# Patient Record
Sex: FEMALE | Race: WHITE | NOT HISPANIC OR LATINO | Employment: UNEMPLOYED | URBAN - METROPOLITAN AREA
[De-identification: names, ages, dates, MRNs, and addresses within clinical notes are randomized per-mention and may not be internally consistent; named-entity substitution may affect disease eponyms.]

---

## 2017-03-15 ENCOUNTER — HISTORICAL (OUTPATIENT)
Dept: RADIOLOGY | Facility: HOSPITAL | Age: 42
End: 2017-03-15

## 2017-06-27 ENCOUNTER — HISTORICAL (OUTPATIENT)
Dept: RADIOLOGY | Facility: HOSPITAL | Age: 42
End: 2017-06-27

## 2017-06-27 LAB
ABS NEUT (OLG): 5.6 X10(3)/MCL (ref 1.5–6.9)
ALBUMIN SERPL-MCNC: 3.4 GM/DL (ref 3.4–5)
ALBUMIN/GLOB SERPL: 0.9 RATIO
ALP SERPL-CCNC: 81 UNIT/L (ref 30–113)
ALT SERPL-CCNC: 22 UNIT/L (ref 10–45)
APPEARANCE, UA: CLEAR
AST SERPL-CCNC: 13 UNIT/L (ref 15–37)
BACTERIA SPEC CULT: ABNORMAL /HPF
BASOPHILS # BLD AUTO: 0 X10(3)/MCL (ref 0–0.1)
BASOPHILS NFR BLD AUTO: 0 % (ref 0–1)
BILIRUB SERPL-MCNC: 0.7 MG/DL (ref 0.1–0.9)
BILIRUB UR QL STRIP: NEGATIVE
BILIRUBIN DIRECT+TOT PNL SERPL-MCNC: 0.2 MG/DL (ref 0–0.3)
BILIRUBIN DIRECT+TOT PNL SERPL-MCNC: 0.5 MG/DL
BUN SERPL-MCNC: 10 MG/DL (ref 10–20)
CALCIUM SERPL-MCNC: 9.2 MG/DL (ref 8–10.5)
CHLORIDE SERPL-SCNC: 106 MMOL/L (ref 100–108)
CHOLEST SERPL-MCNC: 171 MG/DL (ref 140–200)
CHOLEST/HDLC SERPL: 3 MG/DL (ref 35–59)
CO2 SERPL-SCNC: 27 MMOL/L (ref 21–35)
COLOR UR: ABNORMAL
CREAT SERPL-MCNC: 0.87 MG/DL (ref 0.7–1.3)
DEPRECATED CALCIDIOL+CALCIFEROL SERPL-MC: 23.31 NG/ML (ref 30–80)
EOSINOPHIL # BLD AUTO: 0.1 X10(3)/MCL (ref 0–0.6)
EOSINOPHIL NFR BLD AUTO: 2 % (ref 0–5)
ERYTHROCYTE [DISTWIDTH] IN BLOOD BY AUTOMATED COUNT: 14.5 % (ref 11.5–17)
GLOBULIN SER-MCNC: 3.9 GM/DL
GLUCOSE (UA): NEGATIVE
GLUCOSE SERPL-MCNC: 97 MG/DL (ref 75–116)
HCT VFR BLD AUTO: 38.4 % (ref 36–48)
HDLC SERPL-MCNC: 52 MG/DL (ref 35–59)
HGB BLD-MCNC: 12.2 GM/DL (ref 12–16)
HGB UR QL STRIP: ABNORMAL
KETONES UR QL STRIP: NEGATIVE
LDLC SERPL CALC-MCNC: 113 MG/DL (ref 100–129)
LEUKOCYTE ESTERASE UR QL STRIP: NEGATIVE
LYMPHOCYTES # BLD AUTO: 1.7 X10(3)/MCL (ref 0.5–4.1)
LYMPHOCYTES NFR BLD AUTO: 21.1 % (ref 15–40)
MCH RBC QN AUTO: 26 PG (ref 27–34)
MCHC RBC AUTO-ENTMCNC: 32 GM/DL (ref 31–36)
MCV RBC AUTO: 83 FL (ref 80–99)
MONOCYTES # BLD AUTO: 0.6 X10(3)/MCL (ref 0–1.1)
MONOCYTES NFR BLD AUTO: 8 % (ref 4–12)
NEUTROPHILS # BLD AUTO: 5.6 X10(3)/MCL (ref 1.5–6.9)
NEUTROPHILS NFR BLD AUTO: 69 % (ref 43–75)
NITRITE UR QL STRIP: NEGATIVE
PH UR STRIP: 6 [PH]
PLATELET # BLD AUTO: 273 X10(3)/MCL (ref 140–400)
PMV BLD AUTO: 10.9 FL (ref 6.8–10)
POTASSIUM SERPL-SCNC: 3.9 MMOL/L (ref 3.6–5.2)
PROT SERPL-MCNC: 7.3 GM/DL (ref 6.4–8.2)
PROT UR QL STRIP: NEGATIVE
RBC # BLD AUTO: 4.64 X10(6)/MCL (ref 4.2–5.4)
RBC #/AREA URNS HPF: ABNORMAL /HPF
SODIUM SERPL-SCNC: 141 MMOL/L (ref 135–145)
SP GR UR STRIP: 1.01
SQUAMOUS EPITHELIAL, UA: ABNORMAL /LPF
TRIGL SERPL-MCNC: 73 MG/DL (ref 35–150)
TSH SERPL-ACNC: 1.37 MIU/ML (ref 0.36–3.74)
UROBILINOGEN UR STRIP-ACNC: 0.2 EU/DL
VLDLC SERPL CALC-MCNC: 15 MG/DL
WBC # SPEC AUTO: 8.1 X10(3)/MCL (ref 4.5–11.5)
WBC #/AREA URNS HPF: ABNORMAL /[HPF]

## 2018-04-05 ENCOUNTER — HISTORICAL (OUTPATIENT)
Dept: RADIOLOGY | Facility: HOSPITAL | Age: 43
End: 2018-04-05

## 2018-07-03 ENCOUNTER — HISTORICAL (OUTPATIENT)
Dept: LAB | Facility: HOSPITAL | Age: 43
End: 2018-07-03

## 2018-07-05 LAB — FINAL CULTURE: NORMAL

## 2019-05-24 ENCOUNTER — HISTORICAL (OUTPATIENT)
Dept: RADIOLOGY | Facility: HOSPITAL | Age: 44
End: 2019-05-24

## 2020-08-10 ENCOUNTER — HISTORICAL (OUTPATIENT)
Dept: RADIOLOGY | Facility: HOSPITAL | Age: 45
End: 2020-08-10

## 2021-09-13 ENCOUNTER — HISTORICAL (OUTPATIENT)
Dept: RADIOLOGY | Facility: HOSPITAL | Age: 46
End: 2021-09-13

## 2022-09-19 ENCOUNTER — HOSPITAL ENCOUNTER (OUTPATIENT)
Dept: RADIOLOGY | Facility: HOSPITAL | Age: 47
Discharge: HOME | End: 2022-09-19
Attending: OBSTETRICS & GYNECOLOGY
Payer: COMMERCIAL

## 2022-09-19 DIAGNOSIS — Z12.31 ENCOUNTER FOR SCREENING MAMMOGRAM FOR BREAST CANCER: ICD-10-CM

## 2022-09-19 PROCEDURE — 77067 SCR MAMMO BI INCL CAD: CPT | Mod: 26,,, | Performed by: STUDENT IN AN ORGANIZED HEALTH CARE EDUCATION/TRAINING PROGRAM

## 2022-09-19 PROCEDURE — 77063 BREAST TOMOSYNTHESIS BI: CPT | Mod: 26,,, | Performed by: STUDENT IN AN ORGANIZED HEALTH CARE EDUCATION/TRAINING PROGRAM

## 2022-09-19 PROCEDURE — 77063 MAMMO DIGITAL SCREENING BILAT WITH TOMO: ICD-10-PCS | Mod: 26,,, | Performed by: STUDENT IN AN ORGANIZED HEALTH CARE EDUCATION/TRAINING PROGRAM

## 2022-09-19 PROCEDURE — 77067 MAMMO DIGITAL SCREENING BILAT WITH TOMO: ICD-10-PCS | Mod: 26,,, | Performed by: STUDENT IN AN ORGANIZED HEALTH CARE EDUCATION/TRAINING PROGRAM

## 2022-09-19 PROCEDURE — 77067 SCR MAMMO BI INCL CAD: CPT | Mod: TC

## 2023-09-15 ENCOUNTER — ANESTHESIA EVENT (OUTPATIENT)
Dept: SURGERY | Facility: HOSPITAL | Age: 48
DRG: 493 | End: 2023-09-15
Payer: MEDICAID

## 2023-09-15 ENCOUNTER — ANESTHESIA (OUTPATIENT)
Dept: SURGERY | Facility: HOSPITAL | Age: 48
DRG: 493 | End: 2023-09-15
Payer: MEDICAID

## 2023-09-15 ENCOUNTER — HOSPITAL ENCOUNTER (INPATIENT)
Facility: HOSPITAL | Age: 48
LOS: 13 days | Discharge: HOME HEALTH | DRG: 493 | End: 2023-09-28
Attending: STUDENT IN AN ORGANIZED HEALTH CARE EDUCATION/TRAINING PROGRAM | Admitting: ORTHOPAEDIC SURGERY
Payer: MEDICAID

## 2023-09-15 DIAGNOSIS — S82.892A: ICD-10-CM

## 2023-09-15 DIAGNOSIS — S82.872A CLOSED DISPLACED PILON FRACTURE OF LEFT TIBIA, INITIAL ENCOUNTER: ICD-10-CM

## 2023-09-15 DIAGNOSIS — S82.871A CLOSED DISPLACED PILON FRACTURE OF RIGHT TIBIA, INITIAL ENCOUNTER: Primary | ICD-10-CM

## 2023-09-15 DIAGNOSIS — M79.603 ARM PAIN: ICD-10-CM

## 2023-09-15 DIAGNOSIS — T14.90XA TRAUMA: ICD-10-CM

## 2023-09-15 DIAGNOSIS — S82.891A: ICD-10-CM

## 2023-09-15 DIAGNOSIS — M25.579 ANKLE PAIN: ICD-10-CM

## 2023-09-15 LAB
ABORH RETYPE: NORMAL
ALBUMIN SERPL-MCNC: 4.3 G/DL (ref 3.5–5)
ALBUMIN/GLOB SERPL: 1.3 RATIO (ref 1.1–2)
ALP SERPL-CCNC: 98 UNIT/L (ref 40–150)
ALT SERPL-CCNC: 16 UNIT/L (ref 0–55)
APTT PPP: 23.4 SECONDS (ref 23.2–33.7)
AST SERPL-CCNC: 16 UNIT/L (ref 5–34)
BASOPHILS # BLD AUTO: 0.04 X10(3)/MCL
BASOPHILS NFR BLD AUTO: 0.3 %
BILIRUB SERPL-MCNC: 0.9 MG/DL
BUN SERPL-MCNC: 11.6 MG/DL (ref 7–18.7)
CALCIUM SERPL-MCNC: 9.7 MG/DL (ref 8.4–10.2)
CHLORIDE SERPL-SCNC: 106 MMOL/L (ref 98–107)
CO2 SERPL-SCNC: 22 MMOL/L (ref 22–29)
CREAT SERPL-MCNC: 0.94 MG/DL (ref 0.55–1.02)
EOSINOPHIL # BLD AUTO: 0.24 X10(3)/MCL (ref 0–0.9)
EOSINOPHIL NFR BLD AUTO: 2 %
ERYTHROCYTE [DISTWIDTH] IN BLOOD BY AUTOMATED COUNT: 13.7 % (ref 11.5–17)
ETHANOL SERPL-MCNC: <10 MG/DL
GFR SERPLBLD CREATININE-BSD FMLA CKD-EPI: >60 MLS/MIN/1.73/M2
GLOBULIN SER-MCNC: 3.4 GM/DL (ref 2.4–3.5)
GLUCOSE SERPL-MCNC: 127 MG/DL (ref 74–100)
GROUP & RH: NORMAL
HCT VFR BLD AUTO: 40.2 % (ref 37–47)
HGB BLD-MCNC: 13.3 G/DL (ref 12–16)
IMM GRANULOCYTES # BLD AUTO: 0.06 X10(3)/MCL (ref 0–0.04)
IMM GRANULOCYTES NFR BLD AUTO: 0.5 %
INDIRECT COOMBS GEL: NORMAL
INR PPP: 1
LACTATE SERPL-SCNC: 0.9 MMOL/L (ref 0.5–2.2)
LACTATE SERPL-SCNC: 2.5 MMOL/L (ref 0.5–2.2)
LYMPHOCYTES # BLD AUTO: 2.13 X10(3)/MCL (ref 0.6–4.6)
LYMPHOCYTES NFR BLD AUTO: 17.7 %
MCH RBC QN AUTO: 28.2 PG (ref 27–31)
MCHC RBC AUTO-ENTMCNC: 33.1 G/DL (ref 33–36)
MCV RBC AUTO: 85.2 FL (ref 80–94)
MONOCYTES # BLD AUTO: 0.77 X10(3)/MCL (ref 0.1–1.3)
MONOCYTES NFR BLD AUTO: 6.4 %
NEUTROPHILS # BLD AUTO: 8.81 X10(3)/MCL (ref 2.1–9.2)
NEUTROPHILS NFR BLD AUTO: 73.1 %
NRBC BLD AUTO-RTO: 0 %
PLATELET # BLD AUTO: 275 X10(3)/MCL (ref 130–400)
PMV BLD AUTO: 10 FL (ref 7.4–10.4)
POTASSIUM SERPL-SCNC: 3.7 MMOL/L (ref 3.5–5.1)
PROT SERPL-MCNC: 7.7 GM/DL (ref 6.4–8.3)
PROTHROMBIN TIME: 12.8 SECONDS (ref 12.5–14.5)
RBC # BLD AUTO: 4.72 X10(6)/MCL (ref 4.2–5.4)
SODIUM SERPL-SCNC: 139 MMOL/L (ref 136–145)
SPECIMEN OUTDATE: NORMAL
WBC # SPEC AUTO: 12.05 X10(3)/MCL (ref 4.5–11.5)

## 2023-09-15 PROCEDURE — G0390 TRAUMA RESPONS W/HOSP CRITI: HCPCS

## 2023-09-15 PROCEDURE — C1713 ANCHOR/SCREW BN/BN,TIS/BN: HCPCS | Performed by: ORTHOPAEDIC SURGERY

## 2023-09-15 PROCEDURE — 71000033 HC RECOVERY, INTIAL HOUR: Performed by: ORTHOPAEDIC SURGERY

## 2023-09-15 PROCEDURE — 25500020 PHARM REV CODE 255: Performed by: STUDENT IN AN ORGANIZED HEALTH CARE EDUCATION/TRAINING PROGRAM

## 2023-09-15 PROCEDURE — 63600175 PHARM REV CODE 636 W HCPCS: Performed by: NURSE ANESTHETIST, CERTIFIED REGISTERED

## 2023-09-15 PROCEDURE — 82077 ASSAY SPEC XCP UR&BREATH IA: CPT | Performed by: STUDENT IN AN ORGANIZED HEALTH CARE EDUCATION/TRAINING PROGRAM

## 2023-09-15 PROCEDURE — 20690 APPL UNIPLN UNI EXT FIXJ SYS: CPT | Mod: 51,LT,, | Performed by: ORTHOPAEDIC SURGERY

## 2023-09-15 PROCEDURE — D9220A PRA ANESTHESIA: ICD-10-PCS | Mod: CRNA,,, | Performed by: NURSE ANESTHETIST, CERTIFIED REGISTERED

## 2023-09-15 PROCEDURE — 96361 HYDRATE IV INFUSION ADD-ON: CPT

## 2023-09-15 PROCEDURE — 63600175 PHARM REV CODE 636 W HCPCS

## 2023-09-15 PROCEDURE — 21400001 HC TELEMETRY ROOM

## 2023-09-15 PROCEDURE — 36000709 HC OR TIME LEV III EA ADD 15 MIN: Performed by: ORTHOPAEDIC SURGERY

## 2023-09-15 PROCEDURE — 99291 CRITICAL CARE FIRST HOUR: CPT

## 2023-09-15 PROCEDURE — 37000009 HC ANESTHESIA EA ADD 15 MINS: Performed by: ORTHOPAEDIC SURGERY

## 2023-09-15 PROCEDURE — 85730 THROMBOPLASTIN TIME PARTIAL: CPT | Performed by: STUDENT IN AN ORGANIZED HEALTH CARE EDUCATION/TRAINING PROGRAM

## 2023-09-15 PROCEDURE — 36000708 HC OR TIME LEV III 1ST 15 MIN: Performed by: ORTHOPAEDIC SURGERY

## 2023-09-15 PROCEDURE — 25000003 PHARM REV CODE 250

## 2023-09-15 PROCEDURE — 27825 PR CLOSED RX WEIGHT BEAR DIST TIB,MANIP: ICD-10-PCS | Mod: 51,50,, | Performed by: ORTHOPAEDIC SURGERY

## 2023-09-15 PROCEDURE — 85025 COMPLETE CBC W/AUTO DIFF WBC: CPT | Performed by: STUDENT IN AN ORGANIZED HEALTH CARE EDUCATION/TRAINING PROGRAM

## 2023-09-15 PROCEDURE — 20690 PR APPLY BONE UNIPLANE,EXT FIX DEV: ICD-10-PCS | Mod: RT,,, | Performed by: ORTHOPAEDIC SURGERY

## 2023-09-15 PROCEDURE — 25000003 PHARM REV CODE 250: Performed by: NURSE ANESTHETIST, CERTIFIED REGISTERED

## 2023-09-15 PROCEDURE — 37000008 HC ANESTHESIA 1ST 15 MINUTES: Performed by: ORTHOPAEDIC SURGERY

## 2023-09-15 PROCEDURE — D9220A PRA ANESTHESIA: Mod: ANES,,, | Performed by: ANESTHESIOLOGY

## 2023-09-15 PROCEDURE — D9220A PRA ANESTHESIA: ICD-10-PCS | Mod: ANES,,, | Performed by: ANESTHESIOLOGY

## 2023-09-15 PROCEDURE — D9220A PRA ANESTHESIA: Mod: CRNA,,, | Performed by: NURSE ANESTHETIST, CERTIFIED REGISTERED

## 2023-09-15 PROCEDURE — 80053 COMPREHEN METABOLIC PANEL: CPT | Performed by: STUDENT IN AN ORGANIZED HEALTH CARE EDUCATION/TRAINING PROGRAM

## 2023-09-15 PROCEDURE — 96374 THER/PROPH/DIAG INJ IV PUSH: CPT

## 2023-09-15 PROCEDURE — 85610 PROTHROMBIN TIME: CPT | Performed by: STUDENT IN AN ORGANIZED HEALTH CARE EDUCATION/TRAINING PROGRAM

## 2023-09-15 PROCEDURE — 90715 TDAP VACCINE 7 YRS/> IM: CPT | Performed by: STUDENT IN AN ORGANIZED HEALTH CARE EDUCATION/TRAINING PROGRAM

## 2023-09-15 PROCEDURE — 63600175 PHARM REV CODE 636 W HCPCS: Performed by: STUDENT IN AN ORGANIZED HEALTH CARE EDUCATION/TRAINING PROGRAM

## 2023-09-15 PROCEDURE — 86900 BLOOD TYPING SEROLOGIC ABO: CPT | Performed by: STUDENT IN AN ORGANIZED HEALTH CARE EDUCATION/TRAINING PROGRAM

## 2023-09-15 PROCEDURE — 29515 APPLICATION SHORT LEG SPLINT: CPT

## 2023-09-15 PROCEDURE — 90471 IMMUNIZATION ADMIN: CPT | Performed by: STUDENT IN AN ORGANIZED HEALTH CARE EDUCATION/TRAINING PROGRAM

## 2023-09-15 PROCEDURE — 63600175 PHARM REV CODE 636 W HCPCS: Performed by: ANESTHESIOLOGY

## 2023-09-15 PROCEDURE — 11000001 HC ACUTE MED/SURG PRIVATE ROOM

## 2023-09-15 PROCEDURE — 27825 TREAT LOWER LEG FRACTURE: CPT | Mod: 51,50,, | Performed by: ORTHOPAEDIC SURGERY

## 2023-09-15 PROCEDURE — 83605 ASSAY OF LACTIC ACID: CPT | Performed by: STUDENT IN AN ORGANIZED HEALTH CARE EDUCATION/TRAINING PROGRAM

## 2023-09-15 RX ORDER — MIDAZOLAM HYDROCHLORIDE 1 MG/ML
INJECTION INTRAMUSCULAR; INTRAVENOUS
Status: DISCONTINUED | OUTPATIENT
Start: 2023-09-15 | End: 2023-09-15

## 2023-09-15 RX ORDER — ONDANSETRON 2 MG/ML
INJECTION INTRAMUSCULAR; INTRAVENOUS
Status: DISCONTINUED | OUTPATIENT
Start: 2023-09-15 | End: 2023-09-15

## 2023-09-15 RX ORDER — METHOCARBAMOL 100 MG/ML
750 INJECTION, SOLUTION INTRAMUSCULAR; INTRAVENOUS ONCE
Status: COMPLETED | OUTPATIENT
Start: 2023-09-15 | End: 2023-09-15

## 2023-09-15 RX ORDER — LACTULOSE 10 G/15ML
20 SOLUTION ORAL EVERY 6 HOURS PRN
Status: DISCONTINUED | OUTPATIENT
Start: 2023-09-15 | End: 2023-09-28 | Stop reason: HOSPADM

## 2023-09-15 RX ORDER — ACETAMINOPHEN 10 MG/ML
1000 INJECTION, SOLUTION INTRAVENOUS ONCE
Status: COMPLETED | OUTPATIENT
Start: 2023-09-16 | End: 2023-09-16

## 2023-09-15 RX ORDER — HYDROMORPHONE HYDROCHLORIDE 2 MG/ML
INJECTION, SOLUTION INTRAMUSCULAR; INTRAVENOUS; SUBCUTANEOUS
Status: COMPLETED
Start: 2023-09-15 | End: 2023-09-15

## 2023-09-15 RX ORDER — METHOCARBAMOL 750 MG/1
750 TABLET, FILM COATED ORAL EVERY 8 HOURS PRN
Status: DISCONTINUED | OUTPATIENT
Start: 2023-09-15 | End: 2023-09-17

## 2023-09-15 RX ORDER — HYDROMORPHONE HYDROCHLORIDE 2 MG/ML
0.4 INJECTION, SOLUTION INTRAMUSCULAR; INTRAVENOUS; SUBCUTANEOUS EVERY 5 MIN PRN
Status: DISCONTINUED | OUTPATIENT
Start: 2023-09-15 | End: 2023-09-15 | Stop reason: HOSPADM

## 2023-09-15 RX ORDER — PROPOFOL 10 MG/ML
VIAL (ML) INTRAVENOUS
Status: DISCONTINUED | OUTPATIENT
Start: 2023-09-15 | End: 2023-09-15

## 2023-09-15 RX ORDER — GABAPENTIN 300 MG/1
300 CAPSULE ORAL NIGHTLY
Status: DISCONTINUED | OUTPATIENT
Start: 2023-09-15 | End: 2023-09-24

## 2023-09-15 RX ORDER — FENTANYL CITRATE 50 UG/ML
INJECTION, SOLUTION INTRAMUSCULAR; INTRAVENOUS
Status: DISCONTINUED | OUTPATIENT
Start: 2023-09-15 | End: 2023-09-15

## 2023-09-15 RX ORDER — HYDROMORPHONE HYDROCHLORIDE 2 MG/ML
1 INJECTION, SOLUTION INTRAMUSCULAR; INTRAVENOUS; SUBCUTANEOUS EVERY 4 HOURS PRN
Status: DISCONTINUED | OUTPATIENT
Start: 2023-09-15 | End: 2023-09-15 | Stop reason: SDUPTHER

## 2023-09-15 RX ORDER — AMOXICILLIN 250 MG
2 CAPSULE ORAL 2 TIMES DAILY
Status: DISCONTINUED | OUTPATIENT
Start: 2023-09-15 | End: 2023-09-28 | Stop reason: HOSPADM

## 2023-09-15 RX ORDER — TALC
6 POWDER (GRAM) TOPICAL NIGHTLY PRN
Status: DISCONTINUED | OUTPATIENT
Start: 2023-09-15 | End: 2023-09-28 | Stop reason: HOSPADM

## 2023-09-15 RX ORDER — NAPROXEN SODIUM 220 MG/1
81 TABLET, FILM COATED ORAL 2 TIMES DAILY
Status: DISCONTINUED | OUTPATIENT
Start: 2023-09-16 | End: 2023-09-17

## 2023-09-15 RX ORDER — BISACODYL 10 MG
10 SUPPOSITORY, RECTAL RECTAL DAILY
Status: COMPLETED | OUTPATIENT
Start: 2023-09-18 | End: 2023-09-18

## 2023-09-15 RX ORDER — TALC
6 POWDER (GRAM) TOPICAL NIGHTLY PRN
Status: DISCONTINUED | OUTPATIENT
Start: 2023-09-15 | End: 2023-09-15 | Stop reason: SDUPTHER

## 2023-09-15 RX ORDER — METHOCARBAMOL 100 MG/ML
INJECTION, SOLUTION INTRAMUSCULAR; INTRAVENOUS
Status: COMPLETED
Start: 2023-09-15 | End: 2023-09-15

## 2023-09-15 RX ORDER — MORPHINE SULFATE 10 MG/ML
4 INJECTION INTRAMUSCULAR; INTRAVENOUS; SUBCUTANEOUS
Status: ACTIVE | OUTPATIENT
Start: 2023-09-15 | End: 2023-09-16

## 2023-09-15 RX ORDER — OXYCODONE AND ACETAMINOPHEN 5; 325 MG/1; MG/1
1 TABLET ORAL EVERY 4 HOURS PRN
Status: DISCONTINUED | OUTPATIENT
Start: 2023-09-15 | End: 2023-09-19

## 2023-09-15 RX ORDER — SODIUM CHLORIDE 0.9 % (FLUSH) 0.9 %
10 SYRINGE (ML) INJECTION
Status: DISCONTINUED | OUTPATIENT
Start: 2023-09-15 | End: 2023-09-28 | Stop reason: HOSPADM

## 2023-09-15 RX ORDER — CEFAZOLIN SODIUM 2 G/50ML
2 SOLUTION INTRAVENOUS ONCE
Status: COMPLETED | OUTPATIENT
Start: 2023-09-15 | End: 2023-09-15

## 2023-09-15 RX ORDER — ONDANSETRON 2 MG/ML
4 INJECTION INTRAMUSCULAR; INTRAVENOUS DAILY PRN
Status: DISCONTINUED | OUTPATIENT
Start: 2023-09-15 | End: 2023-09-15 | Stop reason: HOSPADM

## 2023-09-15 RX ORDER — SODIUM CHLORIDE 0.9 % (FLUSH) 0.9 %
10 SYRINGE (ML) INJECTION
Status: DISCONTINUED | OUTPATIENT
Start: 2023-09-15 | End: 2023-09-15 | Stop reason: HOSPADM

## 2023-09-15 RX ORDER — HYDROMORPHONE HYDROCHLORIDE 2 MG/ML
1 INJECTION, SOLUTION INTRAMUSCULAR; INTRAVENOUS; SUBCUTANEOUS
Status: COMPLETED | OUTPATIENT
Start: 2023-09-15 | End: 2023-09-15

## 2023-09-15 RX ORDER — POLYETHYLENE GLYCOL 3350 17 G/17G
17 POWDER, FOR SOLUTION ORAL NIGHTLY
Status: DISCONTINUED | OUTPATIENT
Start: 2023-09-15 | End: 2023-09-28 | Stop reason: HOSPADM

## 2023-09-15 RX ORDER — OXYCODONE AND ACETAMINOPHEN 7.5; 325 MG/1; MG/1
1 TABLET ORAL EVERY 4 HOURS PRN
Status: DISCONTINUED | OUTPATIENT
Start: 2023-09-15 | End: 2023-09-16

## 2023-09-15 RX ORDER — HYDROMORPHONE HYDROCHLORIDE 2 MG/ML
0.5 INJECTION, SOLUTION INTRAMUSCULAR; INTRAVENOUS; SUBCUTANEOUS EVERY 4 HOURS PRN
Status: DISCONTINUED | OUTPATIENT
Start: 2023-09-15 | End: 2023-09-16

## 2023-09-15 RX ORDER — ACETAMINOPHEN 325 MG/1
650 TABLET ORAL EVERY 8 HOURS PRN
Status: DISCONTINUED | OUTPATIENT
Start: 2023-09-15 | End: 2023-09-28 | Stop reason: HOSPADM

## 2023-09-15 RX ORDER — DEXAMETHASONE SODIUM PHOSPHATE 4 MG/ML
INJECTION, SOLUTION INTRA-ARTICULAR; INTRALESIONAL; INTRAMUSCULAR; INTRAVENOUS; SOFT TISSUE
Status: DISCONTINUED | OUTPATIENT
Start: 2023-09-15 | End: 2023-09-15

## 2023-09-15 RX ORDER — GLYCOPYRROLATE 0.2 MG/ML
INJECTION INTRAMUSCULAR; INTRAVENOUS
Status: DISCONTINUED | OUTPATIENT
Start: 2023-09-15 | End: 2023-09-15

## 2023-09-15 RX ORDER — FENTANYL CITRATE 50 UG/ML
50 INJECTION, SOLUTION INTRAMUSCULAR; INTRAVENOUS
Status: COMPLETED | OUTPATIENT
Start: 2023-09-15 | End: 2023-09-15

## 2023-09-15 RX ORDER — ONDANSETRON 2 MG/ML
4 INJECTION INTRAMUSCULAR; INTRAVENOUS
Status: COMPLETED | OUTPATIENT
Start: 2023-09-15 | End: 2023-09-15

## 2023-09-15 RX ORDER — MAG HYDROX/ALUMINUM HYD/SIMETH 200-200-20
30 SUSPENSION, ORAL (FINAL DOSE FORM) ORAL EVERY 6 HOURS PRN
Status: DISCONTINUED | OUTPATIENT
Start: 2023-09-15 | End: 2023-09-28 | Stop reason: HOSPADM

## 2023-09-15 RX ORDER — ONDANSETRON 2 MG/ML
4 INJECTION INTRAMUSCULAR; INTRAVENOUS EVERY 8 HOURS PRN
Status: DISCONTINUED | OUTPATIENT
Start: 2023-09-15 | End: 2023-09-28 | Stop reason: HOSPADM

## 2023-09-15 RX ORDER — ONDANSETRON 2 MG/ML
4 INJECTION INTRAMUSCULAR; INTRAVENOUS EVERY 6 HOURS PRN
Status: DISCONTINUED | OUTPATIENT
Start: 2023-09-15 | End: 2023-09-28 | Stop reason: HOSPADM

## 2023-09-15 RX ORDER — FAMOTIDINE 10 MG/ML
20 INJECTION INTRAVENOUS EVERY 12 HOURS
Status: DISCONTINUED | OUTPATIENT
Start: 2023-09-15 | End: 2023-09-15 | Stop reason: SDUPTHER

## 2023-09-15 RX ORDER — FAMOTIDINE 20 MG/1
20 TABLET, FILM COATED ORAL 2 TIMES DAILY
Status: DISCONTINUED | OUTPATIENT
Start: 2023-09-15 | End: 2023-09-28 | Stop reason: HOSPADM

## 2023-09-15 RX ORDER — ROCURONIUM BROMIDE 10 MG/ML
INJECTION, SOLUTION INTRAVENOUS
Status: DISCONTINUED | OUTPATIENT
Start: 2023-09-15 | End: 2023-09-15

## 2023-09-15 RX ORDER — DOCUSATE SODIUM 100 MG/1
200 CAPSULE, LIQUID FILLED ORAL DAILY
Status: DISCONTINUED | OUTPATIENT
Start: 2023-09-16 | End: 2023-09-28 | Stop reason: HOSPADM

## 2023-09-15 RX ORDER — SODIUM CHLORIDE 9 MG/ML
INJECTION, SOLUTION INTRAVENOUS CONTINUOUS
Status: DISCONTINUED | OUTPATIENT
Start: 2023-09-15 | End: 2023-09-28 | Stop reason: HOSPADM

## 2023-09-15 RX ORDER — CEFAZOLIN SODIUM 2 G/50ML
2 SOLUTION INTRAVENOUS
Status: COMPLETED | OUTPATIENT
Start: 2023-09-16 | End: 2023-09-16

## 2023-09-15 RX ADMIN — SODIUM CHLORIDE, SODIUM GLUCONATE, SODIUM ACETATE, POTASSIUM CHLORIDE AND MAGNESIUM CHLORIDE: 526; 502; 368; 37; 30 INJECTION, SOLUTION INTRAVENOUS at 06:09

## 2023-09-15 RX ADMIN — SODIUM CHLORIDE: 9 INJECTION, SOLUTION INTRAVENOUS at 08:09

## 2023-09-15 RX ADMIN — ROCURONIUM BROMIDE 50 MG: 10 SOLUTION INTRAVENOUS at 06:09

## 2023-09-15 RX ADMIN — HYDROMORPHONE HYDROCHLORIDE 1 MG: 2 INJECTION, SOLUTION INTRAMUSCULAR; INTRAVENOUS; SUBCUTANEOUS at 03:09

## 2023-09-15 RX ADMIN — CEFAZOLIN SODIUM 2 G: 2 SOLUTION INTRAVENOUS at 06:09

## 2023-09-15 RX ADMIN — HYDROMORPHONE HYDROCHLORIDE 0.4 MG: 2 INJECTION, SOLUTION INTRAMUSCULAR; INTRAVENOUS; SUBCUTANEOUS at 07:09

## 2023-09-15 RX ADMIN — SUGAMMADEX 200 MG: 100 INJECTION, SOLUTION INTRAVENOUS at 07:09

## 2023-09-15 RX ADMIN — OXYCODONE HYDROCHLORIDE AND ACETAMINOPHEN 1 TABLET: 5; 325 TABLET ORAL at 10:09

## 2023-09-15 RX ADMIN — METHOCARBAMOL 750 MG: 100 INJECTION, SOLUTION INTRAMUSCULAR; INTRAVENOUS at 07:09

## 2023-09-15 RX ADMIN — MIDAZOLAM HYDROCHLORIDE 2 MG: 1 INJECTION, SOLUTION INTRAMUSCULAR; INTRAVENOUS at 06:09

## 2023-09-15 RX ADMIN — POLYETHYLENE GLYCOL 3350 17 G: 17 POWDER, FOR SOLUTION ORAL at 09:09

## 2023-09-15 RX ADMIN — SENNOSIDES AND DOCUSATE SODIUM 2 TABLET: 50; 8.6 TABLET ORAL at 09:09

## 2023-09-15 RX ADMIN — GABAPENTIN 300 MG: 300 CAPSULE ORAL at 09:09

## 2023-09-15 RX ADMIN — ONDANSETRON 8 MG: 2 INJECTION INTRAMUSCULAR; INTRAVENOUS at 07:09

## 2023-09-15 RX ADMIN — FENTANYL CITRATE 50 MCG: 50 INJECTION, SOLUTION INTRAMUSCULAR; INTRAVENOUS at 03:09

## 2023-09-15 RX ADMIN — GLYCOPYRROLATE 0.2 MG: 0.2 INJECTION INTRAMUSCULAR; INTRAVENOUS at 06:09

## 2023-09-15 RX ADMIN — SODIUM CHLORIDE, POTASSIUM CHLORIDE, SODIUM LACTATE AND CALCIUM CHLORIDE 1000 ML: 600; 310; 30; 20 INJECTION, SOLUTION INTRAVENOUS at 03:09

## 2023-09-15 RX ADMIN — IOPAMIDOL 100 ML: 755 INJECTION, SOLUTION INTRAVENOUS at 03:09

## 2023-09-15 RX ADMIN — DEXAMETHASONE SODIUM PHOSPHATE 4 MG: 4 INJECTION, SOLUTION INTRA-ARTICULAR; INTRALESIONAL; INTRAMUSCULAR; INTRAVENOUS; SOFT TISSUE at 06:09

## 2023-09-15 RX ADMIN — TETANUS TOXOID, REDUCED DIPHTHERIA TOXOID AND ACELLULAR PERTUSSIS VACCINE, ADSORBED 0.5 ML: 5; 2.5; 8; 8; 2.5 SUSPENSION INTRAMUSCULAR at 03:09

## 2023-09-15 RX ADMIN — ONDANSETRON 4 MG: 2 INJECTION INTRAMUSCULAR; INTRAVENOUS at 03:09

## 2023-09-15 RX ADMIN — PROPOFOL 150 MG: 10 INJECTION, EMULSION INTRAVENOUS at 06:09

## 2023-09-15 RX ADMIN — FENTANYL CITRATE 50 MCG: 50 INJECTION, SOLUTION INTRAMUSCULAR; INTRAVENOUS at 06:09

## 2023-09-15 RX ADMIN — METHOCARBAMOL 750 MG: 750 TABLET ORAL at 10:09

## 2023-09-15 RX ADMIN — FAMOTIDINE 20 MG: 20 TABLET, FILM COATED ORAL at 09:09

## 2023-09-15 NOTE — CONSULTS
Mom says near her diaper area is very red and warm. The cream she is using isn't working at all. Would like to know what to do for this. .   [facility] - Emergency Dept  Orthopedic Consultation Note:    Patient Name: Janeen Arrieta  MRN: [id number]  Admission Date: 9/15/2023  Hospital Length of Stay: 0 days  Attending Provider: Felix Charles MD  Primary Care Provider: Andrea Norman MD    Inpatient consult to Orthopedic Surgery  Consult performed by: Maris Archibald FNP  Consult ordered by: Alexis Vogel MD        Subjective:     Chief Complaint:   Chief Complaint   Patient presents with    Trauma     Fell from the ladder approximately 10-12ft, denies LOC. Noted with significant swelling to bilateral ankle and deformity to tib, left        History of Present Illness:     This is a 47-year-old female who presented to the [facility] ED with complaints of bilateral ankle pain status post fall from a 10-12 foot ladder.  She states that when she fell the impact was to her bilateral lower extremities.  Endorses bilateral ankle pain with intermittent numbness and tingling.  Radiographs of the right ankle performed in the ED demonstrated an oblique fracture through the right distal tibia with anterior displacement and lateral angulation and mildly displaced fracture of the medial malleolus.  Radiographs of the left ankle performed in the ED demonstrated comminuted fracture of the tibial plafond.  She also complained of right forearm pain with an associated laceration.  Radiographs performed in the ED did not appreciate any acute osseous pathology.  Posterior Orthoglass splints applied to the bilateral ankles.  Orthopedic surgery consulted for further evaluation recommendations.    No past medical history on file.    No past surgical history on file.    Review of patient's allergies indicates:  No Known Allergies    Current Facility-Administered Medications   Medication    acetaminophen tablet 650 mg    cefazolin (ANCEF) 2 gram in dextrose 5% 50 mL IVPB (premix)    famotidine (PF) injection 20 mg    HYDROmorphone (PF) injection 0.5 mg     "HYDROmorphone (PF) injection 1 mg    melatonin tablet 6 mg    ondansetron injection 4 mg    sodium chloride 0.9% flush 10 mL     No current outpatient medications on file.     Family History    None       Tobacco Use    Smoking status: Not on file    Smokeless tobacco: Not on file   Substance and Sexual Activity    Alcohol use: Not on file    Drug use: Not on file    Sexual activity: Not on file       Review of Systems:  Constitutional: Denies fever chills  CV: No chest pain  Resp: No labored breathing  MSK: Pain evident at site of injury located in HPI   Integ: No signs of abrasions or lacerations  Neuro: No numbness or tingling  Lymphatic: No swelling outside the area of injury     Objective:     Vital Signs (Most Recent):  Temp: 97.8 °F (36.6 °C) (09/15/23 1546)  Pulse: 80 (09/15/23 1721)  Resp: 20 (09/15/23 1721)  BP: 120/69 (09/15/23 1721)  SpO2: 97 % (09/15/23 1721) Vital Signs (24h Range):  Temp:  [97.8 °F (36.6 °C)-99.5 °F (37.5 °C)] 97.8 °F (36.6 °C)  Pulse:  [76-85] 80  Resp:  [19-20] 20  SpO2:  [97 %-100 %] 97 %  BP: (120-145)/() 120/69     Weight: 93.4 kg (206 lb)  Height: 5' 4" (162.6 cm)  Body mass index is 35.36 kg/m².    No intake or output data in the 24 hours ending 09/15/23 1739    Physical Examination:  General: Alert and oriented x3 no acute distress nontoxic-appearing appropriate affect.  Constitutional: Vital signs are examined and stable.  Resp: No signs of labored breathing  Musculoskeletal:  RLE exam:  Orthoglass splint in place.  Compartments soft and compressible.  Range of motion and provocative exams limited secondary to pain, able to move digits.  NVI distally with 2+ DP pulse and CR <3 seconds.  Sensation intact to light touch, endorses intermittent numbness and tingling.  LLE exam:  Orthoglass splint in place.  Compartments soft and compressible.  Range of motion and provocative exams limited secondary to pain, able to move digits.  NVI distally with 2+ DP pulse and CR <3 " seconds.  Sensation intact to light touch, endorses intermittent numbness and tingling.      Significant Labs: All pertinent labs within the past 24 hours have been reviewed.  Recent Lab Results         09/15/23  1704   09/15/23  1514   09/15/23  1503        Albumin/Globulin Ratio   1.3         ABO and RH     A POS       Albumin   4.3         Alcohol, Serum   <10.0         Alkaline Phosphatase   98         ALT   16         aPTT   23.4  Comment: For Minimal Heparin Infusion, the goal aPTT 64-85 seconds corresponds to an anti-Xa of 0.3-0.5.    For Low Intensity and High Intensity Heparin, the goal aPTT  seconds corresponds to an anti-Xa of 0.3-0.7         AST   16         Baso #   0.04         Basophil %   0.3         BILIRUBIN TOTAL   0.9         BUN   11.6         Calcium   9.7         Chloride   106         CO2   22         Creatinine   0.94         eGFR   >60         Eos #   0.24         Eosinophil %   2.0         Globulin, Total   3.4         Glucose   127         Group & Rh   A POS         Hematocrit   40.2         Hemoglobin   13.3         Immature Grans (Abs)   0.06         Immature Granulocytes   0.5         Indirect Melva GEL   NEG         INR   1.0         Lactate, Frankie 0.9   2.5         Lymph #   2.13         LYMPH %   17.7         MCH   28.2         MCHC   33.1         MCV   85.2         Mono #   0.77         Mono %   6.4         MPV   10.0         Neut #   8.81         Neut %   73.1         nRBC   0.0         Platelets   275         Potassium   3.7         PROTEIN TOTAL   7.7         Protime   12.8         RBC   4.72         RDW   13.7         Sodium   139         Specimen Outdate   09/18/2023 23:59         WBC   12.05                  Significant Imaging: I have reviewed all pertinent imaging results/findings.  X-Ray Tibia Fibula 2 View Right    Result Date: 9/15/2023  EXAMINATION: XR TIBIA FIBULA 2 VIEW RIGHT CLINICAL HISTORY: Injury, unspecified, initial encounter TECHNIQUE: AP and lateral views  of the right tibia and fibula were performed. COMPARISON: None. FINDINGS: There is a comminuted fracture of the distal tibia.  It is displaced.  No dislocation is seen.  The fibula is intact.  There is diffuse soft tissue swelling in the ankle.     Comminuted fracture distal tibia Electronically signed by: Jarett Montesinos Date:    09/15/2023 Time:    17:15    X-Ray Tibia Fibula 2 View Left    Result Date: 9/15/2023  EXAMINATION: XR TIBIA FIBULA 2 VIEW LEFT CLINICAL HISTORY: Injury, unspecified, initial encounter TECHNIQUE: AP and lateral views of the left tibia and fibula were performed. COMPARISON: None. FINDINGS: There is a comminuted fracture of the distal tibia.  There is displacement seen.  There is diffuse soft tissue swelling in the ankle.     Comminuted fracture of the distal tibia with displacement Electronically signed by: Jarett Montesinos Date:    09/15/2023 Time:    17:10    X-Ray Forearm Right    Result Date: 9/15/2023  EXAMINATION: XR FOREARM RIGHT CLINICAL HISTORY: Pain in arm, unspecified TECHNIQUE: AP and lateral views of the right forearm COMPARISON: None FINDINGS: No acute fracture identified.  Joint alignments are maintained.  Small olecranon enthesophyte.  Few small areas of retained debris along the skin surface of the upper forearm.     No acute osseous process appreciated. Electronically signed by: Claudy Mcgill Date:    09/15/2023 Time:    16:22    CT Ankle (Including Hindfoot) Without Contrast Bilateral    Result Date: 9/15/2023  EXAMINATION: CT ANKLE (INCLUDING HINDFOOT) WITHOUT CONTRAST BILATERAL CLINICAL HISTORY: Fracture, ankle; TECHNIQUE: Noncontrast CT imaging of both ankles.  Axial, coronal and sagittal reformatted images reviewed.   Dose length product is 105 mGycm. Automatic exposure control, adjustment of mA/kV or iterative reconstruction technique used to limit radiation dose. COMPARISON: Radiographs earlier today FINDINGS: On the right, there is a comminuted fracture of the  distal tibia with displacement of to 7 mm.  Largest fracture fragment is laterally angulated.  Minimally displaced transverse fracture through the medial malleolus.  Few tiny chip fractures near the tip of the distal fibula. On the left, there is also a comminuted tibial fracture with extension into the medial malleolus.  On this side, fragments are displaced by up to about 2 cm.  Several small osseous fragments lie between the distal fibula and talus.     Comminuted bilateral tibial fractures.  Several tiny chip fractures arising from the distal right fibula.  Several additional tiny fracture fragments positioned between the distal left fibula and talus are of unclear origin. Electronically signed by: Claudy Mcgill Date:    09/15/2023 Time:    16:16    X-Ray Ankle Complete Left    Result Date: 9/15/2023  EXAMINATION: XR ANKLE COMPLETE 3 VIEW LEFT CLINICAL HISTORY: Pain in unspecified ankle and joints of unspecified foot TECHNIQUE: AP, lateral and oblique views of the left ankle COMPARISON: None FINDINGS: Comminuted fracture of the tibial plafond with mild overall displacement.     Comminuted tibial plafond fracture. Electronically signed by: Claudy Mcgill Date:    09/15/2023 Time:    15:53    X-Ray Ankle Complete Right    Result Date: 9/15/2023  EXAMINATION: XR ANKLE COMPLETE 3 VIEW RIGHT CLINICAL HISTORY: Pain in unspecified ankle and joints of unspecified foot TECHNIQUE: AP, lateral, and oblique images of the right ankle COMPARISON: None FINDINGS: Oblique fracture through the distal tibia with anterior displacement and lateral angulation.  Mildly displaced fracture of the medial malleolus.  No definitive fibular fracture.     Fractures of the distal tibia and medial malleolus. Electronically signed by: Claudy Mcgill Date:    09/15/2023 Time:    15:51      Assessment/Plan:     There are no hospital problems to display for this patient.    All imaging reviewed with the patient.  Recommend surgical fixation with  application of external fixator to bilateral pilon fractures to provide fracture stabilization and allow for appropriate tissue healing.  Patient will require definitive fixation in the future.  We had an extensive discussion about the surgical procedure, the perioperative recovery, and postoperative recovery.  The proposed procedure as well as associated risks/benefits were discussed at length with the patient and family with risks to include pain, bleeding, infection, future surgeries, neurovascular compromise, loss of limb, heart attack, stroke, deep vein thrombosis, and even death.  Patient understands risks, benefits, and alternatives.  Patient signed an informed consent.  Spouse present at bedside.      The above findings, diagnostics, and treatment plan were discussed with Felix Charles MD who personally examined the patient and is in agreement with the plan of care except as stated in additional documentation.    MARV Romero  Orthopedic Surgery  [facility] - Emergency Dept    This note was created with the assistance of voice recognition software or phone dictation.  There may be transcription errors as a result of using this technology however minimal. Effort has been made to assure accuracy of transcription but any obvious errors or omissions should be clarified with the author of the document.

## 2023-09-15 NOTE — ANESTHESIA PREPROCEDURE EVALUATION
09/15/2023  Janeen Arrieta is a 47 y.o., female.      Pre-op Assessment    I have reviewed the Patient Summary Reports.     I have reviewed the Nursing Notes. I have reviewed the NPO Status.   I have reviewed the Medications.     Review of Systems  Anesthesia Hx:  No problems with previous Anesthesia    Hematology/Oncology:  Hematology Normal   Oncology Normal     EENT/Dental:EENT/Dental Normal   Cardiovascular:  Cardiovascular Normal     Pulmonary:  Pulmonary Normal    Renal/:  Renal/ Normal     Hepatic/GI:  Hepatic/GI Normal    Musculoskeletal:  Musculoskeletal Normal    Neurological:  Neurology Normal    Endocrine:  Endocrine Normal    Dermatological:  Skin Normal    Psych:  Psychiatric Normal           Physical Exam  General: Well nourished, Cooperative, Alert and Oriented    Airway:  Mallampati: II   Mouth Opening: Normal  TM Distance: Normal  Tongue: Normal  Neck ROM: Normal ROM    Dental:  Intact    Chest/Lungs:  Clear to auscultation    Heart:  Rate: Normal        Anesthesia Plan  Type of Anesthesia, risks & benefits discussed:    Anesthesia Type: Gen ETT  Intra-op Monitoring Plan: Standard ASA Monitors  Post Op Pain Control Plan: multimodal analgesia  Induction:  IV and rapid sequence  Airway Plan: Direct  Informed Consent: Informed consent signed with the Patient and all parties understand the risks and agree with anesthesia plan.  All questions answered.   ASA Score: 1 Emergent  Day of Surgery Review of History & Physical: I have interviewed and examined the patient. I have reviewed the patient's H&P dated:     Ready For Surgery From Anesthesia Perspective.     .

## 2023-09-15 NOTE — ED PROVIDER NOTES
Encounter Date: 9/15/2023    SCRIBE #1 NOTE: I, Kevon Alex, am scribing for, and in the presence of,  Dr. Vogel. I have scribed the following portions of the note - Other sections scribed: HPI, ROS, Physical Exam, MDM, Attending.       History     Chief Complaint   Patient presents with    Trauma     Fell from the ladder approximately 10-12ft, denies LOC. Noted with significant swelling to bilateral ankle and deformity to tib, left     46 y/o female presents to ED via EMS as level 2 trauma following fall onto her feet from a 14 foot ladder with -LOC.  Pt states she lost her balance, causing her to fall off the ladder.  She complains of bilateral ankle pain, and nurse reports bilateral ankle deformities.  Pt did not hit her head, and she denies neck pain, knee pain or hip pain.    The history is provided by the patient.     Review of patient's allergies indicates:  No Known Allergies  History reviewed. No pertinent past medical history.  Past Surgical History:   Procedure Laterality Date    APPLICATION, EXTERNAL FIXATION DEVICE Bilateral 9/15/2023    Procedure: APPLICATION, EXTERNAL FIXATION DEVICE;  Surgeon: Felix Charles MD;  Location: [facility];  Service: Orthopedics;  Laterality: Bilateral;  PUNCTURE WOUNDS ONLY    OPEN REDUCTION AND INTERNAL FIXATION (ORIF) OF PILON FRACTURE Right 9/17/2023    Procedure: ORIF, FRACTURE, PILON;  Surgeon: Ryan Humphrey DO;  Location: [facility];  Service: Orthopedics;  Laterality: Right;  Supine vascular bed bone foam c arm Manipulate Ex fix on L with Yamel Ex fix stuff, Fix Right ankle. Need to remova  ex fix, Synthes medial pilon plate, CCHS 4.0    REVISION OF PROCEDURE INVOLVING EXTERNAL FIXATION DEVICE Left 9/17/2023    Procedure: REVISION, OF EXTERNAL FIXATION;  Surgeon: Ryan Humphrey DO;  Location: [facility];  Service: Orthopedics;  Laterality: Left;     History reviewed. No pertinent family history.     Review of Systems   Respiratory:  Negative for shortness of breath.     Cardiovascular:  Negative for chest pain and leg swelling.   Gastrointestinal:  Negative for abdominal distention and abdominal pain.   Musculoskeletal:  Positive for arthralgias (bilateral) and neck pain.       Physical Exam     Initial Vitals [09/15/23 1458]   BP Pulse Resp Temp SpO2   (!) 145/108 85 19 99.5 °F (37.5 °C) 98 %      MAP       --         Physical Exam    Nursing note and vitals reviewed.  Constitutional: She appears well-developed and well-nourished. She is not diaphoretic. No distress.   Airway intact      HENT:   Head: Normocephalic and atraumatic.   Nose: Nose normal.   Mouth/Throat: Oropharynx is clear and moist.   Eyes: EOM are normal. Pupils are equal, round, and reactive to light.   Neck: Neck supple.   Normal range of motion.  Cardiovascular:  Normal rate and regular rhythm.           No murmur heard.  Intact doppler signal to L dorsalis pedis; 2+ radial pulses bilaterally; 2+ R dorsalis pedis pulse    Pulmonary/Chest: Breath sounds normal. No respiratory distress. She has no wheezes. She has no rales.   Abdominal: Abdomen is soft. She exhibits no distension. There is no abdominal tenderness.   Musculoskeletal:      Cervical back: Normal range of motion and neck supple.      Comments: Pelvis stable and nontender; swelling to bilateral ankles (R greater than L), deformity to bilateral ankles     Neurological: She is alert and oriented to person, place, and time. She has normal strength. No cranial nerve deficit or sensory deficit. GCS score is 15. GCS eye subscore is 4. GCS verbal subscore is 5. GCS motor subscore is 6.   Skin: Skin is warm. Capillary refill takes less than 2 seconds. No rash noted.   Superficial abrasion to anterior R forearm          ED Course   Splint Application    Date/Time: 9/15/2023 9:29 PM    Performed by: Alexis Vogel MD  Authorized by: Alexis Vogel MD  Consent Done: Emergent Situation  Location details: right ankle  Splint type: short leg (with  stirrup)  Supplies used: Ortho-Glass  Post-procedure: The splinted body part was neurovascularly unchanged following the procedure.  Patient tolerance: Patient tolerated the procedure well with no immediate complications      Splint Application    Date/Time: 9/15/2023 9:30 PM    Performed by: Alexis Vogel MD  Authorized by: Alexis Vogel MD  Consent Done: Emergent Situation  Location details: left ankle (with stirrup)  Splint type: short leg  Supplies used: Ortho-Glass  Post-procedure: The splinted body part was neurovascularly unchanged following the procedure.  Patient tolerance: Patient tolerated the procedure well with no immediate complications      Critical Care    Date/Time: 9/15/2023 5:00 PM    Performed by: Alexis Vogel MD  Authorized by: Alexis Vogel MD  Direct patient critical care time: 35 minutes  Total critical care time (exclusive of procedural time) : 35 minutes  Critical care time was exclusive of separately billable procedures and treating other patients.  Critical care was necessary to treat or prevent imminent or life-threatening deterioration of the following conditions: trauma.  Critical care was time spent personally by me on the following activities: development of treatment plan with patient or surrogate, discussions with consultants, examination of patient, obtaining history from patient or surrogate, ordering and performing treatments and interventions, ordering and review of laboratory studies, ordering and review of radiographic studies, pulse oximetry, re-evaluation of patient's condition and review of old charts.        Labs Reviewed   COMPREHENSIVE METABOLIC PANEL - Abnormal; Notable for the following components:       Result Value    Glucose Level 127 (*)     All other components within normal limits   LACTIC ACID, PLASMA - Abnormal; Notable for the following components:    Lactic Acid Level 2.5 (*)     All other components within normal limits    CBC WITH DIFFERENTIAL - Abnormal; Notable for the following components:    WBC 12.05 (*)     IG# 0.06 (*)     All other components within normal limits   PROTIME-INR - Normal   APTT - Normal   ALCOHOL,MEDICAL (ETHANOL) - Normal   LACTIC ACID, PLASMA - Normal   CBC W/ AUTO DIFFERENTIAL    Narrative:     The following orders were created for panel order CBC auto differential.  Procedure                               Abnormality         Status                     ---------                               -----------         ------                     CBC with Differential[[id number]]       Abnormal            Final result                 Please view results for these tests on the individual orders.   TYPE & SCREEN   ABORH RETYPE          Imaging Results              X-Ray Tibia Fibula 2 View Left (Final result)  Result time 09/15/23 17:10:26      Final result by Jarett Montesinos MD (09/15/23 17:10:26)                   Impression:      Comminuted fracture of the distal tibia with displacement      Electronically signed by: Jarett Montesinos  Date:    09/15/2023  Time:    17:10               Narrative:    EXAMINATION:  XR TIBIA FIBULA 2 VIEW LEFT    CLINICAL HISTORY:  Injury, unspecified, initial encounter    TECHNIQUE:  AP and lateral views of the left tibia and fibula were performed.    COMPARISON:  None.    FINDINGS:  There is a comminuted fracture of the distal tibia.  There is displacement seen.  There is diffuse soft tissue swelling in the ankle.                                       X-Ray Tibia Fibula 2 View Right (Final result)  Result time 09/15/23 17:15:40      Final result by Jarett Montesinos MD (09/15/23 17:15:40)                   Impression:      Comminuted fracture distal tibia      Electronically signed by: Jarett Montesinos  Date:    09/15/2023  Time:    17:15               Narrative:    EXAMINATION:  XR TIBIA FIBULA 2 VIEW RIGHT    CLINICAL HISTORY:  Injury, unspecified, initial  encounter    TECHNIQUE:  AP and lateral views of the right tibia and fibula were performed.    COMPARISON:  None.    FINDINGS:  There is a comminuted fracture of the distal tibia.  It is displaced.  No dislocation is seen.  The fibula is intact.  There is diffuse soft tissue swelling in the ankle.                                       X-Ray Forearm Right (Final result)  Result time 09/15/23 16:22:23      Final result by Claudy Mcgill MD (09/15/23 16:22:23)                   Impression:      No acute osseous process appreciated.      Electronically signed by: Claudy Mcgill  Date:    09/15/2023  Time:    16:22               Narrative:    EXAMINATION:  XR FOREARM RIGHT    CLINICAL HISTORY:  Pain in arm, unspecified    TECHNIQUE:  AP and lateral views of the right forearm    COMPARISON:  None    FINDINGS:  No acute fracture identified.  Joint alignments are maintained.  Small olecranon enthesophyte.  Few small areas of retained debris along the skin surface of the upper forearm.                                       CT Chest Abdomen Pelvis With Contrast (xpd) (Final result)  Result time 09/15/23 16:13:08      Final result by Jarett Montesinos MD (09/15/23 16:13:08)                   Impression:      No evidence of acute trauma      Electronically signed by: Jarett Montesinos  Date:    09/15/2023  Time:    16:13               Narrative:    EXAMINATION:  CT CHEST ABDOMEN PELVIS WITH CONTRAST (XPD)    CLINICAL HISTORY:  Trauma;    TECHNIQUE:  Low dose axial images, sagittal and coronal reformations were obtained from the thoracic inlet to the pubic symphysis following the IV contrast administration. Automatic exposure control is utilized to reduce patient radiation exposure.    COMPARISON:  None    FINDINGS:  The lungs are adequately aerated.  No pneumothorax is seen.  No pulmonary contusion is seen.  No pleural effusion is seen.  No infiltrate is seen.    The thoracic aorta is normal in caliber.  No dissection  or aneurysm is seen.  No retrosternal hematoma is seen.    The abdominal aorta appears grossly unremarkable.  No dissection or posttraumatic changes are seen.    The heart appears normal.    The liver appears normal.  No liver mass or lesion is seen.  No evidence of liver laceration is seen.    The gallbladder appears normal.  No gallstones are seen..    The spleen appears normal.  No splenic laceration is seen.  The pancreas appears grossly unremarkable.  No pancreatic mass or lesion is seen.  No inflammation is seen.    No adrenal abnormality is seen.  No adrenal nodule is seen.    The kidneys are well perfused.  No hydronephrosis is seen.  No hydroureter is seen.  No retroperitoneal hematoma is seen.    Visualized portions of the bowel shows no acute abnormality.  No colitis is seen.  No diverticulitis is seen.  No colonic mass is seen.    No free air is seen.  No free fluid is seen.    Urinary bladder appears unremarkable.  The uterus is slightly heterogeneous and there findings consistent with uterine fibroids.    No sternal fracture is seen.  No thoracic spine fracture is seen.  No lumbar spine fracture is seen.  No pelvic fracture is seen.  No rib fractures are seen.                                       CT Ankle (Including Hindfoot) Without Contrast Bilateral (Final result)  Result time 09/15/23 16:16:59   Procedure changed from CT Ankle (Including Hindfoot) Without Contrast Right     Final result by Claudy Mcgill MD (09/15/23 16:16:59)                   Impression:      Comminuted bilateral tibial fractures.  Several tiny chip fractures arising from the distal right fibula.  Several additional tiny fracture fragments positioned between the distal left fibula and talus are of unclear origin.      Electronically signed by: Claudy Mcgill  Date:    09/15/2023  Time:    16:16               Narrative:    EXAMINATION:  CT ANKLE (INCLUDING HINDFOOT) WITHOUT CONTRAST BILATERAL    CLINICAL HISTORY:  Fracture,  ankle;    TECHNIQUE:  Noncontrast CT imaging of both ankles.  Axial, coronal and sagittal reformatted images reviewed.   Dose length product is 105 mGycm. Automatic exposure control, adjustment of mA/kV or iterative reconstruction technique used to limit radiation dose.    COMPARISON:  Radiographs earlier today    FINDINGS:  On the right, there is a comminuted fracture of the distal tibia with displacement of to 7 mm.  Largest fracture fragment is laterally angulated.  Minimally displaced transverse fracture through the medial malleolus.  Few tiny chip fractures near the tip of the distal fibula.    On the left, there is also a comminuted tibial fracture with extension into the medial malleolus.  On this side, fragments are displaced by up to about 2 cm.  Several small osseous fragments lie between the distal fibula and talus.                                       CT Cervical Spine Without Contrast (Final result)  Result time 09/15/23 16:06:04      Final result by Jarett Montesinos MD (09/15/23 16:06:04)                   Impression:      Loss of the normal lordotic curve of the cervical spine most likely related to spasm but otherwise unremarkable with no evidence of acute fracture or dislocation seen    Incidental note is made of some degenerative changes in the  cervical spine      Electronically signed by: Jarett Montesinos  Date:    09/15/2023  Time:    16:06               Narrative:    EXAMINATION:  CT CERVICAL SPINE WITHOUT CONTRAST    CLINICAL HISTORY:  Trauma;    TECHNIQUE:  Low dose axial images, sagittal and coronal reformations were performed though the cervical spine.  Contrast was not administered. Automatic exposure control is utilized to reduce patient radiation exposure.    COMPARISON:  None    FINDINGS:  The vertebral body heights are well maintained. There is some loss of the normal lordotic curve cervical spine most likely related to spasm. No fracture is seen. No dislocation is seen. The  odontoid and lateral masses appear grossly unremarkable.  There are some degenerative changes seen in the cervical spine which appear to be chronic.                                       CT Head Without Contrast (Final result)  Result time 09/15/23 16:07:16      Final result by Jase Arriaga MD (09/15/23 16:07:16)                   Narrative:    EXAMINATION  CT HEAD WITHOUT CONTRAST    CLINICAL HISTORY  Trauma;    TECHNIQUE  Axial non-contrast CT images of the head were acquired and multiplanar reconstructions accomplished by a CT technologist at a separate workstation, pushed to PACS for physician review.    COMPARISON  None available at the time of the initial interpretation.    FINDINGS  Images were reviewed in subdural, brain, soft tissue, and bone windows.    Exam quality: Motion/streak artifact limits assessment of the posterior fossa.    Hemorrhage: No evidence of acute hyperattenuating blood products.    Parenchyma: No discrete mass, localized mass-effect, or CT evidence of an acute territorial cortical-based ischemic insult. Gray-white differentiation is preserved.    Midline shift: None.    CSF spaces: Normal ventricle size and configuration. No extra-axial masses or expansile fluid collections.    Vasculature: No hyperdense artery identified. No abnormal densities within the dural sinuses.    Other findings: No abnormalities of the scalp or subjacent osseous structures. Mastoids are well aerated. No focal abnormality of the sella. The included facial structures are unremarkable.    IMPRESSION  No CT-evident acute intracranial abnormality.    RADIATION DOSE  Automated tube current modulation, weight-based exposure dosing, and/or iterative reconstruction technique utilized to reach lowest reasonably achievable exposure rate.    DLP: 1380 mGy*cm      Electronically signed by: Jase Arriaga  Date:    09/15/2023  Time:    16:07                                     X-Ray Ankle Complete Left (Final result)   Result time 09/15/23 15:53:23      Final result by Claudy Mcgill MD (09/15/23 15:53:23)                   Impression:      Comminuted tibial plafond fracture.      Electronically signed by: Claudy Mcgill  Date:    09/15/2023  Time:    15:53               Narrative:    EXAMINATION:  XR ANKLE COMPLETE 3 VIEW LEFT    CLINICAL HISTORY:  Pain in unspecified ankle and joints of unspecified foot    TECHNIQUE:  AP, lateral and oblique views of the left ankle    COMPARISON:  None    FINDINGS:  Comminuted fracture of the tibial plafond with mild overall displacement.                                       X-Ray Ankle Complete Right (Final result)  Result time 09/15/23 15:51:53      Final result by Claudy Mcgill MD (09/15/23 15:51:53)                   Impression:      Fractures of the distal tibia and medial malleolus.      Electronically signed by: Claudy Mcgill  Date:    09/15/2023  Time:    15:51               Narrative:    EXAMINATION:  XR ANKLE COMPLETE 3 VIEW RIGHT    CLINICAL HISTORY:  Pain in unspecified ankle and joints of unspecified foot    TECHNIQUE:  AP, lateral, and oblique images of the right ankle    COMPARISON:  None    FINDINGS:  Oblique fracture through the distal tibia with anterior displacement and lateral angulation.  Mildly displaced fracture of the medial malleolus.  No definitive fibular fracture.                                       X-Ray Pelvis Routine AP (Final result)  Result time 09/15/23 15:32:29      Final result by Jase Arriaga MD (09/15/23 15:32:29)                   Narrative:    EXAMINATION  XR PELVIS ROUTINE AP    CLINICAL HISTORY  r/o bleeding or hemorrhage;    TECHNIQUE  A total of 1 image(s) submitted of the pelvis.    COMPARISON  None available at the time of initial interpretation.    FINDINGS  No displaced fracture or dislocation is identified. The visualized pelvic ring structures and articular spaces are preserved. No evidence of a large joint effusion is appreciated.  No aggressive osseous lesion or periosteal reaction is appreciated. The trabecular pattern is unremarkable.    The included soft tissues are without acute abnormality.    IMPRESSION  No convincing acute abnormality.      Electronically signed by: Jase Arriaga  Date:    09/15/2023  Time:    15:32                                     X-Ray Chest 1 View (Final result)  Result time 09/15/23 15:32:02      Final result by Jase Arriaga MD (09/15/23 15:32:02)                   Narrative:    EXAMINATION  XR CHEST 1 VIEW    CLINICAL HISTORY  r/o bleeding or hemorrhage;    TECHNIQUE  A total of 1 frontal image(s) of the chest.    COMPARISON  None available at the time of initial interpretation.    FINDINGS  Lines/tubes/devices: ECG leads overlie the imaged region.    The cardiomediastinal silhouette and central pulmonary vasculature are unremarkable for utilized technique.  The trachea is midline. There is no lobar consolidation, pleural effusion, or pneumothorax.    There is no acute osseous or extrathoracic abnormality.    IMPRESSION  No convincing acute radiographic abnormality.      Electronically signed by: Jase Arriaga  Date:    09/15/2023  Time:    15:32                                     Medications   sodium chloride 0.9% flush 10 mL (has no administration in time range)   acetaminophen tablet 650 mg (has no administration in time range)   ondansetron injection 4 mg (has no administration in time range)   0.9%  NaCl infusion ( Intravenous New Bag 9/15/23 2030)   gabapentin capsule 300 mg (300 mg Oral Given 9/19/23 2227)   aluminum-magnesium hydroxide-simethicone 200-200-20 mg/5 mL suspension 30 mL (30 mLs Oral Given 9/18/23 0530)   morphine injection 4 mg (has no administration in time range)   docusate sodium capsule 200 mg (200 mg Oral Not Given 9/19/23 0900)   senna-docusate 8.6-50 mg per tablet 2 tablet (2 tablets Oral Given 9/19/23 2226)   polyethylene glycol packet 17 g (17 g Oral Given 9/19/23 2227)    lactulose 20 gram/30 mL solution Soln 20 g (has no administration in time range)   melatonin tablet 6 mg (6 mg Oral Given 9/19/23 2227)   ondansetron injection 4 mg (has no administration in time range)   famotidine tablet 20 mg (20 mg Oral Given 9/19/23 2226)   ketorolac injection 30 mg (30 mg Intravenous Given 9/17/23 2317)   electrolyte-A infusion (has no administration in time range)   lactated ringers infusion (has no administration in time range)   electrolyte-A infusion (has no administration in time range)   ceFAZolin 2 g/50mL Dextrose IVPB (ANCEF) 2 gram/50 mL IVPB PgBk (2,000 mg  Not Given 9/17/23 2118)   midazolam (VERSED) 1 mg/mL injection (has no administration in time range)   enoxaparin injection 40 mg (40 mg Subcutaneous Given 9/19/23 1653)   methocarbamoL tablet 750 mg (750 mg Oral Given 9/19/23 2227)   sodium chloride 0.9% flush 3 mL (has no administration in time range)   0.9%  NaCl infusion ( Intravenous Canceled Entry 9/18/23 0800)   oxyCODONE immediate release tablet 5 mg (has no administration in time range)   oxyCODONE immediate release tablet Tab 10 mg (10 mg Oral Given 9/19/23 2226)   ketorolac injection 15 mg (15 mg Intravenous Given 9/19/23 1935)   Tdap (BOOSTRIX) vaccine injection 0.5 mL (0.5 mLs Intramuscular Given 9/15/23 1500)   fentaNYL injection 50 mcg (50 mcg Intravenous Given 9/15/23 1503)   ondansetron injection 4 mg (4 mg Intravenous Given 9/15/23 1503)   lactated ringers bolus 1,000 mL (0 mLs Intravenous Stopped 9/15/23 1603)   HYDROmorphone (PF) injection 1 mg (1 mg Intravenous Given 9/15/23 1545)   iopamidoL (ISOVUE-370) injection 100 mL (100 mLs Intravenous Given 9/15/23 1559)   cefazolin (ANCEF) 2 gram in dextrose 5% 50 mL IVPB (premix) (2 g Intravenous Given 9/15/23 1815)   cefazolin (ANCEF) 2 gram in dextrose 5% 50 mL IVPB (premix) (0 g Intravenous Stopped 9/16/23 9075)   acetaminophen 1,000 mg/100 mL (10 mg/mL) injection 1,000 mg (0 mg Intravenous Stopped 9/16/23 9167)    bisacodyL suppository 10 mg (10 mg Rectal Given 9/18/23 0530)   methocarbamoL injection 750 mg (750 mg Intravenous Given 9/15/23 1942)   oxyCODONE-acetaminophen 5-325 mg per tablet 1 tablet (1 tablet Oral Given 9/16/23 0930)   midazolam (VERSED) 1 mg/mL injection 2 mg (2 mg Intravenous Given 9/17/23 1028)   ROPIvacaine 0.5% (PF) (NAROPIN) 5 mg/mL (0.5 %) injection (  Override pull for Anesthesia 9/17/23 1101)   cefazolin (ANCEF) 2 gram in dextrose 5% 50 mL IVPB (premix) (0 g Intravenous Stopped 9/18/23 1236)   acetaminophen 1,000 mg/100 mL (10 mg/mL) injection 1,000 mg (0 mg Intravenous Stopped 9/19/23 1337)   sodium chloride 0.9% bolus 500 mL 500 mL (0 mLs Intravenous Stopped 9/19/23 1623)     Medical Decision Making  Amount and/or Complexity of Data Reviewed  Labs: ordered.  Radiology: ordered. Decision-making details documented in ED Course.    Risk  OTC drugs.  Prescription drug management.  Decision regarding hospitalization.    Differential diagnosis (includes but is not limited to):   ICH, spinal injury, fracture, dislocation, neurovascular injury    MDM Narrative  47-year-old female presents for evaluation as a trauma activation after a fall from a ladder of anywhere between 12 and 15 ft.  Patient with significant swelling and deformities to bilateral ankles.  She denies any neck or back pain.  She denies any chest or abdominal pain.  Labs are pending.  X-rays pending.  CT scans pending.  Pain and nausea control, narcotics as needed.  Telemetry monitor independently reviewed and shows a sinus rhythm with heart rate in the 70s.  Trauma surgery at bedside per trauma activation protocol.    Update:  Labs reviewed.  Imaging reviewed.  Patient with bilateral comminuted tibial ankle fractures.  Bilateral lower extremities splinted.  Orthopedic surgery consulted and recommends operative intervention with external fixation.  Orthopedic surgery recommends admission to the Orthopedic Service.  Trauma surgery  notified.    Dispo: Admit    My independent radiology interpretation: as above  Point of care US (independently performed and interpreted):   Decision rules/clinical scoring:     Sepsis Perfusion Assessment:     Amount and/or Complexity of Data Reviewed  Independent historian: EMS   Summary of history:   Report received from EMS on arrival including chief complaint, vital signs, medications  External data reviewed: notes from previous admissions, notes from previous ED visits, and notes from clinic visits  Summary of data reviewed:  prior records reviewed  Risk and benefits of testing: discussed   Labs: ordered and reviewed  Radiology: ordered and independent interpretation performed (see above or ED course)  ECG/medicine tests: ordered and independent interpretation performed (see above or ED course)  Discussion of management or test interpretation with external provider(s): discussed with trauma surgery, orthopedic surgery consultant   Summary of discussion:  as above    Risk  Parenteral controlled substances   Drug therapy requiring intense monitoring for toxicity   Decision regarding hospitalization  Shared decision making     Critical Care  30-74 minutes     Data Reviewed/Counseling: I have personally reviewed the patient's vital signs, nursing notes, and other relevant tests, information, and imaging. I had a detailed discussion regarding the historical points, exam findings, and any diagnostic results supporting the discharge diagnosis. I personally performed the history, PE, MDM and procedures as documented above and agree with the scribe's documentation.    Portions of this note were dictated using voice recognition software. Although it was reviewed for accuracy, some inherent voice recognition errors may have occurred and may be present in this document.          Scribe Attestation:   Scribe #1: I performed the above scribed service and the documentation accurately describes the services I performed. I  attest to the accuracy of the note.    Attending Attestation:           Physician Attestation for Scribe:  Physician Attestation Statement for Scribe #1: I, reviewed documentation, as scribed by Kevon Alex in my presence, and it is both accurate and complete.             ED Course as of 09/20/23 0135   Fri Sep 15, 2023   1545 X-Ray Chest 1 View  Independently visualized/reviewed by me during the ED visit.  - No PTX, no displaced rib fx [MC]   1545 X-Ray Pelvis Routine AP  Independently visualized/reviewed by me during the ED visit.  - No acute fracture or dislocation [MC]   1625 X-Ray Forearm Right  Independently visualized/reviewed by me during the ED visit.  - No acute fracture or dislocation [MC]   1632 X-Ray Ankle Complete Left  Independently visualized/reviewed by me during the ED visit.  - Comminuted distal tibial ankle fracture [MC]   1633 X-Ray Ankle Complete Right  Independently visualized/reviewed by me during the ED visit.  - Comminuted distal tibial ankle fracture [MC]   1633 Orthopedic surgery consulted. [MC]   1633 X-Ray Tibia Fibula 2 View Right  Independently visualized/reviewed by me during the ED visit.  - Comminuted distal tib fx [MC]   1633 X-Ray Tibia Fibula 2 View Left  Independently visualized/reviewed by me during the ED visit.  - Comminuted distal tib fx [MC]      ED Course User Index  [MC] Alexis Vogel MD                    Clinical Impression:   Final diagnoses:  [M25.579] Ankle pain  [M79.603] Arm pain  [T14.90XA] Trauma  [S82.891A, S82.892A] Bilateral ankle fractures        ED Disposition Condition    Admit Stable                Alexis Vogel MD  09/20/23 0135

## 2023-09-15 NOTE — CONSULTS
"   Trauma Surgery   Activation Note    Patient Name: Janeen Arrieta  MRN: [id number]   YOB: 1975  Date: 09/15/2023    LEVEL 2 TRAUMA     Subjective:   History of present illness: Patient is an approximately 47 year old female presenting with bilateral ankle pain s/p fall from 17ft ladder. Patient denies LOC, is stable and speaking on exam, denies neck/back pain. On exam, has superficial right arm laceration which appears hemostatic, and bilateral ankle swelling. 2+ RDP, triphasic L DP signal    Primary Survey:  A Intact, speaking   B Satting well on RA   C HDS   D GCS 15(E 4, V 5, M 6)    E exposed, log-rolled and examined (see below)   F See below     VITAL SIGNS: 24 HR MIN & MAX LAST   Temp  Min: 99.5 °F (37.5 °C)  Max: 99.5 °F (37.5 °C)  99.5 °F (37.5 °C)   BP  Min: 135/79  Max: 145/108  135/79    Pulse  Min: 83  Max: 85  83    Resp  Min: 19  Max: 19  19    SpO2  Min: 98 %  Max: 99 %  99 %      HT: 5' 4" (162.6 cm)  WT: 93.4 kg (206 lb)  BMI: 35.3     FAST: deferred    Medications/transfusions received en-route:   Medications/transfusions received in trauma bay: 50 mcg fentanyl, zofran, Tdap    Scheduled Meds:   lactated ringers  1,000 mL Intravenous ED 1 Time     Continuous Infusions: LR  PRN Meds:    ROS: 12 point ROS negative except as stated in HPI    Allergies: NKDA  PMH: Reviewed. No past medical problems.  PSH: Reviewed. No surgeries in the past.  Social history: Noncontributory  Objective:   Secondary Survey:   General: Well developed, well nourished, no acute distress, AAOx3  Neuro: CNII-XII grossly intact  HEENT:  Normocephalic, atraumatic, PERRL, cervical collar in place  CV:  RRR  Pulse: 2+ RP b/l, 2+ DP RLE, triphasic DP LLE  Resp/chest:  Non-labored breathing, satting on room air  GI:  Abdomen soft, non-tender, non-distended  :  Normal external female genitalia,  no blood at urethral meatus.   Rectal: Normal tone, no gross blood.  Extremities: Moves all 4 spontaneously and " purposefully, no obvious gross deformities.  Back/Spine: No bony TTP, no palpable step offs or deformities.  Cervical back: Normal. No tenderness.  Thoracic back: Normal. No tenderness.  Lumbar back: Normal. No tenderness.  Skin/wounds:  Warm, well perfused, RUE superficial laceration  Psych: Normal mood and affect.    Labs:  pending    Imaging:  CXR no acute traumatic injury on my review    Assessment & Plan:   47F presenting as level 2 trauma activation s/p fall from 17ft ladder. Has bilateral ankle swelling.    - CT ankle, head/c-spine pending  - CT C/A/P ordered   - dispo pending results of the above    Kodi Martin MD  [employer] General Surgery PGY4

## 2023-09-15 NOTE — ANESTHESIA PROCEDURE NOTES
Intubation    Date/Time: 9/15/2023 6:12 PM    Performed by: Gopal Parnell CRNA  Authorized by: Adrian Bundy MD    Intubation:     Induction:  Intravenous    Intubated:  Postinduction    Mask Ventilation:  Easy mask    Attempts:  1    Attempted By:  CRNA    Method of Intubation:  Direct    Blade:  Brigido 3    Laryngeal View Grade: Grade I - full view of cords      Difficult Airway Encountered?: No      Complications:  None    Airway Device:  Oral endotracheal tube    Airway Device Size:  7.5    Style/Cuff Inflation:  Cuffed    Inflation Amount (mL):  5    Tube secured:  19    Secured at:  The lips    Placement Verified By:  Capnometry and Colorimetric ETCO2 device    Complicating Factors:  None    Findings Post-Intubation:  BS equal bilateral and atraumatic/condition of teeth unchanged

## 2023-09-16 LAB
ALBUMIN SERPL-MCNC: 3.9 G/DL (ref 3.5–5)
ALBUMIN/GLOB SERPL: 1.3 RATIO (ref 1.1–2)
ALP SERPL-CCNC: 96 UNIT/L (ref 40–150)
ALT SERPL-CCNC: 14 UNIT/L (ref 0–55)
AMPHET UR QL SCN: NEGATIVE
APPEARANCE UR: CLEAR
AST SERPL-CCNC: 16 UNIT/L (ref 5–34)
BACTERIA #/AREA URNS AUTO: NORMAL /HPF
BARBITURATE SCN PRESENT UR: NEGATIVE
BASOPHILS # BLD AUTO: 0.02 X10(3)/MCL
BASOPHILS NFR BLD AUTO: 0.2 %
BENZODIAZ UR QL SCN: POSITIVE
BILIRUB SERPL-MCNC: 0.8 MG/DL
BILIRUB UR QL STRIP.AUTO: NEGATIVE
BUN SERPL-MCNC: 8.4 MG/DL (ref 7–18.7)
CALCIUM SERPL-MCNC: 9 MG/DL (ref 8.4–10.2)
CANNABINOIDS UR QL SCN: NEGATIVE
CHLORIDE SERPL-SCNC: 106 MMOL/L (ref 98–107)
CO2 SERPL-SCNC: 23 MMOL/L (ref 22–29)
COCAINE UR QL SCN: NEGATIVE
COLOR UR: YELLOW
CREAT SERPL-MCNC: 0.81 MG/DL (ref 0.55–1.02)
EOSINOPHIL # BLD AUTO: 0 X10(3)/MCL (ref 0–0.9)
EOSINOPHIL NFR BLD AUTO: 0 %
ERYTHROCYTE [DISTWIDTH] IN BLOOD BY AUTOMATED COUNT: 13.7 % (ref 11.5–17)
ERYTHROCYTE [DISTWIDTH] IN BLOOD BY AUTOMATED COUNT: 14.1 % (ref 11.5–17)
FENTANYL UR QL SCN: POSITIVE
GFR SERPLBLD CREATININE-BSD FMLA CKD-EPI: >60 MLS/MIN/1.73/M2
GLOBULIN SER-MCNC: 3.1 GM/DL (ref 2.4–3.5)
GLUCOSE SERPL-MCNC: 128 MG/DL (ref 74–100)
GLUCOSE UR QL STRIP.AUTO: NEGATIVE
HCT VFR BLD AUTO: 37.1 % (ref 37–47)
HCT VFR BLD AUTO: 38.1 % (ref 37–47)
HGB BLD-MCNC: 12 G/DL (ref 12–16)
HGB BLD-MCNC: 12.4 G/DL (ref 12–16)
IMM GRANULOCYTES # BLD AUTO: 0.03 X10(3)/MCL (ref 0–0.04)
IMM GRANULOCYTES NFR BLD AUTO: 0.3 %
KETONES UR QL STRIP.AUTO: NEGATIVE
LEUKOCYTE ESTERASE UR QL STRIP.AUTO: NEGATIVE
LYMPHOCYTES # BLD AUTO: 0.83 X10(3)/MCL (ref 0.6–4.6)
LYMPHOCYTES NFR BLD AUTO: 7.5 %
MCH RBC QN AUTO: 27.5 PG (ref 27–31)
MCH RBC QN AUTO: 27.9 PG (ref 27–31)
MCHC RBC AUTO-ENTMCNC: 32.3 G/DL (ref 33–36)
MCHC RBC AUTO-ENTMCNC: 32.5 G/DL (ref 33–36)
MCV RBC AUTO: 85.1 FL (ref 80–94)
MCV RBC AUTO: 85.6 FL (ref 80–94)
MDMA UR QL SCN: NEGATIVE
MONOCYTES # BLD AUTO: 0.48 X10(3)/MCL (ref 0.1–1.3)
MONOCYTES NFR BLD AUTO: 4.3 %
NEUTROPHILS # BLD AUTO: 9.71 X10(3)/MCL (ref 2.1–9.2)
NEUTROPHILS NFR BLD AUTO: 87.7 %
NITRITE UR QL STRIP.AUTO: NEGATIVE
NRBC BLD AUTO-RTO: 0 %
NRBC BLD AUTO-RTO: 0 %
OPIATES UR QL SCN: POSITIVE
PCP UR QL: NEGATIVE
PH UR STRIP.AUTO: 6 [PH]
PH UR: 6 [PH] (ref 3–11)
PLATELET # BLD AUTO: 258 X10(3)/MCL (ref 130–400)
PLATELET # BLD AUTO: 258 X10(3)/MCL (ref 130–400)
PMV BLD AUTO: 10.5 FL (ref 7.4–10.4)
PMV BLD AUTO: 10.8 FL (ref 7.4–10.4)
POTASSIUM SERPL-SCNC: 4.7 MMOL/L (ref 3.5–5.1)
PROT SERPL-MCNC: 7 GM/DL (ref 6.4–8.3)
PROT UR QL STRIP.AUTO: NEGATIVE
RBC # BLD AUTO: 4.36 X10(6)/MCL (ref 4.2–5.4)
RBC # BLD AUTO: 4.45 X10(6)/MCL (ref 4.2–5.4)
RBC #/AREA URNS AUTO: <5 /HPF
RBC UR QL AUTO: NEGATIVE
SODIUM SERPL-SCNC: 139 MMOL/L (ref 136–145)
SP GR UR STRIP.AUTO: 1.01 (ref 1–1.03)
SPECIFIC GRAVITY, URINE AUTO (.000) (OHS): 1.01 (ref 1–1.03)
SQUAMOUS #/AREA URNS AUTO: <5 /HPF
UROBILINOGEN UR STRIP-ACNC: 0.2
WBC # SPEC AUTO: 11.07 X10(3)/MCL (ref 4.5–11.5)
WBC # SPEC AUTO: 11.47 X10(3)/MCL (ref 4.5–11.5)
WBC #/AREA URNS AUTO: <5 /HPF

## 2023-09-16 PROCEDURE — 63600175 PHARM REV CODE 636 W HCPCS

## 2023-09-16 PROCEDURE — 80053 COMPREHEN METABOLIC PANEL: CPT | Performed by: STUDENT IN AN ORGANIZED HEALTH CARE EDUCATION/TRAINING PROGRAM

## 2023-09-16 PROCEDURE — 27000221 HC OXYGEN, UP TO 24 HOURS

## 2023-09-16 PROCEDURE — 21400001 HC TELEMETRY ROOM

## 2023-09-16 PROCEDURE — 25000003 PHARM REV CODE 250

## 2023-09-16 PROCEDURE — 80307 DRUG TEST PRSMV CHEM ANLYZR: CPT | Performed by: STUDENT IN AN ORGANIZED HEALTH CARE EDUCATION/TRAINING PROGRAM

## 2023-09-16 PROCEDURE — 99223 PR INITIAL HOSPITAL CARE,LEVL III: ICD-10-PCS | Mod: ,,,

## 2023-09-16 PROCEDURE — 97162 PT EVAL MOD COMPLEX 30 MIN: CPT

## 2023-09-16 PROCEDURE — 81001 URINALYSIS AUTO W/SCOPE: CPT | Performed by: STUDENT IN AN ORGANIZED HEALTH CARE EDUCATION/TRAINING PROGRAM

## 2023-09-16 PROCEDURE — 85025 COMPLETE CBC W/AUTO DIFF WBC: CPT | Performed by: STUDENT IN AN ORGANIZED HEALTH CARE EDUCATION/TRAINING PROGRAM

## 2023-09-16 PROCEDURE — 99223 1ST HOSP IP/OBS HIGH 75: CPT | Mod: ,,,

## 2023-09-16 PROCEDURE — 85027 COMPLETE CBC AUTOMATED: CPT

## 2023-09-16 PROCEDURE — 94761 N-INVAS EAR/PLS OXIMETRY MLT: CPT

## 2023-09-16 PROCEDURE — 63600175 PHARM REV CODE 636 W HCPCS: Performed by: ORTHOPAEDIC SURGERY

## 2023-09-16 RX ORDER — HYDROCODONE BITARTRATE AND ACETAMINOPHEN 7.5; 325 MG/1; MG/1
1 TABLET ORAL EVERY 4 HOURS PRN
Status: DISCONTINUED | OUTPATIENT
Start: 2023-09-16 | End: 2023-09-17

## 2023-09-16 RX ORDER — OXYCODONE AND ACETAMINOPHEN 5; 325 MG/1; MG/1
1 TABLET ORAL ONCE
Status: COMPLETED | OUTPATIENT
Start: 2023-09-16 | End: 2023-09-16

## 2023-09-16 RX ORDER — KETOROLAC TROMETHAMINE 30 MG/ML
30 INJECTION, SOLUTION INTRAMUSCULAR; INTRAVENOUS EVERY 8 HOURS PRN
Status: DISPENSED | OUTPATIENT
Start: 2023-09-16 | End: 2023-09-17

## 2023-09-16 RX ADMIN — ACETAMINOPHEN 1000 MG: 10 INJECTION, SOLUTION INTRAVENOUS at 03:09

## 2023-09-16 RX ADMIN — OXYCODONE HYDROCHLORIDE AND ACETAMINOPHEN 1 TABLET: 5; 325 TABLET ORAL at 09:09

## 2023-09-16 RX ADMIN — OXYCODONE HYDROCHLORIDE AND ACETAMINOPHEN 1 TABLET: 5; 325 TABLET ORAL at 03:09

## 2023-09-16 RX ADMIN — SENNOSIDES AND DOCUSATE SODIUM 2 TABLET: 50; 8.6 TABLET ORAL at 09:09

## 2023-09-16 RX ADMIN — CEFAZOLIN SODIUM 2 G: 2 SOLUTION INTRAVENOUS at 12:09

## 2023-09-16 RX ADMIN — OXYCODONE HYDROCHLORIDE AND ACETAMINOPHEN 1 TABLET: 5; 325 TABLET ORAL at 07:09

## 2023-09-16 RX ADMIN — ASPIRIN 81 MG CHEWABLE TABLET 81 MG: 81 TABLET CHEWABLE at 09:09

## 2023-09-16 RX ADMIN — HYDROCODONE BITARTRATE AND ACETAMINOPHEN 1 TABLET: 7.5; 325 TABLET ORAL at 10:09

## 2023-09-16 RX ADMIN — Medication 6 MG: at 09:09

## 2023-09-16 RX ADMIN — METHOCARBAMOL 750 MG: 750 TABLET ORAL at 06:09

## 2023-09-16 RX ADMIN — KETOROLAC TROMETHAMINE 30 MG: 30 INJECTION, SOLUTION INTRAMUSCULAR at 10:09

## 2023-09-16 RX ADMIN — HYDROCODONE BITARTRATE AND ACETAMINOPHEN 1 TABLET: 7.5; 325 TABLET ORAL at 01:09

## 2023-09-16 RX ADMIN — CEFAZOLIN SODIUM 2 G: 2 SOLUTION INTRAVENOUS at 06:09

## 2023-09-16 RX ADMIN — HYDROCODONE BITARTRATE AND ACETAMINOPHEN 1 TABLET: 7.5; 325 TABLET ORAL at 06:09

## 2023-09-16 RX ADMIN — DOCUSATE SODIUM 200 MG: 100 CAPSULE, LIQUID FILLED ORAL at 09:09

## 2023-09-16 RX ADMIN — GABAPENTIN 300 MG: 300 CAPSULE ORAL at 09:09

## 2023-09-16 RX ADMIN — FAMOTIDINE 20 MG: 20 TABLET, FILM COATED ORAL at 09:09

## 2023-09-16 RX ADMIN — CEFAZOLIN SODIUM 2 G: 2 SOLUTION INTRAVENOUS at 01:09

## 2023-09-16 RX ADMIN — POLYETHYLENE GLYCOL 3350 17 G: 17 POWDER, FOR SOLUTION ORAL at 09:09

## 2023-09-16 RX ADMIN — METHOCARBAMOL 750 MG: 750 TABLET ORAL at 07:09

## 2023-09-16 NOTE — NURSING
Nurses Note -- 4 Eyes      9/15/2023   8:10 PM      Skin assessed during: Admit      [x] No Altered Skin Integrity Present    []Prevention Measures Documented      [] Yes- Altered Skin Integrity Present or Discovered   [] LDA Added if Not in Epic (Describe Wound)   [] New Altered Skin Integrity was Present on Admit and Documented in LDA   [] Wound Image Taken    Wound Care Consulted? No    Attending Nurse:  Patrick Saab RN/Staff Member:  Chantelle Go RN

## 2023-09-16 NOTE — TRANSFER OF CARE
"Anesthesia Transfer of Care Note    Patient: Janeen Arrieta    Procedure(s) Performed: Procedure(s) (LRB):  APPLICATION, EXTERNAL FIXATION DEVICE (Bilateral)    Patient location: PACU    Anesthesia Type: general    Transport from OR: Transported from OR on room air with adequate spontaneous ventilation    Post pain: adequate analgesia    Post assessment: no apparent anesthetic complications and tolerated procedure well    Post vital signs: stable    Level of consciousness: awake, alert and responds to stimulation    Nausea/Vomiting: no nausea/vomiting    Complications: none    Transfer of care protocol was followed      Last vitals:   Visit Vitals  /66   Pulse 86   Temp 36.8 °C (98.2 °F)   Resp 20   Ht 5' 4" (1.626 m)   Wt 93.4 kg (206 lb)   SpO2 97%   BMI 35.36 kg/m²     "

## 2023-09-16 NOTE — PT/OT/SLP EVAL
Physical Therapy Evaluation    Patient Name:  Janeen Arrieta   MRN:  [id number]    Recommendations:     Discharge Recommendations: home   Discharge Equipment Recommendations: slide board (owns WC from  with BILL elevating leg rests,  to check if arm rests removable)   Barriers to discharge: Inaccessible home and Ongoing medical needs    Assessment:     Janeen Arrieta is a 47 y.o. female admitted with a medical diagnosis of fall from ladder resulting in BILL pilon fxs s/p external fixation. Further surgical fixation pending.  She presents with the following impairments/functional limitations: weakness, impaired endurance, impaired functional mobility, decreased lower extremity function, edema, orthopedic precautions . Pt mobilized well for bed mobility and slide board transfers to . Required overall min A but expect quick progression to independence with further training. At this time largest barriers to discharge are entrance to home due to 4 steps as well as possibility of further surgery. Discussed with  need for ramp in order for pt to safely enter/exit home. Pt would also benefit from OT evaluation for self care and ADLs.    Rehab Prognosis: Good; patient would benefit from acute skilled PT services to address these deficits and reach maximum level of function.    Recent Surgery: Procedure(s) (LRB):  APPLICATION, EXTERNAL FIXATION DEVICE (Bilateral) 1 Day Post-Op    Plan:     During this hospitalization, patient to be seen 6 x/week to address the identified rehab impairments via therapeutic activities, therapeutic exercises, wheelchair management/training and progress toward the following goals:    Plan of Care Expires:  10/16/23    Subjective     Chief Complaint: BILL numbness and paresthesia  Patient/Family Comments/goals: return home  Pain/Comfort:  Pain Rating 1: 0/10 (she reports numbness and tingling but denies pain)  Pain Addressed 1: Pre-medicate for activity  Pain Rating  Post-Intervention 1: 0/10    Patients cultural, spiritual, Gnosticism conflicts given the current situation: no    Living Environment:  Pt lives with  and 2 grandchildren (ages 4&2) in single level home with 4 steps to enter. She was Independent but not employed prior to accident. Pt has a tub shower.  Prior to admission, patients level of function was Independent.  Equipment used at home: none.  DME owned (not currently used): wheelchair(for  from ankle injury 2 years prior. Has elevated leg rests but unsure if arm rest removable as needed for safe slide board transfers)  Upon discharge, patient will have assistance from .    Objective:     Communicated with nurse prior to session.  Patient found HOB elevated with PureWick, peripheral IV, external fixator  upon PT entry to room.    General Precautions: Standard, fall  Orthopedic Precautions:RLE non weight bearing, LLE non weight bearing   Braces:    Respiratory Status: Room air  Blood Pressure: NT      Exams:  Cognitive Exam:  Patient is oriented to Person, Place, Time, and Situation  Skin Integrity/Edema:      -       BILL ankles wrapped with noted drainage L >R. Nurse aware  RUE Strength: WFL  LUE Strength: WFL  RLE ROM: WFL except ankle not assessed  RLE Strength: WFL except ankle not assessed  LLE ROM: WFL except ankle not assessed  LLE Strength: WFL except ankle not assessed        Functional Mobility:  Bed Mobility:     Scooting: minimum assistance  Supine to Sit: minimum assistance  Transfers:     Bed to Chair: minimum assistance with  slide board  using  Slide Board  Balance: static sitting independent      AM-PAC 6 CLICK MOBILITY  Total Score:12       Treatment & Education:  PT returned at 1247 to assist pt back to bed from . Noted pt in bed and resting.  states she was feeling nauseated and he assisted her back to bed. Educated on calling staff for assistance. Verbalized understanding.    Patient and spouse were provided with  verbal education and demonstrations education regarding PT POC, activity limitations, calling for assistance with transfers..  Understanding was verbalized, however additional teaching warranted.     Patient left up in chair with all lines intact, call button in reach, nurse notified,  present, and BILL Les elevated .    GOALS:   Multidisciplinary Problems       Physical Therapy Goals          Problem: Physical Therapy    Goal Priority Disciplines Outcome Goal Variances Interventions   Physical Therapy Goal     PT, PT/OT Ongoing, Progressing     Description: Goals to be met by: 10/16/23     Patient will increase functional independence with mobility by performin. Supine to sit with [place]  2. Bed to chair transfer mod Ind via slide board  3. Pt Independent with WC mobility using BILL UE propulsion x 300ft.                         History:     History reviewed. No pertinent past medical history.    History reviewed. No pertinent surgical history.    Time Tracking:     PT Received On: 23  PT Start Time: 1015     PT Stop Time: 1037  PT Total Time (min): 22 min     Billable Minutes: Evaluation moderate      2023

## 2023-09-16 NOTE — OP NOTE
09/15/2023    PRIMARY SURGEON: Felix Charles MD    ASSISTANT:  Maris Archibald NP was imperative to the critical parts of the surgical case.  This included the surgical approach and dissection, retraction, placement of hardware and implants, and closure.    PREOPERATIVE DIAGNOSIS:  Bilateral Pilon fractures    POSTOPERATIVE DIAGNOSIS:  Same    PROCEDURES:  External fixation of bilateral pilon fractures  Closed reduction of bilateral pilon fractures    ANESTHESIA:  General    BLOOD LOSS:  Less than 20 ml    DVT PROPHYLAXIS:  Lovenox per hospital protocol    INSTRUMENTATION:  Implant Name Type Inv. Item Serial No.  Lot No. LRB No. Used Action   BLUE BAR TO PIN CLAMP    [employer]ET  Bilateral 4 Implanted   BLUE PIN CLAMP    [employer]OMET  Bilateral 4 Implanted   GLASS FIBER BAR    [employer]ET  Bilateral 1 Implanted   YELLOW HALF PIN    [employer]ET   4 Implanted   BLACK TRANSFIXING PIN    [employer]OMET   2 Implanted       PROCEDURE IN DETAIL:    External fixation and closed treatment of bilateral comminuted distal tibial intra-articular fractures    Patient this patient brought in the room placed on table in supine position.  I initially started with the left lower extremity.  We prepped and draped the lower extremity in proper sterile fashion.    Initially started with fluoroscopy of the fracture and to find a point just proximal to the fracture to place my pins.  I then used fluoroscopy to place 2 pins from anterior to posterior in the midportion of the tibia.    I then turned my attention to the calcaneus.  I used fluoroscopy to guide a calcaneal pin from medial to lateral and make sure to stay parallel to the ankle joint.    I then attached the appropriate pins and bars in order to construct a delta frame of the ankle and distal tibia.  I then used fluoroscopy to get appropriate reduction of the fracture and then tightened the delta frame to hold the fracture in the proper alignment.    I then  performed the same exact above-noted procedure on the right lower extremity.    I then placed a sterile dressing around the external fixators.  The patient tolerated procedure well without any complications.    The goal is to admit the patient overnight with IV antibiotics and possibly discharge her in the morning.  She will come back for definitive fixation of her pilon fractures within the next 2-3 weeks by our orthopedic traumatologist.

## 2023-09-16 NOTE — ANESTHESIA POSTPROCEDURE EVALUATION
Anesthesia Post Evaluation    Patient: Janeen Arrieta    Procedure(s) Performed: Procedure(s) (LRB):  APPLICATION, EXTERNAL FIXATION DEVICE (Bilateral)    Final Anesthesia Type: general      Patient location during evaluation: PACU  Patient participation: Yes- Able to Participate  Level of consciousness: awake and alert  Post-procedure vital signs: reviewed and stable  Pain management: adequate  Airway patency: patent  LOTTIE mitigation strategies: Multimodal analgesia  PONV status at discharge: No PONV  Anesthetic complications: no      Cardiovascular status: hemodynamically stable and blood pressure returned to baseline  Respiratory status: unassisted  Hydration status: euvolemic  Follow-up not needed.          Vitals Value Taken Time   /80 09/15/23 1951   Temp 36.8 °C (98.2 °F) 09/15/23 1922   Pulse 68 09/15/23 1952   Resp 20 09/15/23 1952   SpO2 100 % 09/15/23 1952   Vitals shown include unvalidated device data.      Event Time   Out of Recovery 19:55:00         Pain/Jamal Score: Pain Rating Prior to Med Admin: 7 (9/15/2023  7:45 PM)  Jamal Score: 9 (9/15/2023  7:55 PM)

## 2023-09-16 NOTE — PLAN OF CARE
Problem: Physical Therapy  Goal: Physical Therapy Goal  Description: Goals to be met by: 10/16/23     Patient will increase functional independence with mobility by performin. Supine to sit with Hydes  2. Bed to chair transfer mod Ind via slide board  3. Pt Independent with WC mobility using BILL UE propulsion x 300ft.    Outcome: Ongoing, Progressing

## 2023-09-16 NOTE — PROGRESS NOTES
"[facility]  Orth[facility] Surgery  Progress Note:      Patient Name: Janeen Arrieta  MRN: [id number]  Admission Date: 9/15/2023  Attending Provider:  Felix Charles MD  Hospital Length of Stay:  1 days  Postoperative Day: 1 Day Post-Op      SUBJECTIVE:  Postop day one status post closed reduction of bilateral pilon fractures with application of external fixation. Patient reports no acute issues overnight.  Some issues with pain control.  Continues to endorse intermittent paresthesias bilaterally.  Resting comfortably in bed this morning.  Patient has not worked with physical therapy as of yet.        OBJECTIVE:    Physical Examination:  General: Well-developed, well-nourished.  Neuro: Alert and oriented x 3.  Psych: Normal mood and affect.  RLE exam:  External fixator in place.  Dressing clean, dry, intact.  Positive swelling, compartments soft and compressible.  Able to flex and extend digits.  NVI distally with 2+ DP pulse and CR <3 seconds.  Sensation intact to light touch, endorses intermittent paresthesias.  Paresthesias unchanged from preop.  LLE exam:  External fixator in place.  Dressing with some serosanguineous drainage from the lateral calcaneus pin site, controlled.  Positive swelling, compartments soft and compressible.  Able to flex and extend digits.  NVI distally with 2+ DP pulse and CR <3 seconds.  Sensation intact to light touch, endorses intermittent paresthesias.  Paresthesias unchanged from preop.      Vitals:    09/16/23 0705 09/16/23 0707 09/16/23 0715 09/16/23 0735   BP: 119/78      BP Location:       Patient Position:       Pulse: 66      Resp: 18  18    Temp:  97.6 °F (36.4 °C)     TempSrc:  Oral     SpO2: 98%      Weight:    93.4 kg (206 lb)   Height:    5' 4.02" (1.626 m)     I/O last 3 completed shifts:  In: 640 [P.O.:240; IV Piggyback:400]  Out: 2170 [Urine:2150; Blood:20]    Medications:  Scheduled Meds:   aspirin  81 mg Oral BID    [START ON 9/18/2023] bisacodyL  " 10 mg Rectal Daily    ceFAZolin (ANCEF) IVPB  2 g Intravenous Q6H    docusate sodium  200 mg Oral Daily    famotidine  20 mg Oral BID    gabapentin  300 mg Oral QHS    oxyCODONE-acetaminophen  1 tablet Oral Once    polyethylene glycol  17 g Oral QHS    senna-docusate 8.6-50 mg  2 tablet Oral BID     Continuous Infusions:   sodium chloride 0.9% 75 mL/hr at 09/15/23 2030     PRN Meds:.acetaminophen, aluminum-magnesium hydroxide-simethicone, HYDROmorphone, lactulose, melatonin, methocarbamoL, morphine, ondansetron, ondansetron, oxyCODONE-acetaminophen, oxyCODONE-acetaminophen, sodium chloride 0.9%    Significant Labs: All pertinent labs within the past 24 hours have been reviewed.  Recent Lab Results         09/16/23  0454   09/15/23  1704   09/15/23  1514   09/15/23  1503        Albumin/Globulin Ratio 1.3     1.3         ABO and RH       A POS       Albumin 3.9     4.3         Alcohol, Serum     <10.0         Alkaline Phosphatase 96     98         ALT 14     16         aPTT     23.4  Comment: For Minimal Heparin Infusion, the goal aPTT 64-85 seconds corresponds to an anti-Xa of 0.3-0.5.    For Low Intensity and High Intensity Heparin, the goal aPTT  seconds corresponds to an anti-Xa of 0.3-0.7         AST 16     16         Baso # 0.02     0.04         Basophil % 0.2     0.3         BILIRUBIN TOTAL 0.8     0.9         BUN 8.4     11.6         Calcium 9.0     9.7         Chloride 106     106         CO2 23     22         Creatinine 0.81     0.94         eGFR >60     >60         Eos # 0.00     0.24         Eosinophil % 0.0     2.0         Globulin, Total 3.1     3.4         Glucose 128     127         Group & Rh     A POS         Hematocrit 37.1     40.2         Hemoglobin 12.0     13.3         Immature Grans (Abs) 0.03     0.06         Immature Granulocytes 0.3     0.5         Indirect Melva GEL     NEG         INR     1.0         Lactate, Frankie   0.9   2.5         Lymph # 0.83     2.13         LYMPH % 7.5      17.7         MCH 27.5     28.2         MCHC 32.3     33.1         MCV 85.1     85.2         Mono # 0.48     0.77         Mono % 4.3     6.4         MPV 10.5     10.0         Neut # 9.71     8.81         Neut % 87.7     73.1         nRBC 0.0     0.0         Platelets 258     275         Potassium 4.7     3.7         PROTEIN TOTAL 7.0     7.7         Protime     12.8         RBC 4.36     4.72         RDW 13.7     13.7         Sodium 139     139         Specimen Outdate     09/18/2023 23:59         WBC 11.07     12.05                 Significant Imaging:  I have reviewed all pertinent imaging results/findings.  CT Ankle (Including Hindfoot) Without Contrast Bilateral    Result Date: 9/16/2023  EXAMINATION CT ANKLE (INCLUDING HINDFOOT) WITHOUT CONTRAST BILATERAL CLINICAL HISTORY Ankle trauma, fracture, xray done (Age >= 5y);surgical planning; TECHNIQUE Non-contrast helical-acquisition CT images of the ankles were obtained.  Multiplanar reconstructions accomplished by a CT technologist at a separate workstation, pushed to PACS for physician review. COMPARISON 15 September 2023 FINDINGS Images were reviewed in bone and soft tissue windows. Exam quality: adequate for evaluation Interval placement of external fixation device is noted bilaterally.  Overall appearance of bilateral ankle fractures consistent with improved alignment, with no new or worsened focal osseous irregularity identified.  No new joint incongruity is appreciated.  Regional soft tissues are without significant interval change. IMPRESSION Bilateral external fixation of the ankles with improved fracture alignment. RADIATION DOSE Automated tube current modulation, weight-based exposure dosing, and/or iterative reconstruction technique utilized to reach lowest reasonably achievable exposure rate. DLP: 146 mGy*cm Electronically signed by: Jase Arriaga Date:    09/16/2023 Time:    09:19    X-Ray Ankle 2 View Left    Result Date: 9/15/2023  EXAMINATION: Left  ankle. CLINICAL HISTORY: Postop. TECHNIQUE: Two-view COMPARISON: September 15, 2023 FINDINGS: Interval placement of the traction device. Known severely comminuted fractures of the distal tibia.     As above. Electronically signed by: Dale Sandhu Date:    09/15/2023 Time:    20:10    X-Ray Ankle 2 View Right    Result Date: 9/15/2023  EXAMINATION: XR ANKLE 2 VIEW RIGHT CLINICAL HISTORY: postop; TECHNIQUE: Two views COMPARISON: September 15, 2023 FINDINGS: Interval placement of a traction device.  Known severe fractures of the distal tibia with improved displacement.     As above. Electronically signed by: Dale Sandhu Date:    09/15/2023 Time:    20:09    SURG FL Surgery Fluoro Usage    Result Date: 9/15/2023  See OP Notes for results. IMPRESSION: See OP Notes for results. This procedure was auto-finalized by: Virtual Radiologist    X-Ray Tibia Fibula 2 View Right    Result Date: 9/15/2023  EXAMINATION: XR TIBIA FIBULA 2 VIEW RIGHT CLINICAL HISTORY: Injury, unspecified, initial encounter TECHNIQUE: AP and lateral views of the right tibia and fibula were performed. COMPARISON: None. FINDINGS: There is a comminuted fracture of the distal tibia.  It is displaced.  No dislocation is seen.  The fibula is intact.  There is diffuse soft tissue swelling in the ankle.     Comminuted fracture distal tibia Electronically signed by: Jarett Montesinos Date:    09/15/2023 Time:    17:15    X-Ray Tibia Fibula 2 View Left    Result Date: 9/15/2023  EXAMINATION: XR TIBIA FIBULA 2 VIEW LEFT CLINICAL HISTORY: Injury, unspecified, initial encounter TECHNIQUE: AP and lateral views of the left tibia and fibula were performed. COMPARISON: None. FINDINGS: There is a comminuted fracture of the distal tibia.  There is displacement seen.  There is diffuse soft tissue swelling in the ankle.     Comminuted fracture of the distal tibia with displacement Electronically signed by: Jarett Montesinos Date:    09/15/2023 Time:    17:10    X-Ray  Forearm Right    Result Date: 9/15/2023  EXAMINATION: XR FOREARM RIGHT CLINICAL HISTORY: Pain in arm, unspecified TECHNIQUE: AP and lateral views of the right forearm COMPARISON: None FINDINGS: No acute fracture identified.  Joint alignments are maintained.  Small olecranon enthesophyte.  Few small areas of retained debris along the skin surface of the upper forearm.     No acute osseous process appreciated. Electronically signed by: Claudy Mcgill Date:    09/15/2023 Time:    16:22    CT Ankle (Including Hindfoot) Without Contrast Bilateral    Result Date: 9/15/2023  EXAMINATION: CT ANKLE (INCLUDING HINDFOOT) WITHOUT CONTRAST BILATERAL CLINICAL HISTORY: Fracture, ankle; TECHNIQUE: Noncontrast CT imaging of both ankles.  Axial, coronal and sagittal reformatted images reviewed.   Dose length product is 105 mGycm. Automatic exposure control, adjustment of mA/kV or iterative reconstruction technique used to limit radiation dose. COMPARISON: Radiographs earlier today FINDINGS: On the right, there is a comminuted fracture of the distal tibia with displacement of to 7 mm.  Largest fracture fragment is laterally angulated.  Minimally displaced transverse fracture through the medial malleolus.  Few tiny chip fractures near the tip of the distal fibula. On the left, there is also a comminuted tibial fracture with extension into the medial malleolus.  On this side, fragments are displaced by up to about 2 cm.  Several small osseous fragments lie between the distal fibula and talus.     Comminuted bilateral tibial fractures.  Several tiny chip fractures arising from the distal right fibula.  Several additional tiny fracture fragments positioned between the distal left fibula and talus are of unclear origin. Electronically signed by: Claudy Mcgill Date:    09/15/2023 Time:    16:16    CT Chest Abdomen Pelvis With Contrast (xpd)    Result Date: 9/15/2023  EXAMINATION: CT CHEST ABDOMEN PELVIS WITH CONTRAST (XPD) CLINICAL HISTORY:  Trauma; TECHNIQUE: Low dose axial images, sagittal and coronal reformations were obtained from the thoracic inlet to the pubic symphysis following the IV contrast administration. Automatic exposure control is utilized to reduce patient radiation exposure. COMPARISON: None FINDINGS: The lungs are adequately aerated.  No pneumothorax is seen.  No pulmonary contusion is seen.  No pleural effusion is seen.  No infiltrate is seen. The thoracic aorta is normal in caliber.  No dissection or aneurysm is seen.  No retrosternal hematoma is seen. The abdominal aorta appears grossly unremarkable.  No dissection or posttraumatic changes are seen. The heart appears normal. The liver appears normal.  No liver mass or lesion is seen.  No evidence of liver laceration is seen. The gallbladder appears normal.  No gallstones are seen.. The spleen appears normal.  No splenic laceration is seen.  The pancreas appears grossly unremarkable.  No pancreatic mass or lesion is seen.  No inflammation is seen. No adrenal abnormality is seen.  No adrenal nodule is seen. The kidneys are well perfused.  No hydronephrosis is seen.  No hydroureter is seen.  No retroperitoneal hematoma is seen. Visualized portions of the bowel shows no acute abnormality.  No colitis is seen.  No diverticulitis is seen.  No colonic mass is seen. No free air is seen.  No free fluid is seen. Urinary bladder appears unremarkable.  The uterus is slightly heterogeneous and there findings consistent with uterine fibroids. No sternal fracture is seen.  No thoracic spine fracture is seen.  No lumbar spine fracture is seen.  No pelvic fracture is seen.  No rib fractures are seen.     No evidence of acute trauma Electronically signed by: Jarett Montesinos Date:    09/15/2023 Time:    16:13    CT Head Without Contrast    Result Date: 9/15/2023  EXAMINATION CT HEAD WITHOUT CONTRAST CLINICAL HISTORY Trauma; TECHNIQUE Axial non-contrast CT images of the head were acquired and  multiplanar reconstructions accomplished by a CT technologist at a separate workstation, pushed to PACS for physician review. COMPARISON None available at the time of the initial interpretation. FINDINGS Images were reviewed in subdural, brain, soft tissue, and bone windows. Exam quality: Motion/streak artifact limits assessment of the posterior fossa. Hemorrhage: No evidence of acute hyperattenuating blood products. Parenchyma: No discrete mass, localized mass-effect, or CT evidence of an acute territorial cortical-based ischemic insult. Gray-white differentiation is preserved. Midline shift: None. CSF spaces: Normal ventricle size and configuration. No extra-axial masses or expansile fluid collections. Vasculature: No hyperdense artery identified. No abnormal densities within the dural sinuses. Other findings: No abnormalities of the scalp or subjacent osseous structures. Mastoids are well aerated. No focal abnormality of the sella. The included facial structures are unremarkable. IMPRESSION No CT-evident acute intracranial abnormality. RADIATION DOSE Automated tube current modulation, weight-based exposure dosing, and/or iterative reconstruction technique utilized to reach lowest reasonably achievable exposure rate. DLP: 1380 mGy*cm Electronically signed by: Jase Arriaga Date:    09/15/2023 Time:    16:07    CT Cervical Spine Without Contrast    Result Date: 9/15/2023  EXAMINATION: CT CERVICAL SPINE WITHOUT CONTRAST CLINICAL HISTORY: Trauma; TECHNIQUE: Low dose axial images, sagittal and coronal reformations were performed though the cervical spine.  Contrast was not administered. Automatic exposure control is utilized to reduce patient radiation exposure. COMPARISON: None FINDINGS: The vertebral body heights are well maintained. There is some loss of the normal lordotic curve cervical spine most likely related to spasm. No fracture is seen. No dislocation is seen. The odontoid and lateral masses appear grossly  unremarkable.  There are some degenerative changes seen in the cervical spine which appear to be chronic.     Loss of the normal lordotic curve of the cervical spine most likely related to spasm but otherwise unremarkable with no evidence of acute fracture or dislocation seen Incidental note is made of some degenerative changes in the  cervical spine Electronically signed by: Jarett Montesinos Date:    09/15/2023 Time:    16:06    X-Ray Ankle Complete Left    Result Date: 9/15/2023  EXAMINATION: XR ANKLE COMPLETE 3 VIEW LEFT CLINICAL HISTORY: Pain in unspecified ankle and joints of unspecified foot TECHNIQUE: AP, lateral and oblique views of the left ankle COMPARISON: None FINDINGS: Comminuted fracture of the tibial plafond with mild overall displacement.     Comminuted tibial plafond fracture. Electronically signed by: Claudy Mcgill Date:    09/15/2023 Time:    15:53    X-Ray Ankle Complete Right    Result Date: 9/15/2023  EXAMINATION: XR ANKLE COMPLETE 3 VIEW RIGHT CLINICAL HISTORY: Pain in unspecified ankle and joints of unspecified foot TECHNIQUE: AP, lateral, and oblique images of the right ankle COMPARISON: None FINDINGS: Oblique fracture through the distal tibia with anterior displacement and lateral angulation.  Mildly displaced fracture of the medial malleolus.  No definitive fibular fracture.     Fractures of the distal tibia and medial malleolus. Electronically signed by: Claudy Mcgill Date:    09/15/2023 Time:    15:51    X-Ray Pelvis Routine AP    Result Date: 9/15/2023  EXAMINATION XR PELVIS ROUTINE AP CLINICAL HISTORY r/o bleeding or hemorrhage; TECHNIQUE A total of 1 image(s) submitted of the pelvis. COMPARISON None available at the time of initial interpretation. FINDINGS No displaced fracture or dislocation is identified. The visualized pelvic ring structures and articular spaces are preserved. No evidence of a large joint effusion is appreciated. No aggressive osseous lesion or periosteal  reaction is appreciated. The trabecular pattern is unremarkable. The included soft tissues are without acute abnormality. IMPRESSION No convincing acute abnormality. Electronically signed by: Jase Arriaga Date:    09/15/2023 Time:    15:32    X-Ray Chest 1 View    Result Date: 9/15/2023  EXAMINATION XR CHEST 1 VIEW CLINICAL HISTORY r/o bleeding or hemorrhage; TECHNIQUE A total of 1 frontal image(s) of the chest. COMPARISON None available at the time of initial interpretation. FINDINGS Lines/tubes/devices: ECG leads overlie the imaged region. The cardiomediastinal silhouette and central pulmonary vasculature are unremarkable for utilized technique.  The trachea is midline. There is no lobar consolidation, pleural effusion, or pneumothorax. There is no acute osseous or extrathoracic abnormality. IMPRESSION No convincing acute radiographic abnormality. Electronically signed by: Jase Arriaga Date:    09/15/2023 Time:    15:32    CT Ankle (Including Hindfoot) Without Contrast Bilateral  EXAMINATION  CT ANKLE (INCLUDING HINDFOOT) WITHOUT CONTRAST BILATERAL    CLINICAL HISTORY  Ankle trauma, fracture, xray done (Age >= 5y);surgical planning;    TECHNIQUE  Non-contrast helical-acquisition CT images of the ankles were obtained.  Multiplanar reconstructions accomplished by a CT technologist at a separate workstation, pushed to PACS for physician review.    COMPARISON  15 September 2023    FINDINGS  Images were reviewed in bone and soft tissue windows.    Exam quality: adequate for evaluation    Interval placement of external fixation device is noted bilaterally.  Overall appearance of bilateral ankle fractures consistent with improved alignment, with no new or worsened focal osseous irregularity identified.  No new joint incongruity is appreciated.  Regional soft tissues are without significant interval change.    IMPRESSION  Bilateral external fixation of the ankles with improved fracture alignment.    RADIATION  DOSE  Automated tube current modulation, weight-based exposure dosing, and/or iterative reconstruction technique utilized to reach lowest reasonably achievable exposure rate.    DLP: 146 mGy*cm    Electronically signed by: Jase Arriaga  Date:    09/16/2023  Time:    09:19         ASSESSMENT/PLAN: 47 y.o. female 1 Day Post-Op s/p bilateral closed reduction of pilon fractures with application of external fixation  -Plan to transition from Percocet 5 mg/325 mg to Percocet 7.5 mg/325 mg to enhance pain control.  -Physical therapy to evaluate for transfers to and from wheelchair.  -Non weight-bearing to the bilateral lower extremities.  -Continue with established DVT prophylaxis.  -Daily pin site care starting tomorrow with xeroform, Kerlix, and ACE wrap.  Reinforce pin site dressings PRN.  -Disposition pending, possible discharge home today pending physical therapy evaluation.  -Patient will require definitive fixation of the bilateral ankle fractures by one of the orthopedic traumatologists in the future after adequate tissue healing has occurred.    The above findings, diagnostics, and treatment plan were discussed with Felix Charles MD who is in agreement with the plan of care except as stated in additional documentation.    DREAD Romero-C  Orthopedic Surgery  [facility] - Ortho Neuro    This note was created with the assistance of voice recognition software or phone dictation.  There may be transcription errors as a result of using this technology however minimal. Effort has been made to assure accuracy of transcription but any obvious errors or omissions should be clarified with the author of the document.

## 2023-09-17 ENCOUNTER — ANESTHESIA EVENT (OUTPATIENT)
Dept: SURGERY | Facility: HOSPITAL | Age: 48
DRG: 493 | End: 2023-09-17
Payer: MEDICAID

## 2023-09-17 ENCOUNTER — ANESTHESIA (OUTPATIENT)
Dept: SURGERY | Facility: HOSPITAL | Age: 48
DRG: 493 | End: 2023-09-17
Payer: MEDICAID

## 2023-09-17 PROBLEM — S82.871A CLOSED DISPLACED PILON FRACTURE OF RIGHT TIBIA: Status: ACTIVE | Noted: 2023-09-17

## 2023-09-17 PROBLEM — S82.872A CLOSED DISPLACED PILON FRACTURE OF LEFT TIBIA: Status: ACTIVE | Noted: 2023-09-17

## 2023-09-17 LAB
ANION GAP SERPL CALC-SCNC: 6 MEQ/L
BUN SERPL-MCNC: 12.7 MG/DL (ref 7–18.7)
CALCIUM SERPL-MCNC: 8.4 MG/DL (ref 8.4–10.2)
CHLORIDE SERPL-SCNC: 109 MMOL/L (ref 98–107)
CO2 SERPL-SCNC: 27 MMOL/L (ref 22–29)
CREAT SERPL-MCNC: 0.8 MG/DL (ref 0.55–1.02)
CREAT/UREA NIT SERPL: 16
ERYTHROCYTE [DISTWIDTH] IN BLOOD BY AUTOMATED COUNT: 14.4 % (ref 11.5–17)
GFR SERPLBLD CREATININE-BSD FMLA CKD-EPI: >60 MLS/MIN/1.73/M2
GLUCOSE SERPL-MCNC: 95 MG/DL (ref 74–100)
HCT VFR BLD AUTO: 32.6 % (ref 37–47)
HGB BLD-MCNC: 10.8 G/DL (ref 12–16)
MCH RBC QN AUTO: 28.6 PG (ref 27–31)
MCHC RBC AUTO-ENTMCNC: 33.1 G/DL (ref 33–36)
MCV RBC AUTO: 86.2 FL (ref 80–94)
NRBC BLD AUTO-RTO: 0 %
PLATELET # BLD AUTO: 222 X10(3)/MCL (ref 130–400)
PMV BLD AUTO: 10.5 FL (ref 7.4–10.4)
POTASSIUM SERPL-SCNC: 4 MMOL/L (ref 3.5–5.1)
RBC # BLD AUTO: 3.78 X10(6)/MCL (ref 4.2–5.4)
SODIUM SERPL-SCNC: 142 MMOL/L (ref 136–145)
WBC # SPEC AUTO: 8.52 X10(3)/MCL (ref 4.5–11.5)

## 2023-09-17 PROCEDURE — 27828 PR OPEN TREATMENT FRACTURE DISTAL TIBIA & FIBULA: ICD-10-PCS | Mod: 58,RT,, | Performed by: ORTHOPAEDIC SURGERY

## 2023-09-17 PROCEDURE — 25000003 PHARM REV CODE 250: Performed by: PHYSICIAN ASSISTANT

## 2023-09-17 PROCEDURE — 20694 RMVL EXT FIXJ SYS UNDER ANES: CPT | Mod: 58,51,, | Performed by: ORTHOPAEDIC SURGERY

## 2023-09-17 PROCEDURE — 64447 NJX AA&/STRD FEMORAL NRV IMG: CPT | Performed by: ANESTHESIOLOGY

## 2023-09-17 PROCEDURE — 71000033 HC RECOVERY, INTIAL HOUR: Performed by: ORTHOPAEDIC SURGERY

## 2023-09-17 PROCEDURE — 20694 PR REMOVE EXTERN BONE FIX DEV W ANESTH: ICD-10-PCS | Mod: 58,51,, | Performed by: ORTHOPAEDIC SURGERY

## 2023-09-17 PROCEDURE — 27201423 OPTIME MED/SURG SUP & DEVICES STERILE SUPPLY: Performed by: ORTHOPAEDIC SURGERY

## 2023-09-17 PROCEDURE — 76942 ECHO GUIDE FOR BIOPSY: CPT | Mod: 26,,, | Performed by: ANESTHESIOLOGY

## 2023-09-17 PROCEDURE — 25000003 PHARM REV CODE 250: Performed by: STUDENT IN AN ORGANIZED HEALTH CARE EDUCATION/TRAINING PROGRAM

## 2023-09-17 PROCEDURE — 21400001 HC TELEMETRY ROOM

## 2023-09-17 PROCEDURE — 64445 NJX AA&/STRD SCIATIC NRV IMG: CPT | Performed by: ANESTHESIOLOGY

## 2023-09-17 PROCEDURE — 25000003 PHARM REV CODE 250

## 2023-09-17 PROCEDURE — 64450 NJX AA&/STRD OTHER PN/BRANCH: CPT | Mod: 59,RT,, | Performed by: ANESTHESIOLOGY

## 2023-09-17 PROCEDURE — 64450 PR NERVE BLOCK INJ, ANES/STEROID, OTHER PERIPHERAL: ICD-10-PCS | Mod: 59,RT,, | Performed by: ANESTHESIOLOGY

## 2023-09-17 PROCEDURE — 63600175 PHARM REV CODE 636 W HCPCS: Performed by: ORTHOPAEDIC SURGERY

## 2023-09-17 PROCEDURE — 27828 TREAT LOWER LEG FRACTURE: CPT | Mod: AS,58,RT,

## 2023-09-17 PROCEDURE — 64447 PERIPHERAL BLOCK: ICD-10-PCS | Mod: 59,LT,, | Performed by: ANESTHESIOLOGY

## 2023-09-17 PROCEDURE — 20692 APPL MLTPLN UNI EXT FIXJ SYS: CPT | Mod: 58,51,, | Performed by: ORTHOPAEDIC SURGERY

## 2023-09-17 PROCEDURE — 63600175 PHARM REV CODE 636 W HCPCS: Performed by: ANESTHESIOLOGY

## 2023-09-17 PROCEDURE — 63600175 PHARM REV CODE 636 W HCPCS

## 2023-09-17 PROCEDURE — C1713 ANCHOR/SCREW BN/BN,TIS/BN: HCPCS | Performed by: ORTHOPAEDIC SURGERY

## 2023-09-17 PROCEDURE — 20692 PR APPLY BONE MULTIPLAN,EXT FIX DEV: ICD-10-PCS | Mod: 58,51,, | Performed by: ORTHOPAEDIC SURGERY

## 2023-09-17 PROCEDURE — D9220A PRA ANESTHESIA: ICD-10-PCS | Mod: ANES,,, | Performed by: ANESTHESIOLOGY

## 2023-09-17 PROCEDURE — 27828 PR OPEN TREATMENT FRACTURE DISTAL TIBIA & FIBULA: ICD-10-PCS | Mod: AS,58,RT,

## 2023-09-17 PROCEDURE — D9220A PRA ANESTHESIA: ICD-10-PCS | Mod: CRNA,,, | Performed by: STUDENT IN AN ORGANIZED HEALTH CARE EDUCATION/TRAINING PROGRAM

## 2023-09-17 PROCEDURE — 37000008 HC ANESTHESIA 1ST 15 MINUTES: Performed by: ORTHOPAEDIC SURGERY

## 2023-09-17 PROCEDURE — 63600175 PHARM REV CODE 636 W HCPCS: Performed by: STUDENT IN AN ORGANIZED HEALTH CARE EDUCATION/TRAINING PROGRAM

## 2023-09-17 PROCEDURE — 37000009 HC ANESTHESIA EA ADD 15 MINS: Performed by: ORTHOPAEDIC SURGERY

## 2023-09-17 PROCEDURE — 36000711: Performed by: ORTHOPAEDIC SURGERY

## 2023-09-17 PROCEDURE — 76942 PR U/S GUIDANCE FOR NEEDLE GUIDANCE: ICD-10-PCS | Mod: 26,,, | Performed by: ANESTHESIOLOGY

## 2023-09-17 PROCEDURE — 36000710: Performed by: ORTHOPAEDIC SURGERY

## 2023-09-17 PROCEDURE — D9220A PRA ANESTHESIA: Mod: ANES,,, | Performed by: ANESTHESIOLOGY

## 2023-09-17 PROCEDURE — 80048 BASIC METABOLIC PNL TOTAL CA: CPT

## 2023-09-17 PROCEDURE — 27828 TREAT LOWER LEG FRACTURE: CPT | Mod: 58,RT,, | Performed by: ORTHOPAEDIC SURGERY

## 2023-09-17 PROCEDURE — D9220A PRA ANESTHESIA: Mod: CRNA,,, | Performed by: STUDENT IN AN ORGANIZED HEALTH CARE EDUCATION/TRAINING PROGRAM

## 2023-09-17 PROCEDURE — 85027 COMPLETE CBC AUTOMATED: CPT

## 2023-09-17 DEVICE — ROD CARBON FIBER 11MM X 400MM: Type: IMPLANTABLE DEVICE | Site: ANKLE | Status: FUNCTIONAL

## 2023-09-17 DEVICE — SCREW LOCKING BONE 2.7X24MM: Type: IMPLANTABLE DEVICE | Site: ANKLE | Status: FUNCTIONAL

## 2023-09-17 DEVICE — POST FIXATOR EXTERAL 11MM SS: Type: IMPLANTABLE DEVICE | Site: ANKLE | Status: FUNCTIONAL

## 2023-09-17 DEVICE — CLAMP COMBINATION / LG EXT FIX: Type: IMPLANTABLE DEVICE | Site: ANKLE | Status: FUNCTIONAL

## 2023-09-17 DEVICE — SCREW SCHANZ 5/60/150 294.784: Type: IMPLANTABLE DEVICE | Site: ANKLE | Status: FUNCTIONAL

## 2023-09-17 DEVICE — CLAMP EXT FIX COMBO MDM 8-11MM: Type: IMPLANTABLE DEVICE | Site: ANKLE | Status: FUNCTIONAL

## 2023-09-17 DEVICE — IMPLANTABLE DEVICE: Type: IMPLANTABLE DEVICE | Site: ANKLE | Status: FUNCTIONAL

## 2023-09-17 DEVICE — CLAMP LG 6 POSITION MULTI-PIN: Type: IMPLANTABLE DEVICE | Site: ANKLE | Status: FUNCTIONAL

## 2023-09-17 DEVICE — SCREW SCHANZ 4.0 X125: Type: IMPLANTABLE DEVICE | Site: ANKLE | Status: FUNCTIONAL

## 2023-09-17 DEVICE — SCREW BONE LOCK VA 2.7X30MM: Type: IMPLANTABLE DEVICE | Site: ANKLE | Status: FUNCTIONAL

## 2023-09-17 RX ORDER — OXYCODONE AND ACETAMINOPHEN 10; 325 MG/1; MG/1
1 TABLET ORAL EVERY 4 HOURS PRN
Status: DISCONTINUED | OUTPATIENT
Start: 2023-09-17 | End: 2023-09-19

## 2023-09-17 RX ORDER — CEFAZOLIN SODIUM 2 G/50ML
2 SOLUTION INTRAVENOUS
Status: COMPLETED | OUTPATIENT
Start: 2023-09-17 | End: 2023-09-18

## 2023-09-17 RX ORDER — ROPIVACAINE HYDROCHLORIDE 5 MG/ML
INJECTION, SOLUTION EPIDURAL; INFILTRATION; PERINEURAL
Status: COMPLETED
Start: 2023-09-17 | End: 2023-09-17

## 2023-09-17 RX ORDER — EPHEDRINE SULFATE 50 MG/ML
INJECTION, SOLUTION INTRAVENOUS
Status: DISCONTINUED | OUTPATIENT
Start: 2023-09-17 | End: 2023-09-17

## 2023-09-17 RX ORDER — LIDOCAINE HYDROCHLORIDE 20 MG/ML
INJECTION INTRAVENOUS
Status: DISCONTINUED | OUTPATIENT
Start: 2023-09-17 | End: 2023-09-17

## 2023-09-17 RX ORDER — FENTANYL CITRATE 50 UG/ML
INJECTION, SOLUTION INTRAMUSCULAR; INTRAVENOUS
Status: DISCONTINUED | OUTPATIENT
Start: 2023-09-17 | End: 2023-09-17

## 2023-09-17 RX ORDER — DIPHENHYDRAMINE HYDROCHLORIDE 50 MG/ML
25 INJECTION INTRAMUSCULAR; INTRAVENOUS ONCE
Status: DISCONTINUED | OUTPATIENT
Start: 2023-09-17 | End: 2023-09-17 | Stop reason: HOSPADM

## 2023-09-17 RX ORDER — ONDANSETRON 2 MG/ML
INJECTION INTRAMUSCULAR; INTRAVENOUS
Status: DISCONTINUED | OUTPATIENT
Start: 2023-09-17 | End: 2023-09-17

## 2023-09-17 RX ORDER — ROPIVACAINE HYDROCHLORIDE 5 MG/ML
INJECTION, SOLUTION EPIDURAL; INFILTRATION; PERINEURAL
Status: COMPLETED | OUTPATIENT
Start: 2023-09-17 | End: 2023-09-17

## 2023-09-17 RX ORDER — DEXAMETHASONE SODIUM PHOSPHATE 4 MG/ML
INJECTION, SOLUTION INTRA-ARTICULAR; INTRALESIONAL; INTRAMUSCULAR; INTRAVENOUS; SOFT TISSUE
Status: DISCONTINUED | OUTPATIENT
Start: 2023-09-17 | End: 2023-09-17

## 2023-09-17 RX ORDER — MIDAZOLAM HYDROCHLORIDE 1 MG/ML
INJECTION INTRAMUSCULAR; INTRAVENOUS
Status: DISPENSED
Start: 2023-09-17 | End: 2023-09-17

## 2023-09-17 RX ORDER — MIDAZOLAM HYDROCHLORIDE 1 MG/ML
2 INJECTION INTRAMUSCULAR; INTRAVENOUS ONCE AS NEEDED
Status: COMPLETED | OUTPATIENT
Start: 2023-09-17 | End: 2023-09-17

## 2023-09-17 RX ORDER — SODIUM CHLORIDE, SODIUM GLUCONATE, SODIUM ACETATE, POTASSIUM CHLORIDE AND MAGNESIUM CHLORIDE 30; 37; 368; 526; 502 MG/100ML; MG/100ML; MG/100ML; MG/100ML; MG/100ML
INJECTION, SOLUTION INTRAVENOUS CONTINUOUS
Status: DISCONTINUED | OUTPATIENT
Start: 2023-09-17 | End: 2023-09-28 | Stop reason: HOSPADM

## 2023-09-17 RX ORDER — ROCURONIUM BROMIDE 10 MG/ML
INJECTION, SOLUTION INTRAVENOUS
Status: DISCONTINUED | OUTPATIENT
Start: 2023-09-17 | End: 2023-09-17

## 2023-09-17 RX ORDER — PHENYLEPHRINE HCL IN 0.9% NACL 1 MG/10 ML
SYRINGE (ML) INTRAVENOUS
Status: DISCONTINUED | OUTPATIENT
Start: 2023-09-17 | End: 2023-09-17

## 2023-09-17 RX ORDER — LIDOCAINE HYDROCHLORIDE 10 MG/ML
1 INJECTION, SOLUTION EPIDURAL; INFILTRATION; INTRACAUDAL; PERINEURAL ONCE
Status: DISCONTINUED | OUTPATIENT
Start: 2023-09-17 | End: 2023-09-17 | Stop reason: HOSPADM

## 2023-09-17 RX ORDER — SODIUM CHLORIDE, SODIUM LACTATE, POTASSIUM CHLORIDE, CALCIUM CHLORIDE 600; 310; 30; 20 MG/100ML; MG/100ML; MG/100ML; MG/100ML
INJECTION, SOLUTION INTRAVENOUS CONTINUOUS
Status: DISCONTINUED | OUTPATIENT
Start: 2023-09-17 | End: 2023-09-28 | Stop reason: HOSPADM

## 2023-09-17 RX ORDER — ONDANSETRON 4 MG/1
4 TABLET, ORALLY DISINTEGRATING ORAL ONCE
Status: DISCONTINUED | OUTPATIENT
Start: 2023-09-17 | End: 2023-09-17 | Stop reason: HOSPADM

## 2023-09-17 RX ORDER — PROPOFOL 10 MG/ML
VIAL (ML) INTRAVENOUS
Status: DISCONTINUED | OUTPATIENT
Start: 2023-09-17 | End: 2023-09-17

## 2023-09-17 RX ORDER — CEFAZOLIN SODIUM 2 G/50ML
SOLUTION INTRAVENOUS
Status: DISPENSED
Start: 2023-09-17 | End: 2023-09-17

## 2023-09-17 RX ORDER — VANCOMYCIN HYDROCHLORIDE 1 G/20ML
INJECTION, POWDER, LYOPHILIZED, FOR SOLUTION INTRAVENOUS
Status: DISCONTINUED | OUTPATIENT
Start: 2023-09-17 | End: 2023-09-17 | Stop reason: HOSPADM

## 2023-09-17 RX ORDER — MIDAZOLAM HYDROCHLORIDE 1 MG/ML
INJECTION INTRAMUSCULAR; INTRAVENOUS
Status: DISCONTINUED | OUTPATIENT
Start: 2023-09-17 | End: 2023-09-17

## 2023-09-17 RX ORDER — ENOXAPARIN SODIUM 100 MG/ML
40 INJECTION SUBCUTANEOUS EVERY 24 HOURS
Status: DISCONTINUED | OUTPATIENT
Start: 2023-09-17 | End: 2023-09-28 | Stop reason: HOSPADM

## 2023-09-17 RX ORDER — MEPERIDINE HYDROCHLORIDE 25 MG/ML
12.5 INJECTION INTRAMUSCULAR; INTRAVENOUS; SUBCUTANEOUS ONCE
Status: DISCONTINUED | OUTPATIENT
Start: 2023-09-17 | End: 2023-09-17 | Stop reason: HOSPADM

## 2023-09-17 RX ORDER — DEXMEDETOMIDINE HYDROCHLORIDE 100 UG/ML
INJECTION, SOLUTION INTRAVENOUS
Status: DISCONTINUED | OUTPATIENT
Start: 2023-09-17 | End: 2023-09-17

## 2023-09-17 RX ORDER — HYDROMORPHONE HYDROCHLORIDE 2 MG/ML
0.5 INJECTION, SOLUTION INTRAMUSCULAR; INTRAVENOUS; SUBCUTANEOUS EVERY 5 MIN PRN
Status: DISCONTINUED | OUTPATIENT
Start: 2023-09-17 | End: 2023-09-17 | Stop reason: HOSPADM

## 2023-09-17 RX ORDER — METHOCARBAMOL 750 MG/1
750 TABLET, FILM COATED ORAL 3 TIMES DAILY
Status: DISCONTINUED | OUTPATIENT
Start: 2023-09-17 | End: 2023-09-28 | Stop reason: HOSPADM

## 2023-09-17 RX ADMIN — FAMOTIDINE 20 MG: 20 TABLET, FILM COATED ORAL at 09:09

## 2023-09-17 RX ADMIN — Medication 6 MG: at 09:09

## 2023-09-17 RX ADMIN — ENOXAPARIN SODIUM 40 MG: 40 INJECTION SUBCUTANEOUS at 05:09

## 2023-09-17 RX ADMIN — LIDOCAINE HYDROCHLORIDE 80 MG: 20 INJECTION INTRAVENOUS at 11:09

## 2023-09-17 RX ADMIN — METHOCARBAMOL 750 MG: 750 TABLET ORAL at 02:09

## 2023-09-17 RX ADMIN — ROCURONIUM BROMIDE 60 MG: 10 SOLUTION INTRAVENOUS at 11:09

## 2023-09-17 RX ADMIN — SENNOSIDES AND DOCUSATE SODIUM 2 TABLET: 50; 8.6 TABLET ORAL at 09:09

## 2023-09-17 RX ADMIN — PROPOFOL 150 MG: 10 INJECTION, EMULSION INTRAVENOUS at 11:09

## 2023-09-17 RX ADMIN — HYDROCODONE BITARTRATE AND ACETAMINOPHEN 1 TABLET: 7.5; 325 TABLET ORAL at 06:09

## 2023-09-17 RX ADMIN — KETOROLAC TROMETHAMINE 30 MG: 30 INJECTION, SOLUTION INTRAMUSCULAR at 06:09

## 2023-09-17 RX ADMIN — DEXMEDETOMIDINE 10 MCG: 200 INJECTION, SOLUTION INTRAVENOUS at 12:09

## 2023-09-17 RX ADMIN — ROPIVACAINE HYDROCHLORIDE 10 ML: 5 INJECTION, SOLUTION EPIDURAL; INFILTRATION; PERINEURAL at 10:09

## 2023-09-17 RX ADMIN — ROCURONIUM BROMIDE 20 MG: 10 SOLUTION INTRAVENOUS at 12:09

## 2023-09-17 RX ADMIN — HYDROCODONE BITARTRATE AND ACETAMINOPHEN 1 TABLET: 7.5; 325 TABLET ORAL at 02:09

## 2023-09-17 RX ADMIN — OXYCODONE AND ACETAMINOPHEN 1 TABLET: 10; 325 TABLET ORAL at 09:09

## 2023-09-17 RX ADMIN — ROPIVACAINE HYDROCHLORIDE 20 ML: 5 INJECTION, SOLUTION EPIDURAL; INFILTRATION; PERINEURAL at 10:09

## 2023-09-17 RX ADMIN — ONDANSETRON 4 MG: 2 INJECTION INTRAMUSCULAR; INTRAVENOUS at 01:09

## 2023-09-17 RX ADMIN — OXYCODONE AND ACETAMINOPHEN 1 TABLET: 10; 325 TABLET ORAL at 05:09

## 2023-09-17 RX ADMIN — Medication 100 MCG: at 12:09

## 2023-09-17 RX ADMIN — DEXTROSE MONOHYDRATE 2 G: 50 INJECTION, SOLUTION INTRAVENOUS at 11:09

## 2023-09-17 RX ADMIN — SODIUM CHLORIDE, SODIUM GLUCONATE, SODIUM ACETATE, POTASSIUM CHLORIDE AND MAGNESIUM CHLORIDE: 526; 502; 368; 37; 30 INJECTION, SOLUTION INTRAVENOUS at 11:09

## 2023-09-17 RX ADMIN — EPHEDRINE SULFATE 10 MG: 50 INJECTION INTRAVENOUS at 01:09

## 2023-09-17 RX ADMIN — HYDROMORPHONE HYDROCHLORIDE 0.5 MG: 2 INJECTION INTRAMUSCULAR; INTRAVENOUS; SUBCUTANEOUS at 02:09

## 2023-09-17 RX ADMIN — FENTANYL CITRATE 50 MCG: 50 INJECTION, SOLUTION INTRAMUSCULAR; INTRAVENOUS at 11:09

## 2023-09-17 RX ADMIN — SODIUM CHLORIDE, SODIUM GLUCONATE, SODIUM ACETATE, POTASSIUM CHLORIDE AND MAGNESIUM CHLORIDE: 526; 502; 368; 37; 30 INJECTION, SOLUTION INTRAVENOUS at 01:09

## 2023-09-17 RX ADMIN — KETOROLAC TROMETHAMINE 30 MG: 30 INJECTION, SOLUTION INTRAMUSCULAR at 11:09

## 2023-09-17 RX ADMIN — METHOCARBAMOL 750 MG: 750 TABLET ORAL at 09:09

## 2023-09-17 RX ADMIN — FENTANYL CITRATE 50 MCG: 50 INJECTION, SOLUTION INTRAMUSCULAR; INTRAVENOUS at 01:09

## 2023-09-17 RX ADMIN — DEXAMETHASONE SODIUM PHOSPHATE 4 MG: 4 INJECTION, SOLUTION INTRA-ARTICULAR; INTRALESIONAL; INTRAMUSCULAR; INTRAVENOUS; SOFT TISSUE at 11:09

## 2023-09-17 RX ADMIN — Medication 100 MCG: at 11:09

## 2023-09-17 RX ADMIN — KETOROLAC TROMETHAMINE 30 MG: 30 INJECTION, SOLUTION INTRAMUSCULAR at 02:09

## 2023-09-17 RX ADMIN — GABAPENTIN 300 MG: 300 CAPSULE ORAL at 09:09

## 2023-09-17 RX ADMIN — POLYETHYLENE GLYCOL 3350 17 G: 17 POWDER, FOR SOLUTION ORAL at 09:09

## 2023-09-17 RX ADMIN — EPHEDRINE SULFATE 10 MG: 50 INJECTION INTRAVENOUS at 12:09

## 2023-09-17 RX ADMIN — SUGAMMADEX 200 MG: 100 INJECTION, SOLUTION INTRAVENOUS at 01:09

## 2023-09-17 RX ADMIN — MIDAZOLAM HYDROCHLORIDE 2 MG: 1 INJECTION, SOLUTION INTRAMUSCULAR; INTRAVENOUS at 11:09

## 2023-09-17 RX ADMIN — CEFAZOLIN SODIUM 2 G: 2 SOLUTION INTRAVENOUS at 08:09

## 2023-09-17 RX ADMIN — MIDAZOLAM HYDROCHLORIDE 2 MG: 1 INJECTION, SOLUTION INTRAMUSCULAR; INTRAVENOUS at 10:09

## 2023-09-17 NOTE — TRANSFER OF CARE
"Anesthesia Transfer of Care Note    Patient: Janeen Arrieta    Procedure(s) Performed: Procedure(s) (LRB):  ORIF, FRACTURE, PILON (Right)  REVISION, OF EXTERNAL FIXATION (Left)    Patient location: PACU    Anesthesia Type: general    Transport from OR: Transported from OR on room air with adequate spontaneous ventilation    Post pain: adequate analgesia    Post assessment: no apparent anesthetic complications    Post vital signs: stable    Level of consciousness: sedated    Nausea/Vomiting: no nausea/vomiting    Complications: none    Transfer of care protocol was followed      Last vitals:   Visit Vitals  /75   Pulse 62   Temp 36.6 °C (97.9 °F) (Oral)   Resp 20   Ht 5' 4.02" (1.626 m)   Wt 93.4 kg (206 lb)   SpO2 96%   Breastfeeding No   BMI 35.34 kg/m²     "

## 2023-09-17 NOTE — ANESTHESIA PROCEDURE NOTES
Peripheral Block POPLITEAL BLK    Patient location during procedure: pre-op   Block not for primary anesthetic.  Reason for block: at surgeon's request and post-op pain management   Post-op Pain Location: Ankle RT   Start time: 9/17/2023 10:34 AM  Timeout: 9/17/2023 10:30 AM   End time: 9/17/2023 10:40 AM    Staffing  Authorizing Provider: Mj Mandujano MD  Performing Provider: Mj Mandujano MD    Staffing  Performed by: Mj Mandujano MD  Authorized by: Mj Mandujano MD    Preanesthetic Checklist  Completed: patient identified, IV checked, site marked, risks and benefits discussed, surgical consent, monitors and equipment checked, pre-op evaluation and timeout performed  Peripheral Block  Patient position: prone  Prep: ChloraPrep  Patient monitoring: heart rate, continuous pulse ox, continuous capnometry and frequent blood pressure checks  Block type: popliteal  Laterality: right  Injection technique: single shot  Needle  Needle type: Stimuplex   Needle gauge: 21 G  Needle length: 4 in  Needle localization: anatomical landmarks and ultrasound guidance   -ultrasound image captured on disc.  Assessment  Injection assessment: negative aspiration, negative parasthesia and local visualized surrounding nerve  Paresthesia pain: none  Heart rate change: no  Slow fractionated injection: yes  Pain Tolerance: no complaints and comfortable throughout block  Medications:    Medications: ropivacaine (NAROPIN) injection 0.5% - Perineural   20 mL - 9/17/2023 10:37:00 AM    Additional Notes  VSS.  RN monitoring vitals throughout procedure.  Patient tolerated procedure well.    Block placed for surgical anesthesia   IV Sedation used and titrated for patient comfort-See MAR  Ultrasound guidance used to visualize the nerve bundle and sheath as well as confirm needle placement and deposition of the local anesthetic.  (1) Under ultrasound guidance, needle was inserted and placed in close proximity to the nerve  bundle  (2) Ultrasound was also used to visualize the spread of the anesthetic in close proximity to the brachial plexus  (3) The nerves appeared anatomically normal  (4) There were no apparent abnormal pathological findings    Ultrasound photos archived.  Pt tolerated procedure well. There were no immediate complications.

## 2023-09-17 NOTE — ANESTHESIA PROCEDURE NOTES
Peripheral Block    Patient location during procedure: pre-op   Block not for primary anesthetic.  Reason for block: at surgeon's request and post-op pain management   Post-op Pain Location: Right ankle   Start time: 9/17/2023 10:40 AM  Timeout: 9/17/2023 10:25 AM   End time: 9/17/2023 10:45 AM    Staffing  Authorizing Provider: Mj Mandujano MD  Performing Provider: Mj Mandujano MD    Staffing  Performed by: Mj Mandujano MD  Authorized by: Mj Mandujano MD    Preanesthetic Checklist  Completed: patient identified, IV checked, site marked, risks and benefits discussed, surgical consent, monitors and equipment checked, pre-op evaluation and timeout performed  Peripheral Block  Patient position: supine  Prep: ChloraPrep and site prepped and draped  Patient monitoring: heart rate, cardiac monitor, continuous pulse ox, continuous capnometry and frequent blood pressure checks  Block type: adductor canal  Laterality: right  Injection technique: single shot  Needle  Needle type: Stimuplex   Needle gauge: 22 G  Needle length: 2 in  Needle localization: anatomical landmarks and ultrasound guidance  Catheter type: spring wound  Catheter size: 19 G  Test dose: lidocaine 1.5% with Epi 1-to-200,000 and negative   -ultrasound image captured on disc.  Assessment  Injection assessment: negative aspiration, negative parasthesia and local visualized surrounding nerve  Paresthesia pain: none  Heart rate change: no  Slow fractionated injection: yes  Pain Tolerance: comfortable throughout block and no complaints  Medications:    Medications: ropivacaine (NAROPIN) injection 0.5% - Perineural   10 mL - 9/17/2023 10:43:00 AM

## 2023-09-17 NOTE — OP NOTE
OPERATIVE REPORT    Patient: Janeen Arrieta   : 1975    MRN: [id number]  Date: 2023      Surgeon:Ryan Humphrey DO  Assistant: Medardo Rossi was essential, part of the procedure including deep hardware placement and deep closure.  No senior assistant was availible  Preoperative Diagnosis:  Right pilon fracture s/p Ex fix, Left pilon fx s/p ex fix  Postoperative Diagnosis: Same  Procedure:    1) Open treatment of fracture of weight bearing articular surface of right distal tibia, of tibia only CPT- 19323  2) multiplane external fixator placement left ankle   3) external fixator removal right ankle   4) external fixator removal left ankle   Anesthesiologist: Mj Mandujano MD  OR Staff: Circulator: Hortensia Black RN  Radiology Technologist: Gunnar Izquierdo RT Relief Circulator: Ana Luisa Merino RN  Scrub Person: Jase Blanco  Implants:   Implant Name Type Inv. Item Serial No.  Lot No. LRB No. Used Action   CLAMP COMBINATION / LG EXT FIX - [id number]  CLAMP COMBINATION / LG EXT FIX  SYNTHES [id number] Left 6 Implanted   CLAMP LG 6 POSITION MULTI-PIN - [id number]  CLAMP LG 6 POSITION MULTI-PIN  [employer]  Left 1 Implanted   POST FIXATOR EXTERAL 11MM SS - [id number]  POST FIXATOR EXTERAL 11MM SS  [employer] [id number] Left 2 Implanted   CLAMP EXT FIX COMBO MDM 8-11MM - [id number]  CLAMP EXT FIX COMBO MDM 8-11MM  SYNTHES [id number] Left 2 Implanted   GEOVANNA EXT FIX 66V023WD CARB FIBE - [id number]  GEOVANNA EXT FIX [id number] CARB FIBE  [employer]  Left 2 Implanted   GEOVANNA CARBON FIBER 11MM X 400MM - [id number]  GEOVANNA CARBON FIBER 11MM X 400MM  SYNTHES [id number] Left 2 Implanted   SCREW SCHANZ 4.0 X125 - [id number]  SCREW SCHANZ 4.0 X125  SY[employer]ES [id number] Left 2 Implanted   SCREW SCHANZ 5/60/150 294.784 - [id number]  SCREW SCHANZ 5/60/150 294.784  SYNTHES [id number] Left 2 Implanted   2.7MM VA-LCP DISTAL TIBIA L-PLATE/6 HOLES/RIGHT Plate   SY[employer]ES [id number] Left 1 Implanted   MONTAGE FANTA,  RESORBABLE HEMOSTATIC BONE PUTTY     [id number] Left 1 Implanted   SCREW TI T6 SH THRD [id number] - [id number]  SCREW TI T6 SH THRD [id number]  [employer]  Left 1 Implanted   2.0X20MM CANNULATED COMPRESSION HEADLESS SCREW    [employer]ES  Left 1 Implanted   2.0X22MM CANNULATED COMPRESSION HEADLESS SCREW (TAV)    [employer]ES  Left 1 Implanted   3.5x40mm cannulated compression headless screw    [employer]  Left 2 Implanted     EBL: 50cc  Complications: None  Disposition: To PACU, stable    Indications: Janeen Arrieta is a 47 y.o. female presenting with the aforementioned injuries/findings. The procedure is indicated to best obtain and maintain articular alignment and allow early ROM in efforts to optimize outcomes.  The patient is awake and alert. After thorough discussion of the risks, benefits, expected outcomes, and alternatives to surgical intervention, the patient agreed to proceed with surgical treatment.  Specific risks discussed included, but were not limited to: superficial or deep infection, wound healing complications, DVT/PE, significant bleeding requiring transfusion, damage to named anatomic structures in the immediate area including named neurovascular structures, implant failure or prominence, development of DJD, malunion, and general risks of anesthesia.  The patient voiced understanding and written as well as verbal consent was obtained by myself prior to the procedure.      Patient is familiar with this injury from her  who suffered a similar injury.  It distal tibia intra-articular fracture on its own is severe and debilitating.  Patient has bilateral ankle fractures consistent with Pilon type.  Patient is a increased risk of chronic disability chronic pain possible need for further surgery nonunion malunion and even amputation instead a deep infection.  Patient understands this along with the risks stated above.    Procedure Note:  The patient was brought back to the OR and placed on the OR table. After  successful induction of anesthesia by anesthesia staff, the patient was positioned in the supine position and all bony prominences were padded appropriately.  A tourniquet was not applied.  The surgical field was then provisionally cleansed and then prepped and draped in the usual sterile fashion.    At this time a time-out was performed, with the correct patient, site, and procedure identified.  The universal time out as well as sign your site protocols were followed.  Preoperative antibiotics were verified as administered.     Attention is drawn to the left ankle.  Patient has instability about the ankle due to severe fracture pattern.  The ex fix it with the originally placed was removed without complication.  I then moved the tibial pins proximal way from incision sites and placed a navicular pin along with the 5th metatarsal pin this allows me to control the distal foot as a segment and control multiple planes.  I then placed a Synthes ex fix onto the calcaneus pin and distal segment and then manipulated the distal segment in multiple planes and tightened this down under intraoperative fluoroscopy.    Attention is now drawn to the right ankle.  I removed the previous ex fix without complication      An anteromedial approach to the distal tibia was utilized and attention to hemostasis was paid using electrocautery.  We were able to identify and protect all relevant neurovascular structures and came down on the fracture lines without difficulty.  The interposing periosteum and clot was removed and the area irrigated to remove some of the fracture hematoma.  We were able to directly manipulate the fracture fragments into position and held these initially with sharp reduction tenaculums and provisional K-wires.  After verifying reduction with clinical evaluation as well as C-arm, we held some of the fracture fragments in position with screw(s).  A Synthes distal tibia plate was chosen and placed into position, and  held into place with a K-wire.  Several appropriate length screws were placed into the plate on each side of the fracture lines.  Additional hardware was needed for the tibial fixation including 2 headless compression screws through the medial malleolus for stability    We then performed stress exam of the fibula and syndesmosis, and found the ankle joint to be stable.  No additional fixation was indicated.  Final C-arm images were obtained and saved to the Live[employer] system.      The incisions were then irrigated using copious sterile saline and then closed in layered fashion with vanco placed in the wound.  The leg was sterilely cleansed and dressed.  We then placed in a walking boot  The patient was then subsequently extubated and transferred to to PACU in a stable condition.    All sponge and needle counts were correct at the end of the case.  I was present and participated in all aspects of the procedure.    Prognosis:  The patient will be kept NWB RLE 10 weeks. NWB LLE until definitive fixation. Patient will need to return to the OR in the coming days for left ankle open reduction internal fixation of the distal articular fracture..  DVT prophylaxis is indicated for this patient and procedure.  The patient is at risk of continued pain, arthritis, need for further surgeries, infection, and more; this was discussed with her postoperatively as well.      Patient increased risk of malunion nonunion painful joint possible need for further fusions and or fixation surgeries along with possible amputation.    This note/OR report was created with the assistance of  voice recognition software or phone  dictation.  There may be transcription errors as a result of using this technology however minimal. Effort has been made to assure accuracy of transcription but any obvious errors or omissions should be clarified with the author of the document.       Ryan Humphrey,   Orthopedic Trauma Surgery

## 2023-09-17 NOTE — ANESTHESIA PROCEDURE NOTES
Intubation    Date/Time: 9/17/2023 11:32 AM    Performed by: Jam Lopez CRNA  Authorized by: Mj Mandujano MD    Intubation:     Induction:  Intravenous    Intubated:  Postinduction    Mask Ventilation:  Easy mask    Attempts:  1    Attempted By:  CRNA    Method of Intubation:  Direct    Blade:  Herrera 2    Laryngeal View Grade: Grade IIb - only the arytenoids and epiglottis seen      Difficult Airway Encountered?: No      Complications:  None    Airway Device:  Oral endotracheal tube    Airway Device Size:  7.0    Style/Cuff Inflation:  Cuffed (inflated to minimal occlusive pressure)    Inflation Amount (mL):  6    Tube secured:  22    Secured at:  The lips    Placement Verified By:  Capnometry    Complicating Factors:  None    Findings Post-Intubation:  BS equal bilateral and atraumatic/condition of teeth unchanged

## 2023-09-17 NOTE — ANESTHESIA PREPROCEDURE EVALUATION
This is a 47-year-old female who presented to the [facility] ED with complaints of bilateral ankle pain status post fall from a 10-12 foot ladder.  She states that when she fell the impact was to her bilateral lower extremities.  Endorses bilateral ankle pain with intermittent numbness and tingling.  Radiographs of the right ankle performed in the ED demonstrated an oblique fracture through the right distal tibia with anterior displacement and lateral angulation and mildly displaced fracture of the medial malleolus.  Radiographs of the left ankle performed in the ED demonstrated comminuted fracture of the tibial plafond.  She also complained of right forearm pain with an associated laceration.  Radiographs performed in the ED did not appreciate any acute osseous pathology.  Posterior Orthoglass splints applied to the bilateral ankles.  Orthopedic surgery consulted for further evaluation recommendations.                                                                                                         09/17/2023  Janeen Arrieta is a 47 y.o., female.    Procedure Information    Case: [id number] Date/Time: 09/17/23 1311   Procedure: ORIF, FRACTURE, PILON (Bilateral: Leg Upper) - Supine vascular bed bone foam c arm Manipulate Ex fix on L with Yamel Ex fix stuff, Fix Right ankle. Need to remova  ex fix, Synthes medial pilon plate, CCHS 4.0   Anesthesia type: General   Diagnosis:        Closed displaced pilon fracture of right tibia, initial encounter [S82.871A]       Closed displaced pilon fracture of left tibia, initial encounter [S82.872A]   Pre-op diagnosis:        Closed displaced pilon fracture of right tibia, initial encounter [S82.871A]       Closed displaced pilon fracture of left tibia, initial encounter [S82.872A]   Location: [facility] OR  / [facility] OR   Surgeons: Ryan Humphrey, DO       Pre-op Assessment    I have reviewed the Patient Summary Reports.     I have reviewed the Nursing Notes. I have reviewed the NPO  Status.   I have reviewed the Medications.     Review of Systems  Anesthesia Hx:  No problems with previous Anesthesia    Hematology/Oncology:  Hematology Normal   Oncology Normal     EENT/Dental:EENT/Dental Normal   Cardiovascular:  Cardiovascular Normal Exercise tolerance: good   Functional Capacity good / => 4 METS    Pulmonary:  Pulmonary Normal    Renal/:   Denies Chronic Renal Disease.     Hepatic/GI:  Hepatic/GI Normal    Musculoskeletal:  Musculoskeletal Normal    Neurological:  Neurology Normal    Endocrine:  Endocrine Normal  Denies Morbid Obesity / BMI > 40  Dermatological:  Skin Normal    Psych:  Psychiatric Normal           Physical Exam  General: Alert, Oriented, Well nourished and Cooperative    Airway:  Mallampati: II   Mouth Opening: Normal  TM Distance: Normal  Tongue: Normal  Neck ROM: Normal ROM    Dental:  Intact    Chest/Lungs:  Clear to auscultation, Normal Respiratory Rate    Heart:  Rate: Normal  Rhythm: Regular Rhythm       Latest Reference Range & Units Most Recent   WBC 4.50 - 11.50 x10(3)/mcL 8.52  9/17/23 05:10   RBC 4.20 - 5.40 x10(6)/mcL 3.78 (L)  9/17/23 05:10   Hemoglobin 12.0 - 16.0 g/dL 10.8 (L)  9/17/23 05:10   Hematocrit 37.0 - 47.0 % 32.6 (L)  9/17/23 05:10   MCV 80.0 - 94.0 fL 86.2  9/17/23 05:10   MCH 27.0 - 31.0 pg 28.6  9/17/23 05:10   MCHC 33.0 - 36.0 g/dL 33.1  9/17/23 05:10   RDW 11.5 - 17.0 % 14.4  9/17/23 05:10   Platelets 130 - 400 x10(3)/mcL 222  9/17/23 05:10   MPV 7.4 - 10.4 fL 10.5 (H)  9/17/23 05:10   Neut % % 87.7  9/16/23 04:54   LYMPH % % 7.5  9/16/23 04:54   Mono % % 4.3  9/16/23 04:54   Eosinophil % % 0.0  9/16/23 04:54   Basophil % % 0.2  9/16/23 04:54   Immature Granulocytes % 0.3  9/16/23 04:54   Gran # (ANC) 1.5 - 6.9 x10(3)/mcL 5.6  6/27/17 09:43   Neut # 2.1 - 9.2 x10(3)/mcL 9.71 (H)  9/16/23 04:54   Lymph # 0.6 - 4.6 x10(3)/mcL 0.83  9/16/23 04:54   Mono # 0.1 - 1.3 x10(3)/mcL 0.48  9/16/23 04:54   Eos # 0 - 0.9 x10(3)/mcL 0.00  9/16/23 04:54   Baso  # <=0.2 x10(3)/mcL 0.02  9/16/23 04:54   Immature Grans (Abs) 0 - 0.04 x10(3)/mcL 0.03  9/16/23 04:54   nRBC % 0.0  9/17/23 05:10   Protime 12.5 - 14.5 seconds 12.8  9/15/23 15:14   INR <=1.3  1.0  9/15/23 15:14   aPTT 23.2 - 33.7 seconds 23.4  9/15/23 15:14   Sodium 136 - 145 mmol/L 142  9/17/23 05:10   Potassium 3.5 - 5.1 mmol/L 4.0  9/17/23 05:10   Chloride 98 - 107 mmol/L 109 (H)  9/17/23 05:10   CO2 22 - 29 mmol/L 27  9/17/23 05:10   Anion Gap mEq/L 6.0  9/17/23 05:10   BUN 7.0 - 18.7 mg/dL 12.7  9/17/23 05:10   Creatinine 0.55 - 1.02 mg/dL 0.80  9/17/23 05:10   BUN/CREAT RATIO  16  9/17/23 05:10   eGFR mls/min/1.73/m2 >60  9/17/23 05:10   eGFR if non African American mL/min/1.73 m2 >60  6/27/17 09:43   eGFR if African American mL/min/1.73 m2 >60  6/27/17 09:43   Glucose 74 - 100 mg/dL 95  9/17/23 05:10   Calcium 8.4 - 10.2 mg/dL 8.4  9/17/23 05:10   Alkaline Phosphatase 40 - 150 unit/L 96  9/16/23 04:54   PROTEIN TOTAL 6.4 - 8.3 gm/dL 7.0  9/16/23 04:54   Albumin 3.5 - 5.0 g/dL 3.9  9/16/23 04:54   Albumin/Globulin Ratio 1.1 - 2.0 ratio 1.3  9/16/23 04:54   BILIRUBIN TOTAL <=1.5 mg/dL 0.8  9/16/23 04:54   Bilirubin Direct 0.00 - 0.30 mg/dL 0.20  6/27/17 09:43   Bilirubin, Indirect mg/dL 0.50  6/27/17 09:43   AST 5 - 34 unit/L 16  9/16/23 04:54   ALT 0 - 55 unit/L 14  9/16/23 04:54   Globulin, Total 2.4 - 3.5 gm/dL 3.1  9/16/23 04:54   Cholesterol 140 - 200 mg/dL 171  6/27/17 09:43   HDL 35 - 59 mg/dL 52  6/27/17 09:43   LDL Cholesterol External 100 - 129 mg/dL 113  6/27/17 09:43   Total Cholesterol/HDL Ratio 35 - 59 mg/dL 3 (L)  6/27/17 09:43   Triglycerides 35 - 150 mg/dL 73  6/27/17 09:43   Very Low Density Lipoprotein mg/dL 15  6/27/17 09:43   Lactate, Frankie 0.5 - 2.2 mmol/L 0.9  9/15/23 17:04   Thyroid Stimulating Hormone 0.358 - 3.740 mIU/mL 1.373  6/27/17 09:43   Vit D, 25-Hydroxy 30.00 - 80.00 ng/mL 23.31 (L)  6/27/17 09:43   Alcohol, Serum <=10.0 mg/dL <10.0  9/15/23 15:14   Benzodiazepine Screen, Urine  Negative  Positive !  9/16/23 22:58   Phencyclidine Negative  Negative  9/16/23 22:58   Cocaine (Metab.) Negative  Negative  9/16/23 22:58   Opiate Scrn, Ur Negative  Positive !  9/16/23 22:58   Barbiturate Screen, Ur Negative  Negative  9/16/23 22:58   Amphetamine Screen, Ur Negative  Negative  9/16/23 22:58   Fentanyl, Urine Negative  Positive !  9/16/23 22:58   Cannabinoids, Urine Negative  Negative  9/16/23 22:58   MDMA, Urine Negative  Negative  9/16/23 22:58   Specific Fenton, Urine Auto 1.001 - 1.035  1.009  9/16/23 22:58   Group & Rh  A POS  9/15/23 15:14   Indirect Melva GEL  NEG  9/15/23 15:14   Specimen Outdate  09/18/2023 23:59  9/15/23 15:14   Color, UA Yellow, Light-Yellow, Dark Yellow, Aicha, Straw  Yellow  9/16/23 22:57   Appearance, UA Clear  Clear  9/16/23 22:57   Specific Gravity,UA 1.005 - 1.030  1.009  9/16/23 22:57   pH, UA 5.0 - 8.5  6.0  9/16/23 22:57   pH, Urine 3.0 - 11.0  6.0  9/16/23 22:58   Protein, UA Negative  Negative  9/16/23 22:57   Glucose, UA Negative, Normal  Negative  9/16/23 22:57   Ketones, UA Negative  Negative  9/16/23 22:57   Occult Blood UA Negative  Negative  9/16/23 22:57   NITRITE UA Negative  Negative  9/16/23 22:57   UROBILINOGEN UA >0.2 EU/dL 0.2  6/27/17 09:43   Bilirubin (UA) >Negative  Negative  6/27/17 09:43   Bilirubin, UA Negative  Negative  9/16/23 22:57   Urobilinogen, UA 0.2, 1.0, Normal  0.2  9/16/23 22:57   Leukocytes, UA Negative  Negative  9/16/23 22:57   RBC, UA <=5 /HPF <5  9/16/23 22:57   WBC, UA <=5 /HPF <5  9/16/23 22:57   Bacteria, UA None Seen, Rare, Occasional /HPF None Seen  9/16/23 22:57   Squam Epithel, UA <=5 /HPF <5  9/16/23 22:57   CULTURE, URINE  Rpt  7/3/18 07:47   CT CERVICAL SPINE WITHOUT CONTRAST  Rpt  9/15/23 15:57   CT HEAD WITHOUT CONTRAST  Rpt  9/15/23 15:56   CT CHEST ABDOMEN PELVIS WITH CONTRAST (XPD)  Rpt  9/15/23 15:59   XR ANKLE 2 VIEW LEFT  Rpt  9/15/23 20:02   XR ANKLE 2 VIEW RIGHT  Rpt  9/15/23 20:02   XR ANKLE COMPLETE 3  VIEW LEFT  Rpt  9/15/23 15:32   XR ANKLE COMPLETE 3 VIEW RIGHT  Rpt  9/15/23 15:31   XR CHEST 1 VIEW  Rpt  9/15/23 15:29   XR FOREARM RIGHT  Rpt  9/15/23 16:00   XR LUMBAR SPINE 5 VIEW  Rpt  6/27/17 10:06   XR PELVIS ROUTINE AP  Rpt  9/15/23 15:30   XR TIBIA FIBULA 2 VIEW LEFT  Rpt  9/15/23 17:04   XR TIBIA FIBULA 2 VIEW RIGHT  Rpt  9/15/23 17:03   MAMMO DIGITAL SCREENING BILAT  Rpt  8/10/20 09:11   MAMMO DIGITAL SCREENING BILAT WITH CARLOS  Rpt  9/19/22 11:46   CT ANKLE (INCLUDING HINDFOOT) WITHOUT CONTRAST BILATERAL  Rpt  9/16/23 06:05   SURG - FL SURGERY FLUORO USAGE  Rpt  9/15/23 19:09   (L): Data is abnormally low  (H): Data is abnormally high  !: Data is abnormal  Rpt: View report in Results Review for more information    Anesthesia Plan  Type of Anesthesia, risks & benefits discussed:    Anesthesia Type: Gen ETT  Intra-op Monitoring Plan: Standard ASA Monitors  Post Op Pain Control Plan: multimodal analgesia  Induction:  IV and Inhalation  Airway Plan: Direct  Informed Consent: Informed consent signed with the Patient and all parties understand the risks and agree with anesthesia plan.  All questions answered. Patient consented to blood products? Yes  ASA Score: 2  Day of Surgery Review of History & Physical: H&P Update referred to the surgeon/provider.I have interviewed and examined the patient. I have reviewed the patient's H&P dated: There are no significant changes.   Anesthesia Plan Notes: Surgeon states will do ORIF on rt ankle and minimal work on lft ankle, will do a RT POPLITEAL BLK AND RT ADDUCTOR CANAL BLK( Naropin 0.5% 20 cc popliteal and 10 cc rt adductor canal) FOR POST OP PAIN CONTROLL     Ready For Surgery From Anesthesia Perspective.     .

## 2023-09-17 NOTE — CONSULTS
[facility]  Orthopedic Trauma  Consult Note    Patient Name: Janeen Arrieta  MRN: [id number]  Admission Date: 9/15/2023  Hospital Length of Stay: 2 days  Attending Provider: Felix Charles MD  Primary Care Provider: Andrea Norman MD        Consults  Subjective:         Chief Complaint:   Chief Complaint   Patient presents with    Trauma     Fell from the ladder approximately 10-12ft, denies LOC. Noted with significant swelling to bilateral ankle and deformity to tib, left        HPI:  Patient had a fall from a ladder proximally 10-12 feet.  She has bilateral intra-articular ankle fractures which lead to multiple surgeries disability and even amputation.  Her  suffered a similar injury my practice proximally 1 year ago.  Patient denies numbness tingling.  Currently he is dull achy pain in bilateral ankles underwent external fixation with my partner Dr. Charles and has been past my service for definitive fixation.    History reviewed. No pertinent past medical history.    History reviewed. No pertinent surgical history.    Review of patient's allergies indicates:  No Known Allergies    Current Facility-Administered Medications   Medication    0.9%  NaCl infusion    acetaminophen tablet 650 mg    aluminum-magnesium hydroxide-simethicone 200-200-20 mg/5 mL suspension 30 mL    aspirin chewable tablet 81 mg    [START ON 9/18/2023] bisacodyL suppository 10 mg    docusate sodium capsule 200 mg    famotidine tablet 20 mg    gabapentin capsule 300 mg    HYDROcodone-acetaminophen 7.5-325 mg per tablet 1 tablet    ketorolac injection 30 mg    lactulose 20 gram/30 mL solution Soln 20 g    melatonin tablet 6 mg    methocarbamoL tablet 750 mg    ondansetron injection 4 mg    ondansetron injection 4 mg    oxyCODONE-acetaminophen 5-325 mg per tablet 1 tablet    polyethylene glycol packet 17 g    senna-docusate 8.6-50 mg per tablet 2 tablet    sodium chloride 0.9% flush 10 mL     Family History   "  None       Tobacco Use    Smoking status: Not on file    Smokeless tobacco: Not on file   Substance and Sexual Activity    Alcohol use: Not on file    Drug use: Not on file    Sexual activity: Not on file       ROS:  Constitutional: Denies fever chills  Eyes: No change in vision  ENT: No ringing or current infections  CV: No chest pain  Resp: No labored breathing  MSK: Pain evident at site of injury located in HPI,   Integ: No signs of abrasions or lacerations  Neuro: No numbness or tingling  Lymphatic: No swelling outside the area of injury   Objective:     Vital Signs (Most Recent):  Temp: 97.9 °F (36.6 °C) (09/17/23 0736)  Pulse: 72 (09/17/23 0736)  Resp: 18 (09/17/23 0623)  BP: 125/81 (09/17/23 0736)  SpO2: (!) 94 % (09/17/23 0736) Vital Signs (24h Range):  Temp:  [97.6 °F (36.4 °C)-98.5 °F (36.9 °C)] 97.9 °F (36.6 °C)  Pulse:  [67-86] 72  Resp:  [18] 18  SpO2:  [94 %-99 %] 94 %  BP: (107-144)/(66-81) 125/81     Weight: 93.4 kg (206 lb)  Height: 5' 4.02" (162.6 cm)  Body mass index is 35.34 kg/m².      Intake/Output Summary (Last 24 hours) at 9/17/2023 0839  Last data filed at 9/17/2023 0500  Gross per 24 hour   Intake --   Output 1700 ml   Net -1700 ml       Ortho/SPM Exam  General the patient is alert and oriented x3 no acute distress nontoxic-appearing appropriate affect.    Constitutional: Vital signs are examined and stable.  Resp: No signs of labored breathing               LLE: -Skin:  Ex fix intact No signs of new abrasions or lacerations, no scars           -MSK: Hip and Knee F/E, EHL/FHL Strength 5/5           -Neuro:  Sensation intact to light touch L3-S1 dermatomes           -Lymphatic: No signs of lymphadenopathy           -CV: Capillary refill is less than 2 seconds. DP/PT pulses 2/4. Compartments soft and compressible                      RLE: -Skin:  Ex fix intact No signs of new abrasions or lacerations, no scars           -MSK: : Hip and Knee F/E, EHL/FHL, Strength 5/5           -Neuro:  " Sensation intact to light touch L3-S1 dermatomes           -Lymphatic: No signs of lymphadenopathy           -CV: Capillary refill is less than 2 seconds. DP/PT pulses  2/4. Compartments soft and compressible.     Significant Labs:   Recent Lab Results         09/17/23  0510   09/16/23 2258 09/16/23 2257        Phencyclidine   Negative         Amphetamine Screen, Ur   Negative         Anion Gap 6.0           Appearance, UA     Clear       Bacteria, UA     None Seen       Barbiturate Screen, Ur   Negative         Benzodiazepine Screen, Urine   Positive         Bilirubin, UA     Negative       BUN 12.7           BUN/CREAT RATIO 16           Calcium 8.4           Cannabinoids, Urine   Negative         Chloride 109           CO2 27           Cocaine (Metab.)   Negative         Color, UA     Yellow       Creatinine 0.80           eGFR >60           Fentanyl, Urine   Positive         Glucose 95           Glucose, UA     Negative       Hematocrit 32.6           Hemoglobin 10.8           Ketones, UA     Negative       Leukocytes, UA     Negative       MCH 28.6           MCHC 33.1           MCV 86.2           MDMA, Urine   Negative         MPV 10.5           NITRITE UA     Negative       nRBC 0.0           Occult Blood UA     Negative       Opiate Scrn, Ur   Positive         pH, UA     6.0       pH, Urine   6.0         Platelets 222           Potassium 4.0           Protein, UA     Negative       RBC 3.78           RBC, UA     <5       RDW 14.4           Sodium 142           Specific Gravity,UA     1.009       Specific Gravity, Urine Auto   1.009         Squam Epithel, UA     <5       Urobilinogen, UA     0.2       WBC, UA     <5       WBC 8.52                 All pertinent labs within the past 24 hours have been reviewed.  Recent Lab Results  (Last 5 results in the past 72 hours)        09/17/23  0510   09/16/23 2258   09/16/23  2257 09/16/23  0454   09/15/23  1704        Phencyclidine   Negative              Albumin/Globulin Ratio       1.3         Albumin       3.9         Alkaline Phosphatase       96         ALT       14         Amphetamine Screen, Ur   Negative             Anion Gap 6.0               Appearance, UA     Clear           AST       16         Bacteria, UA     None Seen           Barbiturate Screen, Ur   Negative             Baso #       0.02         Basophil %       0.2         Benzodiazepine Screen, Urine   Positive             BILIRUBIN TOTAL       0.8         Bilirubin, UA     Negative           BUN 12.7       8.4         BUN/CREAT RATIO 16               Calcium 8.4       9.0         Cannabinoids, Urine   Negative             Chloride 109       106         CO2 27       23         Cocaine (Metab.)   Negative             Color, UA     Yellow           Creatinine 0.80       0.81         eGFR >60       >60         Eos #       0.00         Eosinophil %       0.0         Fentanyl, Urine   Positive             Globulin, Total       3.1         Glucose 95       128         Glucose, UA     Negative           Hematocrit 32.6       38.1                37.1         Hemoglobin 10.8       12.4                12.0         Immature Grans (Abs)       0.03         Immature Granulocytes       0.3         Ketones, UA     Negative           Lactate, Frankie         0.9       Leukocytes, UA     Negative           Lymph #       0.83         LYMPH %       7.5         MCH 28.6       27.9                27.5         MCHC 33.1       32.5                32.3         MCV 86.2       85.6                85.1         MDMA, Urine   Negative             Mono #       0.48         Mono %       4.3         MPV 10.5       10.8                10.5         Neut #       9.71         Neut %       87.7         NITRITE UA     Negative           nRBC 0.0       0.0                0.0         Occult Blood UA     Negative           Opiate Scrn, Ur   Positive             pH, UA     6.0           pH, Urine   6.0             Platelets 222       258                 258         Potassium 4.0       4.7         PROTEIN TOTAL       7.0         Protein, UA     Negative           RBC 3.78       4.45                4.36         RBC, UA     <5           RDW 14.4       14.1                13.7         Sodium 142       139         Specific Gravity,UA     1.009           Specific Gravity, Urine Auto   1.009             Squam Epithel, UA     <5           Urobilinogen, UA     0.2           WBC, UA     <5           WBC 8.52       11.47                11.07                                 Significant Imaging: I have reviewed all pertinent imaging results/findings.  CT Ankle (Including Hindfoot) Without Contrast Bilateral    Result Date: 9/16/2023  EXAMINATION CT ANKLE (INCLUDING HINDFOOT) WITHOUT CONTRAST BILATERAL CLINICAL HISTORY Ankle trauma, fracture, xray done (Age >= 5y);surgical planning; TECHNIQUE Non-contrast helical-acquisition CT images of the ankles were obtained.  Multiplanar reconstructions accomplished by a CT technologist at a separate workstation, pushed to PACS for physician review. COMPARISON 15 September 2023 FINDINGS Images were reviewed in bone and soft tissue windows. Exam quality: adequate for evaluation Interval placement of external fixation device is noted bilaterally.  Overall appearance of bilateral ankle fractures consistent with improved alignment, with no new or worsened focal osseous irregularity identified.  No new joint incongruity is appreciated.  Regional soft tissues are without significant interval change. IMPRESSION Bilateral external fixation of the ankles with improved fracture alignment. RADIATION DOSE Automated tube current modulation, weight-based exposure dosing, and/or iterative reconstruction technique utilized to reach lowest reasonably achievable exposure rate. DLP: 146 mGy*cm Electronically signed by: Jase Arriaga Date:    09/16/2023 Time:    09:19    X-Ray Ankle 2 View Left    Result Date: 9/15/2023  EXAMINATION: Left ankle.  CLINICAL HISTORY: Postop. TECHNIQUE: Two-view COMPARISON: September 15, 2023 FINDINGS: Interval placement of the traction device. Known severely comminuted fractures of the distal tibia.     As above. Electronically signed by: Dale Sandhu Date:    09/15/2023 Time:    20:10    X-Ray Ankle 2 View Right    Result Date: 9/15/2023  EXAMINATION: XR ANKLE 2 VIEW RIGHT CLINICAL HISTORY: postop; TECHNIQUE: Two views COMPARISON: September 15, 2023 FINDINGS: Interval placement of a traction device.  Known severe fractures of the distal tibia with improved displacement.     As above. Electronically signed by: Dale Sandhu Date:    09/15/2023 Time:    20:09    SURG FL Surgery Fluoro Usage    Result Date: 9/15/2023  See OP Notes for results. IMPRESSION: See OP Notes for results. This procedure was auto-finalized by: Virtual Radiologist    X-Ray Tibia Fibula 2 View Right    Result Date: 9/15/2023  EXAMINATION: XR TIBIA FIBULA 2 VIEW RIGHT CLINICAL HISTORY: Injury, unspecified, initial encounter TECHNIQUE: AP and lateral views of the right tibia and fibula were performed. COMPARISON: None. FINDINGS: There is a comminuted fracture of the distal tibia.  It is displaced.  No dislocation is seen.  The fibula is intact.  There is diffuse soft tissue swelling in the ankle.     Comminuted fracture distal tibia Electronically signed by: Jarett Montesinos Date:    09/15/2023 Time:    17:15    X-Ray Tibia Fibula 2 View Left    Result Date: 9/15/2023  EXAMINATION: XR TIBIA FIBULA 2 VIEW LEFT CLINICAL HISTORY: Injury, unspecified, initial encounter TECHNIQUE: AP and lateral views of the left tibia and fibula were performed. COMPARISON: None. FINDINGS: There is a comminuted fracture of the distal tibia.  There is displacement seen.  There is diffuse soft tissue swelling in the ankle.     Comminuted fracture of the distal tibia with displacement Electronically signed by: Jarett Montesinos Date:    09/15/2023 Time:    17:10    X-Ray Forearm  Right    Result Date: 9/15/2023  EXAMINATION: XR FOREARM RIGHT CLINICAL HISTORY: Pain in arm, unspecified TECHNIQUE: AP and lateral views of the right forearm COMPARISON: None FINDINGS: No acute fracture identified.  Joint alignments are maintained.  Small olecranon enthesophyte.  Few small areas of retained debris along the skin surface of the upper forearm.     No acute osseous process appreciated. Electronically signed by: Claudy Mcgill Date:    09/15/2023 Time:    16:22    CT Ankle (Including Hindfoot) Without Contrast Bilateral    Result Date: 9/15/2023  EXAMINATION: CT ANKLE (INCLUDING HINDFOOT) WITHOUT CONTRAST BILATERAL CLINICAL HISTORY: Fracture, ankle; TECHNIQUE: Noncontrast CT imaging of both ankles.  Axial, coronal and sagittal reformatted images reviewed.   Dose length product is 105 mGycm. Automatic exposure control, adjustment of mA/kV or iterative reconstruction technique used to limit radiation dose. COMPARISON: Radiographs earlier today FINDINGS: On the right, there is a comminuted fracture of the distal tibia with displacement of to 7 mm.  Largest fracture fragment is laterally angulated.  Minimally displaced transverse fracture through the medial malleolus.  Few tiny chip fractures near the tip of the distal fibula. On the left, there is also a comminuted tibial fracture with extension into the medial malleolus.  On this side, fragments are displaced by up to about 2 cm.  Several small osseous fragments lie between the distal fibula and talus.     Comminuted bilateral tibial fractures.  Several tiny chip fractures arising from the distal right fibula.  Several additional tiny fracture fragments positioned between the distal left fibula and talus are of unclear origin. Electronically signed by: Claudy Mcgill Date:    09/15/2023 Time:    16:16    CT Chest Abdomen Pelvis With Contrast (xpd)    Result Date: 9/15/2023  EXAMINATION: CT CHEST ABDOMEN PELVIS WITH CONTRAST (XPD) CLINICAL HISTORY: Trauma;  TECHNIQUE: Low dose axial images, sagittal and coronal reformations were obtained from the thoracic inlet to the pubic symphysis following the IV contrast administration. Automatic exposure control is utilized to reduce patient radiation exposure. COMPARISON: None FINDINGS: The lungs are adequately aerated.  No pneumothorax is seen.  No pulmonary contusion is seen.  No pleural effusion is seen.  No infiltrate is seen. The thoracic aorta is normal in caliber.  No dissection or aneurysm is seen.  No retrosternal hematoma is seen. The abdominal aorta appears grossly unremarkable.  No dissection or posttraumatic changes are seen. The heart appears normal. The liver appears normal.  No liver mass or lesion is seen.  No evidence of liver laceration is seen. The gallbladder appears normal.  No gallstones are seen.. The spleen appears normal.  No splenic laceration is seen.  The pancreas appears grossly unremarkable.  No pancreatic mass or lesion is seen.  No inflammation is seen. No adrenal abnormality is seen.  No adrenal nodule is seen. The kidneys are well perfused.  No hydronephrosis is seen.  No hydroureter is seen.  No retroperitoneal hematoma is seen. Visualized portions of the bowel shows no acute abnormality.  No colitis is seen.  No diverticulitis is seen.  No colonic mass is seen. No free air is seen.  No free fluid is seen. Urinary bladder appears unremarkable.  The uterus is slightly heterogeneous and there findings consistent with uterine fibroids. No sternal fracture is seen.  No thoracic spine fracture is seen.  No lumbar spine fracture is seen.  No pelvic fracture is seen.  No rib fractures are seen.     No evidence of acute trauma Electronically signed by: Jarett Montesinos Date:    09/15/2023 Time:    16:13    CT Head Without Contrast    Result Date: 9/15/2023  EXAMINATION CT HEAD WITHOUT CONTRAST CLINICAL HISTORY Trauma; TECHNIQUE Axial non-contrast CT images of the head were acquired and multiplanar  reconstructions accomplished by a CT technologist at a separate workstation, pushed to PACS for physician review. COMPARISON None available at the time of the initial interpretation. FINDINGS Images were reviewed in subdural, brain, soft tissue, and bone windows. Exam quality: Motion/streak artifact limits assessment of the posterior fossa. Hemorrhage: No evidence of acute hyperattenuating blood products. Parenchyma: No discrete mass, localized mass-effect, or CT evidence of an acute territorial cortical-based ischemic insult. Gray-white differentiation is preserved. Midline shift: None. CSF spaces: Normal ventricle size and configuration. No extra-axial masses or expansile fluid collections. Vasculature: No hyperdense artery identified. No abnormal densities within the dural sinuses. Other findings: No abnormalities of the scalp or subjacent osseous structures. Mastoids are well aerated. No focal abnormality of the sella. The included facial structures are unremarkable. IMPRESSION No CT-evident acute intracranial abnormality. RADIATION DOSE Automated tube current modulation, weight-based exposure dosing, and/or iterative reconstruction technique utilized to reach lowest reasonably achievable exposure rate. DLP: 1380 mGy*cm Electronically signed by: Jase Arriaga Date:    09/15/2023 Time:    16:07    CT Cervical Spine Without Contrast    Result Date: 9/15/2023  EXAMINATION: CT CERVICAL SPINE WITHOUT CONTRAST CLINICAL HISTORY: Trauma; TECHNIQUE: Low dose axial images, sagittal and coronal reformations were performed though the cervical spine.  Contrast was not administered. Automatic exposure control is utilized to reduce patient radiation exposure. COMPARISON: None FINDINGS: The vertebral body heights are well maintained. There is some loss of the normal lordotic curve cervical spine most likely related to spasm. No fracture is seen. No dislocation is seen. The odontoid and lateral masses appear grossly unremarkable.   There are some degenerative changes seen in the cervical spine which appear to be chronic.     Loss of the normal lordotic curve of the cervical spine most likely related to spasm but otherwise unremarkable with no evidence of acute fracture or dislocation seen Incidental note is made of some degenerative changes in the  cervical spine Electronically signed by: Jarett Montesinos Date:    09/15/2023 Time:    16:06    X-Ray Ankle Complete Left    Result Date: 9/15/2023  EXAMINATION: XR ANKLE COMPLETE 3 VIEW LEFT CLINICAL HISTORY: Pain in unspecified ankle and joints of unspecified foot TECHNIQUE: AP, lateral and oblique views of the left ankle COMPARISON: None FINDINGS: Comminuted fracture of the tibial plafond with mild overall displacement.     Comminuted tibial plafond fracture. Electronically signed by: Claudy Mcgill Date:    09/15/2023 Time:    15:53    X-Ray Ankle Complete Right    Result Date: 9/15/2023  EXAMINATION: XR ANKLE COMPLETE 3 VIEW RIGHT CLINICAL HISTORY: Pain in unspecified ankle and joints of unspecified foot TECHNIQUE: AP, lateral, and oblique images of the right ankle COMPARISON: None FINDINGS: Oblique fracture through the distal tibia with anterior displacement and lateral angulation.  Mildly displaced fracture of the medial malleolus.  No definitive fibular fracture.     Fractures of the distal tibia and medial malleolus. Electronically signed by: Claudy Mcgill Date:    09/15/2023 Time:    15:51    X-Ray Pelvis Routine AP    Result Date: 9/15/2023  EXAMINATION XR PELVIS ROUTINE AP CLINICAL HISTORY r/o bleeding or hemorrhage; TECHNIQUE A total of 1 image(s) submitted of the pelvis. COMPARISON None available at the time of initial interpretation. FINDINGS No displaced fracture or dislocation is identified. The visualized pelvic ring structures and articular spaces are preserved. No evidence of a large joint effusion is appreciated. No aggressive osseous lesion or periosteal reaction is  appreciated. The trabecular pattern is unremarkable. The included soft tissues are without acute abnormality. IMPRESSION No convincing acute abnormality. Electronically signed by: Jase Arriaga Date:    09/15/2023 Time:    15:32    X-Ray Chest 1 View    Result Date: 9/15/2023  EXAMINATION XR CHEST 1 VIEW CLINICAL HISTORY r/o bleeding or hemorrhage; TECHNIQUE A total of 1 frontal image(s) of the chest. COMPARISON None available at the time of initial interpretation. FINDINGS Lines/tubes/devices: ECG leads overlie the imaged region. The cardiomediastinal silhouette and central pulmonary vasculature are unremarkable for utilized technique.  The trachea is midline. There is no lobar consolidation, pleural effusion, or pneumothorax. There is no acute osseous or extrathoracic abnormality. IMPRESSION No convincing acute radiographic abnormality. Electronically signed by: Jase Arriaga Date:    09/15/2023 Time:    15:32       Assessment/Plan:     Active Diagnoses:    Diagnosis Date Noted POA    Closed displaced pilon fracture of right tibia [S82.871A] 09/17/2023 Yes    Closed displaced pilon fracture of left tibia [S82.872A] 09/17/2023 Yes      Problems Resolved During this Admission:           Patient has bilateral distal tibia intra-articular fractures Pilon type.  These are devastating injuries and Singulair forearm but bilateral or even worse.  Patient has significant comminution of the joint left greater than the right.  Patient has significant swelling today.  We will take her to the OR for ex fix adjustment and partial open reduction internal fixation of the right side.  Her family does have previous history with me with her  on undergoing similar injury and surgery approximally 1 year ago.  These injuries are catastrophic debilitating injuries likely lead to posttraumatic arthritis possible need for fusion.  Along with the risks stated below.    I explained that surgery and the nature of their condition are not  without risks. These include, but are not limited to, bleeding, infection, neurovascular compromise, malunion, nonunion, hardware complications, wound complications, scarring, cosmetic defects, need for later and/or repeated surgeries, avascular necrosis, bone death due to initial trauma, pain, loss of ROM, loss of function, PTOA, deformity, stance/gait and/or functional abnormalities, thromboembolic complications, compartment syndrome, loss of limb, loss of life, anesthetic complications, and other imponderables. I explained that these can occur despite the adequacy of treatments rendered, and that their risks are heightened given the nature of their condition. They verbalized understanding. They would like to continue with surgery at this time. If appropriate family was involved with surgical discussion.         This note/OR report was created with the assistance of  voice recognition software or phone  dictation.  There may be transcription errors as a result of using this technology however minimal. Effort has been made to assure accuracy of transcription but any obvious errors or omissions should be clarified with the author of the document.       Ryan Humphrey, DO   Orthopedic Trauma Surgery  [facility]

## 2023-09-17 NOTE — ANESTHESIA POSTPROCEDURE EVALUATION
Anesthesia Post Evaluation    Patient: Janeen Arrieta    Procedure(s) Performed: Procedure(s) (LRB):  ORIF, FRACTURE, PILON (Right)  REVISION, OF EXTERNAL FIXATION (Left)    Final Anesthesia Type: general      Patient location during evaluation: PACU  Patient participation: Yes- Able to Participate  Level of consciousness: awake and alert and oriented  Post-procedure vital signs: reviewed and stable  Pain management: adequate  Airway patency: patent  LOTTIE mitigation strategies: Verification of full reversal of neuromuscular block  PONV status at discharge: No PONV  Anesthetic complications: no      Cardiovascular status: blood pressure returned to baseline and stable  Respiratory status: spontaneous ventilation and unassisted  Hydration status: euvolemic  Follow-up not needed.  Comments: [facility]          Vitals Value Taken Time   /86 09/17/23 1451   Temp  09/17/23 1743   Pulse 78 09/17/23 1456   Resp 16 09/17/23 1719   SpO2 99 % 09/17/23 1455   Vitals shown include unvalidated device data.      Event Time   Out of Recovery 09/17/2023 14:58:19         Pain/Jamal Score: Pain Rating Prior to Med Admin: 8 (9/17/2023  5:19 PM)  Pain Rating Post Med Admin: 4 (9/17/2023  7:11 AM)  Jamal Score: 8 (9/17/2023  2:52 PM)

## 2023-09-18 PROCEDURE — 94761 N-INVAS EAR/PLS OXIMETRY MLT: CPT

## 2023-09-18 PROCEDURE — 21400001 HC TELEMETRY ROOM

## 2023-09-18 PROCEDURE — 25000003 PHARM REV CODE 250: Performed by: PHYSICIAN ASSISTANT

## 2023-09-18 PROCEDURE — 63600175 PHARM REV CODE 636 W HCPCS

## 2023-09-18 PROCEDURE — 25000003 PHARM REV CODE 250

## 2023-09-18 RX ORDER — SODIUM CHLORIDE 0.9 % (FLUSH) 0.9 %
3 SYRINGE (ML) INJECTION
Status: DISCONTINUED | OUTPATIENT
Start: 2023-09-18 | End: 2023-09-28 | Stop reason: HOSPADM

## 2023-09-18 RX ORDER — SODIUM CHLORIDE 9 MG/ML
INJECTION, SOLUTION INTRAVENOUS CONTINUOUS
Status: DISCONTINUED | OUTPATIENT
Start: 2023-09-18 | End: 2023-09-28 | Stop reason: HOSPADM

## 2023-09-18 RX ADMIN — SENNOSIDES AND DOCUSATE SODIUM 2 TABLET: 50; 8.6 TABLET ORAL at 09:09

## 2023-09-18 RX ADMIN — OXYCODONE AND ACETAMINOPHEN 1 TABLET: 10; 325 TABLET ORAL at 05:09

## 2023-09-18 RX ADMIN — GABAPENTIN 300 MG: 300 CAPSULE ORAL at 09:09

## 2023-09-18 RX ADMIN — OXYCODONE AND ACETAMINOPHEN 1 TABLET: 10; 325 TABLET ORAL at 06:09

## 2023-09-18 RX ADMIN — ENOXAPARIN SODIUM 40 MG: 40 INJECTION SUBCUTANEOUS at 06:09

## 2023-09-18 RX ADMIN — FAMOTIDINE 20 MG: 20 TABLET, FILM COATED ORAL at 09:09

## 2023-09-18 RX ADMIN — OXYCODONE AND ACETAMINOPHEN 1 TABLET: 10; 325 TABLET ORAL at 01:09

## 2023-09-18 RX ADMIN — METHOCARBAMOL 750 MG: 750 TABLET ORAL at 09:09

## 2023-09-18 RX ADMIN — ALUMINUM HYDROXIDE, MAGNESIUM HYDROXIDE, AND SIMETHICONE 30 ML: 200; 200; 20 SUSPENSION ORAL at 05:09

## 2023-09-18 RX ADMIN — DOCUSATE SODIUM 200 MG: 100 CAPSULE, LIQUID FILLED ORAL at 09:09

## 2023-09-18 RX ADMIN — METHOCARBAMOL 750 MG: 750 TABLET ORAL at 02:09

## 2023-09-18 RX ADMIN — OXYCODONE AND ACETAMINOPHEN 1 TABLET: 10; 325 TABLET ORAL at 02:09

## 2023-09-18 RX ADMIN — CEFAZOLIN SODIUM 2 G: 2 SOLUTION INTRAVENOUS at 12:09

## 2023-09-18 RX ADMIN — CEFAZOLIN SODIUM 2 G: 2 SOLUTION INTRAVENOUS at 04:09

## 2023-09-18 RX ADMIN — OXYCODONE AND ACETAMINOPHEN 1 TABLET: 10; 325 TABLET ORAL at 11:09

## 2023-09-18 RX ADMIN — BISACODYL 10 MG: 10 SUPPOSITORY RECTAL at 05:09

## 2023-09-18 RX ADMIN — OXYCODONE AND ACETAMINOPHEN 1 TABLET: 10; 325 TABLET ORAL at 09:09

## 2023-09-18 NOTE — PT/OT/SLP PROGRESS
Physical Therapy      Patient Name:  Janeen Arrieta   MRN:  [id number]    PT attempted to see patient at approx. 900 and 1100 this morning, both times patient on bedpan. PT to follow up as schedule permits.

## 2023-09-18 NOTE — PLAN OF CARE
09/18/23 1338   Discharge Assessment   Assessment Type Discharge Planning Assessment   Confirmed/corrected address, phone number and insurance Yes   Confirmed Demographics Correct on Facesheet   Source of Information patient   Communicated PAULINE with patient/caregiver Date not available/Unable to determine   Reason For Admission R & L pilon fx s/p ex fix on L   People in Home grandchild(rod);spouse  (pt lives with her , Jovanny and 2 grand children, ages 2 & 5y/o in a single story home with a ramp)   Do you expect to return to your current living situation? Yes   Do you have help at home or someone to help you manage your care at home? Yes   Who are your caregiver(s) and their phone number(s)? Pt's , jovanny who is disabled so he does not work   Prior to hospitilization cognitive status: Alert/Oriented   Current cognitive status: Alert/Oriented   Walking or Climbing Stairs   (independent with mobiltiy)   Home Layout Able to live on 1st floor   Equipment Currently Used at Home none   Readmission within 30 days? No   Patient currently being followed by outpatient case management? No   Do you currently have service(s) that help you manage your care at home? No   Who is going to help you get home at discharge? , Jovanny   How do you get to doctors appointments? car, drives self   Are you on dialysis? No   DME Needed Upon Discharge  other (see comments)  (TBD)   Discharge Plan discussed with: Patient   Transition of Care Barriers None   Discharge Plan A Rehab   Discharge Plan B Home Health   Housing Stability   In the last 12 months, was there a time when you were not able to pay the mortgage or rent on time? N   Transportation Needs   In the past 12 months, has lack of transportation kept you from medical appointments or from getting medications? no   Food Insecurity   Within the past 12 months, you worried that your food would run out before you got the money to buy more. Never true   OTHER    Name(s) of People in Home , Jovanny and 2 grand children, Reye'Lynn and Mynor Cardenas     Pt's PCP is Dr Norman in [place]. Pt's  is her , Jovanny ([phone]). Pt has never had HH services. Pt uses [facility] in [place]. Pt does drive but does not work. Financial counselor spoke with pt re medicaid. CM to follow

## 2023-09-19 PROCEDURE — 25000003 PHARM REV CODE 250

## 2023-09-19 PROCEDURE — 63600175 PHARM REV CODE 636 W HCPCS: Performed by: PHYSICIAN ASSISTANT

## 2023-09-19 PROCEDURE — 21400001 HC TELEMETRY ROOM

## 2023-09-19 PROCEDURE — 63600175 PHARM REV CODE 636 W HCPCS

## 2023-09-19 PROCEDURE — 25000003 PHARM REV CODE 250: Performed by: PHYSICIAN ASSISTANT

## 2023-09-19 PROCEDURE — 97530 THERAPEUTIC ACTIVITIES: CPT

## 2023-09-19 RX ORDER — OXYCODONE HYDROCHLORIDE 10 MG/1
10 TABLET ORAL EVERY 4 HOURS PRN
Status: DISCONTINUED | OUTPATIENT
Start: 2023-09-19 | End: 2023-09-28 | Stop reason: HOSPADM

## 2023-09-19 RX ORDER — ACETAMINOPHEN 10 MG/ML
1000 INJECTION, SOLUTION INTRAVENOUS ONCE
Status: COMPLETED | OUTPATIENT
Start: 2023-09-19 | End: 2023-09-19

## 2023-09-19 RX ORDER — KETOROLAC TROMETHAMINE 30 MG/ML
15 INJECTION, SOLUTION INTRAMUSCULAR; INTRAVENOUS EVERY 6 HOURS
Status: COMPLETED | OUTPATIENT
Start: 2023-09-19 | End: 2023-09-20

## 2023-09-19 RX ORDER — OXYCODONE HYDROCHLORIDE 5 MG/1
5 TABLET ORAL EVERY 6 HOURS PRN
Status: DISCONTINUED | OUTPATIENT
Start: 2023-09-19 | End: 2023-09-28 | Stop reason: HOSPADM

## 2023-09-19 RX ADMIN — GABAPENTIN 300 MG: 300 CAPSULE ORAL at 10:09

## 2023-09-19 RX ADMIN — ACETAMINOPHEN 1000 MG: 10 INJECTION, SOLUTION INTRAVENOUS at 01:09

## 2023-09-19 RX ADMIN — OXYCODONE HYDROCHLORIDE 10 MG: 10 TABLET ORAL at 01:09

## 2023-09-19 RX ADMIN — METHOCARBAMOL 750 MG: 750 TABLET ORAL at 08:09

## 2023-09-19 RX ADMIN — Medication 6 MG: at 10:09

## 2023-09-19 RX ADMIN — SENNOSIDES AND DOCUSATE SODIUM 2 TABLET: 50; 8.6 TABLET ORAL at 10:09

## 2023-09-19 RX ADMIN — ENOXAPARIN SODIUM 40 MG: 40 INJECTION SUBCUTANEOUS at 04:09

## 2023-09-19 RX ADMIN — OXYCODONE HYDROCHLORIDE 10 MG: 10 TABLET ORAL at 06:09

## 2023-09-19 RX ADMIN — FAMOTIDINE 20 MG: 20 TABLET, FILM COATED ORAL at 08:09

## 2023-09-19 RX ADMIN — OXYCODONE AND ACETAMINOPHEN 1 TABLET: 10; 325 TABLET ORAL at 08:09

## 2023-09-19 RX ADMIN — OXYCODONE HYDROCHLORIDE 10 MG: 10 TABLET ORAL at 10:09

## 2023-09-19 RX ADMIN — METHOCARBAMOL 750 MG: 750 TABLET ORAL at 10:09

## 2023-09-19 RX ADMIN — OXYCODONE HYDROCHLORIDE AND ACETAMINOPHEN 1 TABLET: 5; 325 TABLET ORAL at 03:09

## 2023-09-19 RX ADMIN — FAMOTIDINE 20 MG: 20 TABLET, FILM COATED ORAL at 10:09

## 2023-09-19 RX ADMIN — KETOROLAC TROMETHAMINE 15 MG: 30 INJECTION, SOLUTION INTRAMUSCULAR at 07:09

## 2023-09-19 RX ADMIN — POLYETHYLENE GLYCOL 3350 17 G: 17 POWDER, FOR SOLUTION ORAL at 10:09

## 2023-09-19 RX ADMIN — SODIUM CHLORIDE 500 ML: 9 INJECTION, SOLUTION INTRAVENOUS at 01:09

## 2023-09-19 NOTE — PLAN OF CARE
Problem: Adult Inpatient Plan of Care  Goal: Plan of Care Review  Outcome: Ongoing, Progressing  Goal: Patient-Specific Goal (Individualized)  Outcome: Ongoing, Progressing  Goal: Absence of Hospital-Acquired Illness or Injury  Outcome: Ongoing, Progressing  Goal: Optimal Comfort and Wellbeing  Outcome: Ongoing, Progressing  Goal: Readiness for Transition of Care  Outcome: Ongoing, Progressing     Problem: Impaired Wound Healing  Goal: Optimal Wound Healing  Outcome: Ongoing, Progressing     Problem: Infection  Goal: Absence of Infection Signs and Symptoms  Outcome: Ongoing, Progressing     Problem: Skin Injury Risk Increased  Goal: Skin Health and Integrity  Outcome: Ongoing, Progressing

## 2023-09-19 NOTE — PT/OT/SLP PROGRESS
Physical Therapy Treatment    Patient Name:  Janeen Arrieta   MRN:  [id number]    Recommendations:     Discharge Recommendations: home health PT  Discharge Equipment Recommendations: slide board, drop arm commode, bath bench  Barriers to discharge: None    Assessment:     Janeen Arrieta is a 47 y.o. female admitted with a medical diagnosis of <principal problem not specified>.  She presents with the following impairments/functional limitations: weakness, impaired endurance, impaired functional mobility, decreased lower extremity function, pain . Pt is doing well with transfers using a slide borad. I feel she could go home with  who just built a ramp and they already have a wc. Pt would need HHPT and when she is learning to walk again,she will need more aggressive therapy..    Rehab Prognosis: Good; patient would benefit from acute skilled PT services to address these deficits and reach maximum level of function.    Recent Surgery: Procedure(s) (LRB):  ORIF, FRACTURE, PILON (Right)  REVISION, OF EXTERNAL FIXATION (Left) 2 Days Post-Op    Plan:     During this hospitalization, patient to be seen 6 x/week to address the identified rehab impairments via therapeutic activities, therapeutic exercises, wheelchair management/training and progress toward the following goals:    Plan of Care Expires:  10/16/23    Subjective     Chief Complaint: leg pain janis  Patient/Family Comments/goals:   Pain/Comfort:         Objective:     Communicated with nurse prior to session.  Patient found supine with peripheral IV, PureWick upon PT entry to room.     General Precautions: Standard, fall  Orthopedic Precautions:  (BLE NWB)  Braces:    Respiratory Status: Room air  Blood Pressure:   Skin Integrity: Visible skin intact      Functional Mobility:  Pt went sit to supine with catarino and supine to sit with sba. Pt then practiced transfers using a slide board wc to bed and bed to wc with cga/vaishali. Pt did these transfers several times, and  Pseudophakia OD. really just needed help with initial placement of SB followed by holding the WC in place while she performs transfer    Therapeutic Activities/Exercises:      Education:  Patient provided with verbal education education regarding transfer safety.  Patient was able to return demonstration.     Patient left supine with all lines intact and call button in reach..    GOALS:   Multidisciplinary Problems       Physical Therapy Goals          Problem: Physical Therapy    Goal Priority Disciplines Outcome Goal Variances Interventions   Physical Therapy Goal     PT, PT/OT Ongoing, Progressing     Description: Goals to be met by: 10/16/23     Patient will increase functional independence with mobility by performin. Supine to sit with [place]  2. Bed to chair transfer mod Ind via slide board  3. Pt Independent with WC mobility using BILL UE propulsion x 300ft.                         Time Tracking:     PT Received On:    PT Start Time: 1534     PT Stop Time: 1603  PT Total Time (min): 29 min     Billable Minutes: Therapeutic Activity 29    Treatment Type: Treatment  PT/PTA: PT     Number of PTA visits since last PT visit: 2023

## 2023-09-19 NOTE — PROGRESS NOTES
[facility] - Ortho Neuro  Orthopedics  Progress Note    Patient Name: Janeen Arrieta  MRN: [id number]  Admission Date: 9/15/2023  Hospital Length of Stay: 4 days  Attending Provider: Ryan Humphrey DO  Primary Care Provider: Andrea Norman MD  Follow-up For: Procedure(s) (LRB):  ORIF, FRACTURE, PILON (Right)  REVISION, OF EXTERNAL FIXATION (Left)    Post-Operative Day: 2 Days Post-Op  Subjective:     Principal Problem:<principal problem not specified>    Principal Orthopedic Problem: 2 Days Post-Op   ORIF right pilon ankle fracture. POD 4 ex fix left pilon- pending operative stabilization.    Interval History: Patient POD 2 ORIF right pilon fx. Left pilon fx remains in ex fix. Elevated on wedge. She is having some pain post op and discomfort as expected. No numbness or tingling distally tot he bilateral lower extremities. States feels her dressing is tight to the RLE today. Will remain NWB to the bilateral lower ext. Applied for medicaid last week just prior to her injury unfortunately her  is a patient of ours and has lost his insurance due to his injury from last year. Hopeful for medicaid approval soon.    Review of patient's allergies indicates:  No Known Allergies    Current Facility-Administered Medications   Medication    0.9%  NaCl infusion    0.9%  NaCl infusion    acetaminophen tablet 650 mg    aluminum-magnesium hydroxide-simethicone 200-200-20 mg/5 mL suspension 30 mL    docusate sodium capsule 200 mg    electrolyte-A infusion    electrolyte-A infusion    enoxaparin injection 40 mg    famotidine tablet 20 mg    gabapentin capsule 300 mg    lactated ringers infusion    lactulose 20 gram/30 mL solution Soln 20 g    melatonin tablet 6 mg    methocarbamoL tablet 750 mg    ondansetron injection 4 mg    ondansetron injection 4 mg    oxyCODONE-acetaminophen  mg per tablet 1 tablet    oxyCODONE-acetaminophen 5-325 mg per tablet 1 tablet    polyethylene glycol packet 17 g     "senna-docusate 8.6-50 mg per tablet 2 tablet    sodium chloride 0.9% flush 10 mL    sodium chloride 0.9% flush 3 mL     Objective:     Vital Signs (Most Recent):  Temp: 98.4 °F (36.9 °C) (09/18/23 2300)  Pulse: 95 (09/19/23 0712)  Resp: 18 (09/19/23 0823)  BP: (!) 149/75 (09/19/23 0712)  SpO2: (!) 93 % (09/19/23 0712) Vital Signs (24h Range):  Temp:  [98.4 °F (36.9 °C)-98.6 °F (37 °C)] 98.4 °F (36.9 °C)  Pulse:  [] 95  Resp:  [17-18] 18  SpO2:  [93 %-96 %] 93 %  BP: (127-149)/(72-76) 149/75     Weight: 93.4 kg (206 lb)  Height: 5' 4.02" (162.6 cm)  Body mass index is 35.34 kg/m².      Intake/Output Summary (Last 24 hours) at 9/19/2023 1121  Last data filed at 9/19/2023 0640  Gross per 24 hour   Intake 148.36 ml   Output 750 ml   Net -601.64 ml       Physical Exam:   Musculoskeletal:       Right lower extremity: Dressing CDI without obvious drainage; compartments are soft and compressible; Tolerates gentle passive ROM at the ankle and knee, moderate swelling- CAM boot removed for comfort; appropriate tenderness to palpation; dorsi/plantar flexes the foot; SILT distally; BCR distally; DP pulse palpable    Left lower extremity: Dressing CDI with mild drainage from pin sites; compartments are soft and compressible; No ankle ROM attempted, able to flex and extend all digits; appropriate tenderness to palpation; dorsi/plantar flexes the foot; SILT distally; BCR distally; DP pulse palpable      Diagnostic Findings:   Significant Labs:   Recent Lab Results         09/17/23  0510   09/16/23 2258 09/16/23 2257        Phencyclidine   Negative         Amphetamine Screen, Ur   Negative         Anion Gap 6.0           Appearance, UA     Clear       Bacteria, UA     None Seen       Barbiturate Screen, Ur   Negative         Benzodiazepine Screen, Urine   Positive         Bilirubin, UA     Negative       BUN 12.7           BUN/CREAT RATIO 16           Calcium 8.4           Cannabinoids, Urine   Negative         Chloride " 109           CO2 27           Cocaine (Metab.)   Negative         Color, UA     Yellow       Creatinine 0.80           eGFR >60           Fentanyl, Urine   Positive         Glucose 95           Glucose, UA     Negative       Hematocrit 32.6           Hemoglobin 10.8           Ketones, UA     Negative       Leukocytes, UA     Negative       MCH 28.6           MCHC 33.1           MCV 86.2           MDMA, Urine   Negative         MPV 10.5           NITRITE UA     Negative       nRBC 0.0           Occult Blood UA     Negative       Opiate Scrn, Ur   Positive         pH, UA     6.0       pH, Urine   6.0         Platelets 222           Potassium 4.0           Protein, UA     Negative       RBC 3.78           RBC, UA     <5       RDW 14.4           Sodium 142           Specific Gravity,UA     1.009       Specific Gravity, Urine Auto   1.009         Squam Epithel, UA     <5       Urobilinogen, UA     0.2       WBC, UA     <5       WBC 8.52                    Significant Imaging: I have reviewed and interpreted all pertinent imaging results/findings.     Assessment/Plan:     Active Diagnoses:    Diagnosis Date Noted POA    Closed displaced pilon fracture of right tibia [S82.871A] 09/17/2023 Yes    Closed displaced pilon fracture of left tibia [S82.872A] 09/17/2023 Yes      Problems Resolved During this Admission:   H&H stable this morning. Doing okay overall.  Will continue multimodal pain control.   Daily dry dressing changes to RLE as needed beginning today. Okay to re-wrap kerlex for pin sites to LLE. NWB BLE, ROMAT RLE, No ROM LLE until definitive fixation. Hopeful for swelling to be resolved later this week/early next week for staging of the left pilon.  Lovenox for DVT ppx during stay and x 90 days upon discharge.   Appreciate CM help with her medicaid application and likely rehab needs following this hospital stay.   We will continue to monitor her daily.  Progress diet as tolerated      The above findings,  diagnostics, and treatment plan were discussed with Dr. Humphrey who is in agreement with the plan of care except as stated in additional documentation.      Marli Rossi PA-C  Orthopedic Trauma Surgery  [facility]

## 2023-09-20 PROCEDURE — 63600175 PHARM REV CODE 636 W HCPCS: Performed by: PHYSICIAN ASSISTANT

## 2023-09-20 PROCEDURE — 63600175 PHARM REV CODE 636 W HCPCS

## 2023-09-20 PROCEDURE — 25000003 PHARM REV CODE 250: Performed by: PHYSICIAN ASSISTANT

## 2023-09-20 PROCEDURE — 97530 THERAPEUTIC ACTIVITIES: CPT | Mod: CQ

## 2023-09-20 PROCEDURE — 25000003 PHARM REV CODE 250

## 2023-09-20 PROCEDURE — 21400001 HC TELEMETRY ROOM

## 2023-09-20 PROCEDURE — 94761 N-INVAS EAR/PLS OXIMETRY MLT: CPT

## 2023-09-20 RX ADMIN — SENNOSIDES AND DOCUSATE SODIUM 2 TABLET: 50; 8.6 TABLET ORAL at 09:09

## 2023-09-20 RX ADMIN — FAMOTIDINE 20 MG: 20 TABLET, FILM COATED ORAL at 09:09

## 2023-09-20 RX ADMIN — ENOXAPARIN SODIUM 40 MG: 40 INJECTION SUBCUTANEOUS at 05:09

## 2023-09-20 RX ADMIN — KETOROLAC TROMETHAMINE 15 MG: 30 INJECTION, SOLUTION INTRAMUSCULAR at 05:09

## 2023-09-20 RX ADMIN — OXYCODONE HYDROCHLORIDE 10 MG: 10 TABLET ORAL at 09:09

## 2023-09-20 RX ADMIN — DOCUSATE SODIUM 200 MG: 100 CAPSULE, LIQUID FILLED ORAL at 09:09

## 2023-09-20 RX ADMIN — METHOCARBAMOL 750 MG: 750 TABLET ORAL at 09:09

## 2023-09-20 RX ADMIN — KETOROLAC TROMETHAMINE 15 MG: 30 INJECTION, SOLUTION INTRAMUSCULAR at 12:09

## 2023-09-20 RX ADMIN — Medication 6 MG: at 09:09

## 2023-09-20 RX ADMIN — OXYCODONE HYDROCHLORIDE 10 MG: 10 TABLET ORAL at 05:09

## 2023-09-20 RX ADMIN — OXYCODONE HYDROCHLORIDE 10 MG: 10 TABLET ORAL at 02:09

## 2023-09-20 RX ADMIN — OXYCODONE HYDROCHLORIDE 10 MG: 10 TABLET ORAL at 04:09

## 2023-09-20 RX ADMIN — GABAPENTIN 300 MG: 300 CAPSULE ORAL at 09:09

## 2023-09-20 RX ADMIN — METHOCARBAMOL 750 MG: 750 TABLET ORAL at 02:09

## 2023-09-20 NOTE — CARE UPDATE
[id number] Therapy is recommending pt have a slide board and a drop arm BSC. Called [employer] and obtained prices for this dme: slide board is $30+tax and drop arm BSC=$138.88+tax. Informed pt. Pt asked me about the medicaid. I will have financial counselor to speak with pt.

## 2023-09-20 NOTE — PT/OT/SLP PROGRESS
Physical Therapy Treatment    Patient Name:  Janeen Arrieta   MRN:  [id number]    Recommendations:     Discharge Recommendations: home health PT  Discharge Equipment Recommendations: slide board, drop arm commode, bath bench  Barriers to discharge: Impaired mobility    Assessment:     Jnaeen Arrieta is a 47 y.o. female admitted with a medical diagnosis of <principal problem not specified>.  She presents with the following impairments/functional limitations: weakness, impaired endurance, impaired functional mobility, decreased lower extremity function, pain.    Rehab Prognosis: Good; patient would benefit from acute skilled PT services to address these deficits and reach maximum level of function.    Recent Surgery: Procedure(s) (LRB):  ORIF, FRACTURE, PILON (Right)  REVISION, OF EXTERNAL FIXATION (Left) 3 Days Post-Op    Plan:     During this hospitalization, patient to be seen 6 x/week to address the identified rehab impairments via therapeutic activities, therapeutic exercises, wheelchair management/training and progress toward the following goals:    Plan of Care Expires:  10/16/23    Subjective     Chief Complaint:   Patient/Family Comments/goals:   Pain/Comfort:         Objective:     Communicated with nursing prior to session.  Patient found HOB elevated with peripheral IV, PureWick upon PT entry to room.     General Precautions: Standard, fall  Orthopedic Precautions: RLE non weight bearing, LLE non weight bearing  Braces:    Respiratory Status: Room air    Functional Mobility:  Bed Mobility:     Rolling Left:  stand by assistance  Rolling Right: stand by assistance  Supine to Sit: stand by assistance  Sit to Supine: stand by assistance  Transfers:     Bed <> wheel chair: minimum assistance with  slide board  using  Slide Board  Pt. Needed assistance with slide board placement/removal & holding WC in place  Wheelchair Propulsion:  Pt propelled Standard wheelchair x 200 feet on Level tile with  Bilateral upper  extremity with Stand-by Assistance.   VC & demo for wheel management    Patient left HOB elevated with all lines intact and call button in reach..    GOALS:   Multidisciplinary Problems       Physical Therapy Goals          Problem: Physical Therapy    Goal Priority Disciplines Outcome Goal Variances Interventions   Physical Therapy Goal     PT, PT/OT Ongoing, Progressing     Description: Goals to be met by: 10/16/23     Patient will increase functional independence with mobility by performin. Supine to sit with [place]  2. Bed to chair transfer mod Ind via slide board  3. Pt Independent with WC mobility using BILL UE propulsion x 300ft.                         Time Tracking:     PT Received On: 23  PT Start Time: 1428     PT Stop Time: 1455  PT Total Time (min): 27 min     Billable Minutes: Therapeutic Activity 27    Treatment Type: Treatment  PT/PTA: PTA     Number of PTA visits since last PT visit: 2     2023

## 2023-09-20 NOTE — PROGRESS NOTES
[facility] - Orth[facility]  Orthopedics  Progress Note    Patient Name: Janeen Arrieta  MRN: [id number]  Admission Date: 9/15/2023  Hospital Length of Stay: 5 days  Attending Provider: Ryan Humphrey DO  Primary Care Provider: Andrea Norman MD  Follow-up For: Procedure(s) (LRB):  ORIF, FRACTURE, PILON (Right)  REVISION, OF EXTERNAL FIXATION (Left)    Post-Operative Day: 3 Days Post-Op  Subjective:     Principal Problem:<principal problem not specified>    Principal Orthopedic Problem: 3 Days Post-Op   ORIF right pilon ankle fracture. POD 4 ex fix left pilon- pending operative stabilization.    Interval History: Patient POD 3 ORIF right pilon fx. Left pilon fx remains in ex fix. Elevated on wedge. Pain is improved overall. No numbness or tingling distally tot he bilateral lower extremities. Will remain NWB to the bilateral lower ext. Applied for medicaid last week just prior to her injury unfortunately her  is a patient of ours and has lost his insurance due to his injury from last year. Hopeful for medicaid approval soon.    Review of patient's allergies indicates:  No Known Allergies    Current Facility-Administered Medications   Medication    0.9%  NaCl infusion    0.9%  NaCl infusion    acetaminophen tablet 650 mg    aluminum-magnesium hydroxide-simethicone 200-200-20 mg/5 mL suspension 30 mL    docusate sodium capsule 200 mg    electrolyte-A infusion    electrolyte-A infusion    enoxaparin injection 40 mg    famotidine tablet 20 mg    gabapentin capsule 300 mg    ketorolac injection 15 mg    lactated ringers infusion    lactulose 20 gram/30 mL solution Soln 20 g    melatonin tablet 6 mg    methocarbamoL tablet 750 mg    ondansetron injection 4 mg    ondansetron injection 4 mg    oxyCODONE immediate release tablet 5 mg    oxyCODONE immediate release tablet Tab 10 mg    polyethylene glycol packet 17 g    senna-docusate 8.6-50 mg per tablet 2 tablet    sodium chloride 0.9% flush 10 mL     "sodium chloride 0.9% flush 3 mL     Objective:     Vital Signs (Most Recent):  Temp: 98 °F (36.7 °C) (09/20/23 1035)  Pulse: 73 (09/20/23 1035)  Resp: 18 (09/20/23 1035)  BP: 103/68 (09/20/23 1035)  SpO2: 96 % (09/20/23 1035) Vital Signs (24h Range):  Temp:  [97.7 °F (36.5 °C)-98.4 °F (36.9 °C)] 98 °F (36.7 °C)  Pulse:  [73-96] 73  Resp:  [17-20] 18  SpO2:  [94 %-97 %] 96 %  BP: (103-145)/(68-86) 103/68     Weight: 93.4 kg (206 lb)  Height: 5' 4.02" (162.6 cm)  Body mass index is 35.34 kg/m².      Intake/Output Summary (Last 24 hours) at 9/20/2023 1243  Last data filed at 9/20/2023 0719  Gross per 24 hour   Intake --   Output 2600 ml   Net -2600 ml         Physical Exam:   Musculoskeletal:       Right lower extremity: Dressing CDI without obvious drainage; compartments are soft and compressible; Tolerates gentle passive ROM at the ankle and knee, moderate swelling- CAM boot removed for comfort; appropriate tenderness to palpation; dorsi/plantar flexes the foot; SILT distally; BCR distally; DP pulse palpable    Left lower extremity: Dressing CDI with mild drainage from pin sites; compartments are soft and compressible; No ankle ROM attempted, able to flex and extend all digits; appropriate tenderness to palpation; dorsi/plantar flexes the foot; SILT distally; BCR distally; DP pulse palpable      Diagnostic Findings:   Significant Labs:   Recent Lab Results       None             Significant Imaging: I have reviewed and interpreted all pertinent imaging results/findings.     Assessment/Plan:     Active Diagnoses:    Diagnosis Date Noted POA    Closed displaced pilon fracture of right tibia [S82.871A] 09/17/2023 Yes    Closed displaced pilon fracture of left tibia [S82.872A] 09/17/2023 Yes      Problems Resolved During this Admission:   H&H stable this morning. Doing okay overall.  Will continue multimodal pain control.   Daily dry dressing changes to RLE as needed beginning today. Okay to re-wrap kerlex for pin sites " to LLE. NWB BLE, ROMAT RLE, No ROM LLE until definitive fixation. Hopeful for swelling to be resolved later this week/early next week for staging of the left pilon.  CT scan re-evaluated for surgical planning. Likely hindfoot fusion nail to the LLE with removal of external fixator with formal fusion to follow this as another procedure after period of healing.  Lovenox for DVT ppx during stay and x 90 days upon discharge.   Appreciate CM help with her medicaid application and likely rehab needs following this hospital stay.   We will continue to monitor her daily.  Progress diet as tolerated. NPO at midnight Friday night.   Will continue to follow through her stay.      The above findings, diagnostics, and treatment plan were discussed with Dr. Humphrey who is in agreement with the plan of care except as stated in additional documentation.      Marli Rossi PA-C  Orthopedic Trauma Surgery  [facility]

## 2023-09-21 PROBLEM — E44.0 MODERATE MALNUTRITION: Status: ACTIVE | Noted: 2023-09-21

## 2023-09-21 PROCEDURE — 25000003 PHARM REV CODE 250

## 2023-09-21 PROCEDURE — 63600175 PHARM REV CODE 636 W HCPCS

## 2023-09-21 PROCEDURE — 21400001 HC TELEMETRY ROOM

## 2023-09-21 PROCEDURE — 25000003 PHARM REV CODE 250: Performed by: PHYSICIAN ASSISTANT

## 2023-09-21 PROCEDURE — 97530 THERAPEUTIC ACTIVITIES: CPT | Mod: CQ

## 2023-09-21 RX ADMIN — FAMOTIDINE 20 MG: 20 TABLET, FILM COATED ORAL at 08:09

## 2023-09-21 RX ADMIN — METHOCARBAMOL 750 MG: 750 TABLET ORAL at 08:09

## 2023-09-21 RX ADMIN — OXYCODONE HYDROCHLORIDE 10 MG: 10 TABLET ORAL at 08:09

## 2023-09-21 RX ADMIN — GABAPENTIN 300 MG: 300 CAPSULE ORAL at 08:09

## 2023-09-21 RX ADMIN — OXYCODONE HYDROCHLORIDE 10 MG: 10 TABLET ORAL at 02:09

## 2023-09-21 RX ADMIN — METHOCARBAMOL 750 MG: 750 TABLET ORAL at 02:09

## 2023-09-21 RX ADMIN — SENNOSIDES AND DOCUSATE SODIUM 2 TABLET: 50; 8.6 TABLET ORAL at 08:09

## 2023-09-21 RX ADMIN — Medication 6 MG: at 08:09

## 2023-09-21 RX ADMIN — DOCUSATE SODIUM 200 MG: 100 CAPSULE, LIQUID FILLED ORAL at 08:09

## 2023-09-21 RX ADMIN — OXYCODONE HYDROCHLORIDE 10 MG: 10 TABLET ORAL at 05:09

## 2023-09-21 RX ADMIN — OXYCODONE HYDROCHLORIDE 10 MG: 10 TABLET ORAL at 10:09

## 2023-09-21 RX ADMIN — POLYETHYLENE GLYCOL 3350 17 G: 17 POWDER, FOR SOLUTION ORAL at 08:09

## 2023-09-21 RX ADMIN — ENOXAPARIN SODIUM 40 MG: 40 INJECTION SUBCUTANEOUS at 06:09

## 2023-09-21 NOTE — PROGRESS NOTES
Inpatient Nutrition Assessment    Admit Date: 9/15/2023   Total duration of encounter: 6 days     Nutrition Recommendation/Prescription     Continue regular diet as tolerated  If appetite poor, consider oral supplement  RD to monitor po intake and weight    Communication of Recommendations: reviewed with patient    Nutrition Assessment     Malnutrition Assessment/Nutrition-Focused Physical Exam    Malnutrition Context: acute illness or injury  Malnutrition Level: moderate  Energy Intake (Malnutrition): less than 75% for greater than 7 days  Weight Loss (Malnutrition):  (does not meet criteria)  Subcutaneous Fat (Malnutrition): moderate depletion  Orbital Region (Subcutaneous Fat Loss): moderate depletion        Muscle Mass (Malnutrition): moderate depletion  [place] Region (Muscle Loss): moderate depletion                                A minimum of two characteristics is recommended for diagnosis of either severe or non-severe malnutrition.    Chart Review    Reason Seen: length of stay    Malnutrition Screening Tool Results   Have you recently lost weight without trying?: No  Have you been eating poorly because of a decreased appetite?: No   MST Score: 0     Diagnosis:  Closed displaced pilon fracture of right tibia s/p ORIF POD 3  Closed displaced pilon fracture of left tibia s/p ex fix POD 4    Relevant Medical History: n/a    Nutrition-Related Medications: colace, Pepcid, miralax, senna  Calorie Containing IV Medications: no significant kcals from medications at this time    Nutrition-Related Labs: no new labs      Diet/PN Order: Diet Adult Regular  Oral Supplement Order: none  Tube Feeding Order: none  Appetite/Oral Intake: fair/50-75% of meals  Factors Affecting Nutritional Intake: decreased appetite  Food/Mandaeism/Cultural Preferences: none reported  Food Allergies: none reported    Skin Integrity: bruised (ecchymotic)  Wound(s):       Comments    9/21: pt reports fair appetite since admission, as well as  "prior for unknown amount of time. Pt states was eating 2 meals/day d/t being busy a lot lately. Denied need for ONS at this time. Encouraged pt to order food based on food preferences to increase po intake. No GI issues reported. UBW reported ~206 lb with few lb weight loss prior.    Anthropometrics    Height: 5' 4.02" (162.6 cm) Height Method: Stated  Last Weight: 93.4 kg (206 lb) (23 0735) Weight Method: Bed Scale  BMI (Calculated): 35.3  BMI Classification: obese grade II (BMI 35-39.9)     Ideal Body Weight (IBW), Female: 120.1 lb     % Ideal Body Weight, Female (lb): 171.52 %                    Usual Body Weight (UBW), k.4 kg  % Usual Body Weight: 100.25     Usual Weight Provided By: patient    Wt Readings from Last 5 Encounters:   23 93.4 kg (206 lb)     Weight Change(s) Since Admission:  Admit Weight: 93.4 kg (206 lb) (09/15/23 1458)      Estimated Needs    Weight Used For Calorie Calculations: 93.4 kg (205 lb 14.6 oz)  Energy Calorie Requirements (kcal): 1853 kcal (1.2 stress factor)  Energy Need Method: [place]-St Jeor  Weight Used For Protein Calculations: 93.4 kg (205 lb 14.6 oz)  Protein Requirements:  g (1.0-1.2 g/kg)  Fluid Requirements (mL): 1853 mL (1 mL/kcal)  Temp (24hrs), Av.9 °F (36.6 °C), Min:97.5 °F (36.4 °C), Max:98.1 °F (36.7 °C)       Enteral Nutrition    Patient not receiving enteral nutrition at this time.    Parenteral Nutrition    Patient not receiving parenteral nutrition support at this time.    Evaluation of Received Nutrient Intake    Calories: not meeting estimated needs  Protein: not meeting estimated needs    Patient Education    Not applicable.    Nutrition Diagnosis     PES: Malnutrition related to acute illness as evidenced by moderate muscle/fat depletion and <75% energy intake for > 7 days. (new)    Interventions/Goals     Intervention(s): general/healthful diet, commercial beverage, and collaboration with other providers  Goal: Meet greater than 75% " of nutritional needs by follow-up. (new)    Monitoring & Evaluation     Dietitian will monitor energy intake and weight.  Nutrition Risk/Follow-Up: moderate (follow-up in 3-5 days)   Please consult if re-assessment needed sooner.

## 2023-09-21 NOTE — PLAN OF CARE
Pt has ex fix on L tibia and will return to surgery for this limb on Saturday 9/23  Remains self pay  Will follow up on Monday

## 2023-09-21 NOTE — PROGRESS NOTES
[facility] - Ortho Neuro  Orthopedics  Progress Note    Patient Name: Janeen Arrieta  MRN: [id number]  Admission Date: 9/15/2023  Hospital Length of Stay: 6 days  Attending Provider: Ryan Humphrey DO  Primary Care Provider: Andrea Norman MD  Follow-up For: Procedure(s) (LRB):  ORIF, FRACTURE, PILON (Right)  REVISION, OF EXTERNAL FIXATION (Left)    Post-Operative Day: 3 Days Post-Op  Subjective:     Principal Problem:<principal problem not specified>    Principal Orthopedic Problem: 4 Days Post-Op   ORIF right pilon ankle fracture. POD 4 ex fix left pilon- pending operative stabilization.    Interval History: Patient POD 4 ORIF right pilon fx. Left pilon fx remains in ex fix. Elevated on wedge. Pain is improved overall. No numbness or tingling distally. Swelling has improved.    Review of patient's allergies indicates:  No Known Allergies    Current Facility-Administered Medications   Medication    0.9%  NaCl infusion    0.9%  NaCl infusion    acetaminophen tablet 650 mg    aluminum-magnesium hydroxide-simethicone 200-200-20 mg/5 mL suspension 30 mL    docusate sodium capsule 200 mg    electrolyte-A infusion    electrolyte-A infusion    enoxaparin injection 40 mg    famotidine tablet 20 mg    gabapentin capsule 300 mg    lactated ringers infusion    lactulose 20 gram/30 mL solution Soln 20 g    melatonin tablet 6 mg    methocarbamoL tablet 750 mg    ondansetron injection 4 mg    ondansetron injection 4 mg    oxyCODONE immediate release tablet 5 mg    oxyCODONE immediate release tablet Tab 10 mg    polyethylene glycol packet 17 g    senna-docusate 8.6-50 mg per tablet 2 tablet    sodium chloride 0.9% flush 10 mL    sodium chloride 0.9% flush 3 mL     Objective:     Vital Signs (Most Recent):  Temp: 97.9 °F (36.6 °C) (09/21/23 1438)  Pulse: 77 (09/21/23 1438)  Resp: 17 (09/21/23 1457)  BP: 122/73 (09/21/23 1438)  SpO2: 97 % (09/21/23 1438) Vital Signs (24h Range):  Temp:  [97.5 °F (36.4 °C)-98.1 °F  "(36.7 °C)] 97.9 °F (36.6 °C)  Pulse:  [67-90] 77  Resp:  [16-20] 17  SpO2:  [96 %-99 %] 97 %  BP: (115-134)/(68-84) 122/73     Weight: 93.4 kg (206 lb)  Height: 5' 4.02" (162.6 cm)  Body mass index is 35.34 kg/m².      Intake/Output Summary (Last 24 hours) at 9/21/2023 1520  Last data filed at 9/21/2023 0900  Gross per 24 hour   Intake --   Output 3225 ml   Net -3225 ml       Physical Exam:   Musculoskeletal:       Right lower extremity: Dressing CDI without obvious drainage; compartments are soft and compressible; Tolerates gentle passive ROM at the ankle and knee, moderate swelling- CAM boot removed for comfort; appropriate tenderness to palpation; dorsi/plantar flexes the foot; SILT distally; BCR distally; DP pulse palpable    Left lower extremity: Dressing CDI with mild drainage from pin sites; compartments are soft and compressible; No ankle ROM attempted, able to flex and extend all digits; appropriate tenderness to palpation; dorsi/plantar flexes the foot; SILT distally; BCR distally; DP pulse palpable      Diagnostic Findings:   Significant Labs:   Recent Lab Results       None             Significant Imaging: I have reviewed and interpreted all pertinent imaging results/findings.     Assessment/Plan:     Active Diagnoses:    Diagnosis Date Noted POA    Moderate malnutrition [E44.0] 09/21/2023 Yes    Closed displaced pilon fracture of right tibia [S82.871A] 09/17/2023 Yes    Closed displaced pilon fracture of left tibia [S82.872A] 09/17/2023 Yes      Problems Resolved During this Admission:   H&H stable this morning. Doing okay overall.  Will continue multimodal pain control.   Daily dry dressing changes to RLE as needed beginning today. Okay to re-wrap kerlex for pin sites to LLE. NWB BLE, ROMAT RLE, No ROM LLE until definitive fixation. Hopeful for swelling to be resolved later this week/early next week for staging of the left pilon.  CT scan re-evaluated for surgical planning. Likely hindfoot fusion nail " to the LLE with removal of external fixator with formal fusion to follow this as another procedure after period of healing.  Lovenox for DVT ppx during stay and x 90 days upon discharge.   Appreciate CM help with her medicaid application and likely rehab needs following this hospital stay.   We will continue to monitor her daily.  Progress diet as tolerated. NPO at midnight Friday night.   Will continue to follow through her stay.      The above findings, diagnostics, and treatment plan were discussed with Dr. Humphrey who is in agreement with the plan of care except as stated in additional documentation.      Marli Rossi PA-C  Orthopedic Trauma Surgery  [facility]

## 2023-09-21 NOTE — PT/OT/SLP PROGRESS
Physical Therapy Treatment    Patient Name:  Janeen Arrieta   MRN:  [id number]    Recommendations:     Discharge Recommendations: home health PT  Discharge Equipment Recommendations: slide board, drop arm commode, bath bench  Barriers to discharge: Impaired mobility    Assessment:     Janeen Arrieta is a 47 y.o. female admitted with a medical diagnosis of <principal problem not specified>.  She presents with the following impairments/functional limitations: weakness, impaired endurance, impaired functional mobility, decreased lower extremity function, pain.    Rehab Prognosis: Good; patient would benefit from acute skilled PT services to address these deficits and reach maximum level of function.    Recent Surgery: Procedure(s) (LRB):  ORIF, FRACTURE, PILON (Right)  REVISION, OF EXTERNAL FIXATION (Left) 4 Days Post-Op    Plan:     During this hospitalization, patient to be seen 6 x/week to address the identified rehab impairments via therapeutic activities, therapeutic exercises, wheelchair management/training and progress toward the following goals:    Plan of Care Expires:  10/16/23    Subjective     Chief Complaint:   Patient/Family Comments/goals:   Pain/Comfort:         Objective:     Communicated with nursing prior to session.  Patient found HOB elevated with peripheral IV, PureWick upon PT entry to room.     General Precautions: Standard, fall  Orthopedic Precautions: RLE non weight bearing, LLE non weight bearing  Braces:    Respiratory Status: Room air    Functional Mobility:  Bed Mobility:     Rolling Left:  stand by assistance  Rolling Right: stand by assistance  Supine to Sit: stand by assistance  Sit to Supine: stand by assistance  Transfers:     Bed <> wheel chair: stand by assistance with  slide board  using  Slide Board  VC/TC for technique, safety & sequence  Wheelchair Propulsion:  Pt propelled Standard wheelchair x 300 feet on Level tile with  Bilateral upper extremity with Stand-by Assistance.   VC &  demo for wheel management  Wheelchair Pushups, x 8 reps, SBA    Patient left up in wheelchair with all lines intact, call button in reach, and nurse notified.    GOALS:   Multidisciplinary Problems       Physical Therapy Goals          Problem: Physical Therapy    Goal Priority Disciplines Outcome Goal Variances Interventions   Physical Therapy Goal     PT, PT/OT Ongoing, Progressing     Description: Goals to be met by: 10/16/23     Patient will increase functional independence with mobility by performin. Supine to sit with [place]  2. Bed to chair transfer mod Ind via slide board  3. Pt Independent with WC mobility using BILL UE propulsion x 300ft.                         Time Tracking:     PT Received On: 23  PT Start Time: 840     PT Stop Time: 911  PT Total Time (min): 31 min     Billable Minutes: Therapeutic Activity 31    Treatment Type: Treatment  PT/PTA: PTA     Number of PTA visits since last PT visit: 3     2023

## 2023-09-22 ENCOUNTER — ANESTHESIA EVENT (OUTPATIENT)
Dept: SURGERY | Facility: HOSPITAL | Age: 48
DRG: 493 | End: 2023-09-22
Payer: MEDICAID

## 2023-09-22 PROCEDURE — 94761 N-INVAS EAR/PLS OXIMETRY MLT: CPT

## 2023-09-22 PROCEDURE — 25000003 PHARM REV CODE 250: Performed by: PHYSICIAN ASSISTANT

## 2023-09-22 PROCEDURE — 25000003 PHARM REV CODE 250

## 2023-09-22 PROCEDURE — 21400001 HC TELEMETRY ROOM

## 2023-09-22 PROCEDURE — 97530 THERAPEUTIC ACTIVITIES: CPT | Mod: CQ

## 2023-09-22 PROCEDURE — 97166 OT EVAL MOD COMPLEX 45 MIN: CPT

## 2023-09-22 PROCEDURE — 63600175 PHARM REV CODE 636 W HCPCS

## 2023-09-22 RX ADMIN — OXYCODONE HYDROCHLORIDE 10 MG: 10 TABLET ORAL at 04:09

## 2023-09-22 RX ADMIN — OXYCODONE HYDROCHLORIDE 10 MG: 10 TABLET ORAL at 10:09

## 2023-09-22 RX ADMIN — SENNOSIDES AND DOCUSATE SODIUM 2 TABLET: 50; 8.6 TABLET ORAL at 08:09

## 2023-09-22 RX ADMIN — METHOCARBAMOL 750 MG: 750 TABLET ORAL at 04:09

## 2023-09-22 RX ADMIN — SENNOSIDES AND DOCUSATE SODIUM 2 TABLET: 50; 8.6 TABLET ORAL at 10:09

## 2023-09-22 RX ADMIN — DOCUSATE SODIUM 200 MG: 100 CAPSULE, LIQUID FILLED ORAL at 10:09

## 2023-09-22 RX ADMIN — GABAPENTIN 300 MG: 300 CAPSULE ORAL at 08:09

## 2023-09-22 RX ADMIN — ENOXAPARIN SODIUM 40 MG: 40 INJECTION SUBCUTANEOUS at 04:09

## 2023-09-22 RX ADMIN — FAMOTIDINE 20 MG: 20 TABLET, FILM COATED ORAL at 08:09

## 2023-09-22 RX ADMIN — FAMOTIDINE 20 MG: 20 TABLET, FILM COATED ORAL at 10:09

## 2023-09-22 RX ADMIN — OXYCODONE HYDROCHLORIDE 10 MG: 10 TABLET ORAL at 08:09

## 2023-09-22 RX ADMIN — METHOCARBAMOL 750 MG: 750 TABLET ORAL at 08:09

## 2023-09-22 RX ADMIN — METHOCARBAMOL 750 MG: 750 TABLET ORAL at 10:09

## 2023-09-22 NOTE — PT/OT/SLP EVAL
Occupational Therapy  Evaluation    Name: Janeen Arrieta  MRN: [id number]  Admitting Diagnosis: B/L pilon fx s/p ex fx L side, closed reduction R side  Recent Surgery: Procedure(s) (LRB):  ORIF, FRACTURE, PILON (Right)  REVISION, OF EXTERNAL FIXATION (Left) 5 Days Post-Op    Recommendations:     Discharge Recommendations: home  Discharge Equipment Recommendations:  bath bench, bedside commode  Barriers to discharge:  None    Assessment:     Janeen Arrieta is a 47 y.o. female with a medical diagnosis of B/L pilon fx s/p ex fx L side, closed reduction R side following fall from ladder. Per pt's chart and report, pt's ex fix is expected to be removed on 9/23. She presented with excellent effort and adaptive ways to perform bed mobility and scooting. She demonstrated how she has been donning lotion on L heel and able to doff sock on R side. Pt lives with  whom she states does not work and has since installed a ramp for when she arrives home. She owns a WC and has access to BSC and bath bench. It is recommended OT continue to f/u with tx to ensure safety with ADLs and t/fs to maximize independence. She presents with the following performance deficits affecting function: decreased lower extremity function, orthopedic precautions, impaired self care skills, impaired functional mobility.      Rehab Prognosis: Good; patient would benefit from acute skilled OT services to address these deficits and reach maximum level of function.       Plan:     Patient to be seen 5 x/week to address the above listed problems via self-care/home management, therapeutic activities, therapeutic exercises  Plan of Care Expires: 10/20/23  Plan of Care Reviewed with: patient    Subjective     Chief Complaint: none stated  Patient/Family Comments/goals: get ex fix out and go home soon    Occupational Profile:  Living Environment: lives w/ , steps to entry but has a ramp into home now, tub/shower  Previous level of function:  "independent  Roles and Routines: mother, wife  Equipment Used at Home: wheelchair  Assistance upon Discharge: famil    Pain/Comfort:  Pain Rating 1:  (reports a "little pain" no # given)  Location - Side 1: Left  Location 1: ankle  Pain Addressed 1: Distraction    Patients cultural, spiritual, Protestant conflicts given the current situation:      Objective:     OT communicated with Nsg prior to session.      Patient was found supine with peripheral IV, PureWick upon OT entry to room.    General Precautions: Standard,    Orthopedic Precautions: RLE non weight bearing, LLE non weight bearing  Braces:  (CAM boot on RLE)      Bed Mobility:    Patient completed Scooting along EOB with Mod I  Patient completed Supine to Sit with independence    Functional Mobility/Transfers:      Activities of Daily Living:  Lower Body Dressing: independence don/doff socks on R foot  Ordering [facility] to address toileting, pt has been utilizing bedpan thus far  Unable to don briefs or pants 2/2 ex fix, will con't to assess as appropriate    Functional Cognition:  Intact    Upper Extremity Function:  Right Upper Extremity:   WFL    Left Upper Extremity:  WFL      Therapeutic Positioning  Risk for acquired pressure injuries is decreased due to intact sensation.      Patient Education:  Patient provided with verbal education education regarding OT role/goals/POC.  Understanding was verbalized.     Patient left sitting edge of bed with PT, all lines intact and call button in reach    GOALS:   Multidisciplinary Problems       Occupational Therapy Goals          Problem: Occupational Therapy    Goal Priority Disciplines Outcome Interventions   Occupational Therapy Goal     OT, PT/OT Ongoing, Progressing    Description: Goals to be met by: 10/20/23     Patient will increase functional independence with ADLs by performing:    Pt will transfer to [facility] with Mod I.  Pt will perform toileting on [facility] with Mod I for hygiene and clothing management.  Pt " will perform LB dressing with adaptive techniques with Mod I.  Pt will transfer to tub bench with or without AD with Mod I.                        History:     History reviewed. No pertinent past medical history.      Past Surgical History:   Procedure Laterality Date    APPLICATION, EXTERNAL FIXATION DEVICE Bilateral 9/15/2023    Procedure: APPLICATION, EXTERNAL FIXATION DEVICE;  Surgeon: Felix Charles MD;  Location: [facility];  Service: Orthopedics;  Laterality: Bilateral;  PUNCTURE WOUNDS ONLY    OPEN REDUCTION AND INTERNAL FIXATION (ORIF) OF PILON FRACTURE Right 9/17/2023    Procedure: ORIF, FRACTURE, PILON;  Surgeon: Ryan Humphrey DO;  Location: [facility];  Service: Orthopedics;  Laterality: Right;  Supine vascular bed bone foam c arm Manipulate Ex fix on L with Yamel Ex fix stuff, Fix Right ankle. Need to remova  ex fix, Synthes medial pilon plate, CCHS 4.0    REVISION OF PROCEDURE INVOLVING EXTERNAL FIXATION DEVICE Left 9/17/2023    Procedure: REVISION, OF EXTERNAL FIXATION;  Surgeon: Ryan Humphrey DO;  Location: [facility];  Service: Orthopedics;  Laterality: Left;       Time Tracking:     OT Date of Treatment: 09/22/23  OT Start Time: 1053  OT Stop Time: 1116  OT Total Time (min): 23 min    Billable Minutes:Evaluation Mod Complexity    9/22/2023

## 2023-09-22 NOTE — PLAN OF CARE
Problem: Occupational Therapy  Goal: Occupational Therapy Goal  Description: Goals to be met by: 10/20/23     Patient will increase functional independence with ADLs by performing:    Pt will transfer to BSC with Mod I.  Pt will perform toileting on BSC with Mod I for hygiene and clothing management.  Pt will perform LB dressing with adaptive techniques with Mod I.  Pt will transfer to tub bench with or without AD with Mod I.   Outcome: Ongoing, Progressing

## 2023-09-22 NOTE — PROGRESS NOTES
[facility] - Ortho Neuro  Orthopedics  Progress Note    Patient Name: Janeen Arrieta  MRN: [id number]  Admission Date: 9/15/2023  Hospital Length of Stay: 7 days  Attending Provider: Ryan Humphrey DO  Primary Care Provider: Andrea Norman MD  Follow-up For: Procedure(s) (LRB):  ORIF, FRACTURE, PILON (Right)  REVISION, OF EXTERNAL FIXATION (Left)    Post-Operative Day: 5 Days Post-Op  Subjective:     Principal Problem:<principal problem not specified>    Principal Orthopedic Problem: 5 Days Post-Op   ORIF right pilon ankle fracture. POD 7 ex fix left pilon- pending operative stabilization.    Interval History: Patient POD 4 ORIF right pilon fx. Left pilon fx remains in ex fix. Elevated on wedge. Pain is improved overall. No numbness or tingling distally. Swelling has improved. Discussed surgical options again today. Planning for ORIF vs hindfoot fusion tomorrow.     Review of patient's allergies indicates:  No Known Allergies    Current Facility-Administered Medications   Medication    0.9%  NaCl infusion    0.9%  NaCl infusion    acetaminophen tablet 650 mg    aluminum-magnesium hydroxide-simethicone 200-200-20 mg/5 mL suspension 30 mL    docusate sodium capsule 200 mg    electrolyte-A infusion    electrolyte-A infusion    enoxaparin injection 40 mg    famotidine tablet 20 mg    gabapentin capsule 300 mg    lactated ringers infusion    lactulose 20 gram/30 mL solution Soln 20 g    melatonin tablet 6 mg    methocarbamoL tablet 750 mg    ondansetron injection 4 mg    ondansetron injection 4 mg    oxyCODONE immediate release tablet 5 mg    oxyCODONE immediate release tablet Tab 10 mg    polyethylene glycol packet 17 g    senna-docusate 8.6-50 mg per tablet 2 tablet    sodium chloride 0.9% flush 10 mL    sodium chloride 0.9% flush 3 mL     Objective:     Vital Signs (Most Recent):  Temp: 98.5 °F (36.9 °C) (09/22/23 1637)  Pulse: 88 (09/22/23 1637)  Resp: 18 (09/22/23 1637)  BP: 122/73 (09/22/23  "1637)  SpO2: 96 % (09/22/23 1637) Vital Signs (24h Range):  Temp:  [97.6 °F (36.4 °C)-98.5 °F (36.9 °C)] 98.5 °F (36.9 °C)  Pulse:  [69-88] 88  Resp:  [16-18] 18  SpO2:  [96 %-100 %] 96 %  BP: (115-128)/(58-81) 122/73     Weight: 93.4 kg (206 lb)  Height: 5' 4.02" (162.6 cm)  Body mass index is 35.34 kg/m².      Intake/Output Summary (Last 24 hours) at 9/22/2023 1739  Last data filed at 9/22/2023 1542  Gross per 24 hour   Intake --   Output 2950 ml   Net -2950 ml         Physical Exam:   Musculoskeletal:       Right lower extremity: Dressing CDI without obvious drainage; compartments are soft and compressible; Tolerates gentle passive ROM at the ankle and knee, moderate swelling- CAM boot removed for comfort; appropriate tenderness to palpation; dorsi/plantar flexes the foot; SILT distally; BCR distally; DP pulse palpable    Left lower extremity: Dressing removed. Pin sites clean and dry. Wrinkling present; compartments are soft and compressible; No ankle ROM attempted, able to flex and extend all digits; appropriate tenderness to palpation; dorsi/plantar flexes the foot; SILT distally; BCR distally; DP pulse palpable      Diagnostic Findings:   Significant Labs:   Recent Lab Results       None             Significant Imaging: I have reviewed and interpreted all pertinent imaging results/findings.     Assessment/Plan:     Active Diagnoses:    Diagnosis Date Noted POA    Moderate malnutrition [E44.0] 09/21/2023 Yes    Closed displaced pilon fracture of right tibia [S82.871A] 09/17/2023 Yes    Closed displaced pilon fracture of left tibia [S82.872A] 09/17/2023 Yes      Problems Resolved During this Admission:   Repeat labs tomorrow morning for pre-op  Daily dry dressing changes to RLE as needed beginning today. Okay to re-wrap kerlex for pin sites to LLE. NWB BLE, ROMAT RLE, No ROM LLE until definitive fixation.   Swelling much improved. Plan for surgery tomorrow. Will get pushed pending traumas as her fracture " remains stable at this time.  CT scan re-evaluated for surgical planning. Likely hindfoot fusion nail to the LLE with removal of external fixator with formal fusion to follow this as another procedure after period of healing.  Lovenox for DVT ppx during stay and x 90 days upon discharge.   Appreciate CM help with her medicaid application and likely rehab needs following this hospital stay.   We will continue to monitor her daily.  Progress diet as tolerated. NPO at midnight Friday night.   Will continue to follow through her stay.      The above findings, diagnostics, and treatment plan were discussed with Dr. Humphrey who is in agreement with the plan of care except as stated in additional documentation.      Marli Rossi PA-C  Orthopedic Trauma Surgery  [facility]

## 2023-09-22 NOTE — PT/OT/SLP PROGRESS
Physical Therapy Treatment    Patient Name:  Janeen Arrieta   MRN:  [id number]    Recommendations:     Discharge Recommendations: home  Discharge Equipment Recommendations: wheelchair, bedside commode, bath bench, slide board (drop arm platform commode)  Barriers to discharge: Ongoing medical needs    Assessment:     Janeen Arrieta is a 47 y.o. female admitted with a medical diagnosis of B/L pilon fx s/p ex fx L side, closed reduction R side following fall from ladder. Per pt's chart and report, pt's ex fix is expected to be removed on 9/23.  She presents with the following impairments/functional limitations: weakness, impaired endurance, impaired functional mobility, decreased lower extremity function, pain     Rehab Prognosis: Good; patient would benefit from acute skilled PT services to address these deficits and reach maximum level of function.    Recent Surgery: Procedure(s) (LRB):  ORIF, FRACTURE, PILON (Right)  REVISION, OF EXTERNAL FIXATION (Left) 5 Days Post-Op    Plan:     During this hospitalization, patient to be seen 6 x/week to address the identified rehab impairments via therapeutic activities, therapeutic exercises, wheelchair management/training and progress toward the following goals:    Plan of Care Expires:  10/16/23    Subjective     Chief Complaint:   Patient/Family Comments/goals:   Pain/Comfort:  Pain Rating 1: 0/10      Objective:     Communicated with NSG prior to session.  Patient found sitting edge of bed finishing OT eval with telemetry upon PT entry to room.     General Precautions: Standard, fall  Orthopedic Precautions: RLE non weight bearing, LLE non weight bearing  Braces:    Respiratory Status: Room air  Blood Pressure:   Skin Integrity: Visible skin intact      Functional Mobility:  Bed Mobility:     Scooting: stand by assistance  Transfers:     Bed to Chair: contact guard assistance with  wheelchair  using  Slide Board and pt able to place/remove SB without assist  Wheelchair  Propulsion:  Pt propelled Standard wheelchair x 300ft feet on Level tile with  Bilateral upper extremity with Stand-by Assistance.     Therapeutic Activities/Exercises:  W/c pushups: 10 reps SBA    Patient left up in chair with call button in reach..    GOALS:   Multidisciplinary Problems       Physical Therapy Goals          Problem: Physical Therapy    Goal Priority Disciplines Outcome Goal Variances Interventions   Physical Therapy Goal     PT, PT/OT Ongoing, Progressing     Description: Goals to be met by: 10/16/23     Patient will increase functional independence with mobility by performin. Supine to sit with Guadalupe  2. Bed to chair transfer mod Ind via slide board  3. Pt Independent with WC mobility using BILL UE propulsion x 300ft.                         Time Tracking:     PT Received On: 23  PT Start Time: 1113     PT Stop Time: 1141  PT Total Time (min): 28 min     Billable Minutes: Therapeutic Activity 28    Treatment Type: Treatment  PT/PTA: PTA     Number of PTA visits since last PT visit: 4     2023

## 2023-09-23 ENCOUNTER — ANESTHESIA (OUTPATIENT)
Dept: SURGERY | Facility: HOSPITAL | Age: 48
DRG: 493 | End: 2023-09-23
Payer: MEDICAID

## 2023-09-23 LAB
ALBUMIN SERPL-MCNC: 3.3 G/DL (ref 3.5–5)
ALBUMIN/GLOB SERPL: 1 RATIO (ref 1.1–2)
ALP SERPL-CCNC: 87 UNIT/L (ref 40–150)
ALT SERPL-CCNC: 19 UNIT/L (ref 0–55)
AST SERPL-CCNC: 25 UNIT/L (ref 5–34)
BASOPHILS # BLD AUTO: 0.03 X10(3)/MCL
BASOPHILS NFR BLD AUTO: 0.3 %
BILIRUB SERPL-MCNC: 0.7 MG/DL
BUN SERPL-MCNC: 11.7 MG/DL (ref 7–18.7)
CALCIUM SERPL-MCNC: 9.1 MG/DL (ref 8.4–10.2)
CHLORIDE SERPL-SCNC: 103 MMOL/L (ref 98–107)
CO2 SERPL-SCNC: 28 MMOL/L (ref 22–29)
CREAT SERPL-MCNC: 0.74 MG/DL (ref 0.55–1.02)
EOSINOPHIL # BLD AUTO: 0.37 X10(3)/MCL (ref 0–0.9)
EOSINOPHIL NFR BLD AUTO: 4.3 %
ERYTHROCYTE [DISTWIDTH] IN BLOOD BY AUTOMATED COUNT: 13.6 % (ref 11.5–17)
GFR SERPLBLD CREATININE-BSD FMLA CKD-EPI: >60 MLS/MIN/1.73/M2
GLOBULIN SER-MCNC: 3.3 GM/DL (ref 2.4–3.5)
GLUCOSE SERPL-MCNC: 98 MG/DL (ref 74–100)
HCT VFR BLD AUTO: 33.7 % (ref 37–47)
HGB BLD-MCNC: 10.9 G/DL (ref 12–16)
IMM GRANULOCYTES # BLD AUTO: 0.07 X10(3)/MCL (ref 0–0.04)
IMM GRANULOCYTES NFR BLD AUTO: 0.8 %
LYMPHOCYTES # BLD AUTO: 2.36 X10(3)/MCL (ref 0.6–4.6)
LYMPHOCYTES NFR BLD AUTO: 27.1 %
MCH RBC QN AUTO: 27.9 PG (ref 27–31)
MCHC RBC AUTO-ENTMCNC: 32.3 G/DL (ref 33–36)
MCV RBC AUTO: 86.4 FL (ref 80–94)
MONOCYTES # BLD AUTO: 0.81 X10(3)/MCL (ref 0.1–1.3)
MONOCYTES NFR BLD AUTO: 9.3 %
NEUTROPHILS # BLD AUTO: 5.06 X10(3)/MCL (ref 2.1–9.2)
NEUTROPHILS NFR BLD AUTO: 58.2 %
NRBC BLD AUTO-RTO: 0 %
PLATELET # BLD AUTO: 341 X10(3)/MCL (ref 130–400)
PMV BLD AUTO: 9.3 FL (ref 7.4–10.4)
POTASSIUM SERPL-SCNC: 4.1 MMOL/L (ref 3.5–5.1)
PROT SERPL-MCNC: 6.6 GM/DL (ref 6.4–8.3)
RBC # BLD AUTO: 3.9 X10(6)/MCL (ref 4.2–5.4)
SODIUM SERPL-SCNC: 138 MMOL/L (ref 136–145)
WBC # SPEC AUTO: 8.7 X10(3)/MCL (ref 4.5–11.5)

## 2023-09-23 PROCEDURE — 25000003 PHARM REV CODE 250: Performed by: PHYSICIAN ASSISTANT

## 2023-09-23 PROCEDURE — 21400001 HC TELEMETRY ROOM

## 2023-09-23 PROCEDURE — 85025 COMPLETE CBC W/AUTO DIFF WBC: CPT | Performed by: PHYSICIAN ASSISTANT

## 2023-09-23 PROCEDURE — 63600175 PHARM REV CODE 636 W HCPCS

## 2023-09-23 PROCEDURE — 25000003 PHARM REV CODE 250

## 2023-09-23 PROCEDURE — 80053 COMPREHEN METABOLIC PANEL: CPT | Performed by: PHYSICIAN ASSISTANT

## 2023-09-23 RX ADMIN — METHOCARBAMOL 750 MG: 750 TABLET ORAL at 08:09

## 2023-09-23 RX ADMIN — DOCUSATE SODIUM 200 MG: 100 CAPSULE, LIQUID FILLED ORAL at 08:09

## 2023-09-23 RX ADMIN — OXYCODONE HYDROCHLORIDE 10 MG: 10 TABLET ORAL at 12:09

## 2023-09-23 RX ADMIN — OXYCODONE HYDROCHLORIDE 10 MG: 10 TABLET ORAL at 04:09

## 2023-09-23 RX ADMIN — FAMOTIDINE 20 MG: 20 TABLET, FILM COATED ORAL at 08:09

## 2023-09-23 RX ADMIN — OXYCODONE HYDROCHLORIDE 10 MG: 10 TABLET ORAL at 05:09

## 2023-09-23 RX ADMIN — ENOXAPARIN SODIUM 40 MG: 40 INJECTION SUBCUTANEOUS at 03:09

## 2023-09-23 RX ADMIN — OXYCODONE HYDROCHLORIDE 10 MG: 10 TABLET ORAL at 08:09

## 2023-09-23 RX ADMIN — GABAPENTIN 300 MG: 300 CAPSULE ORAL at 08:09

## 2023-09-23 RX ADMIN — SENNOSIDES AND DOCUSATE SODIUM 2 TABLET: 50; 8.6 TABLET ORAL at 08:09

## 2023-09-23 RX ADMIN — METHOCARBAMOL 750 MG: 750 TABLET ORAL at 03:09

## 2023-09-23 NOTE — PROGRESS NOTES
[facility] Neuro  Orthopedics  Progress Note    Patient Name: Janeen Arrieta  MRN: [id number]  Admission Date: 9/15/2023  Hospital Length of Stay: 8 days  Attending Provider: Ryan Humphrey DO  Primary Care Provider: Andrea Norman MD  Follow-up For: Procedure(s) (LRB):  ORIF, FRACTURE, PILON (Right)  REVISION, OF EXTERNAL FIXATION (Left)    Post-Operative Day: 6 Days Post-Op  Subjective:     Principal Problem:<principal problem not specified>    Principal Orthopedic Problem: 6 Days Post-Op   ORIF right pilon ankle fracture. POD 8 ex fix left pilon- pending operative stabilization.    Interval History: Patient POD 6 ORIF right pilon fx. Left pilon fx remains in ex fix. Elevated on wedge. Pain is improved overall. No numbness or tingling distally. Swelling continues to improve to the LLE. Doing okay overall. No BM, taking stool softeners.     Review of patient's allergies indicates:  No Known Allergies    Current Facility-Administered Medications   Medication    0.9%  NaCl infusion    0.9%  NaCl infusion    acetaminophen tablet 650 mg    aluminum-magnesium hydroxide-simethicone 200-200-20 mg/5 mL suspension 30 mL    docusate sodium capsule 200 mg    electrolyte-A infusion    electrolyte-A infusion    enoxaparin injection 40 mg    famotidine tablet 20 mg    gabapentin capsule 300 mg    lactated ringers infusion    lactulose 20 gram/30 mL solution Soln 20 g    melatonin tablet 6 mg    methocarbamoL tablet 750 mg    ondansetron injection 4 mg    ondansetron injection 4 mg    oxyCODONE immediate release tablet 5 mg    oxyCODONE immediate release tablet Tab 10 mg    polyethylene glycol packet 17 g    senna-docusate 8.6-50 mg per tablet 2 tablet    sodium chloride 0.9% flush 10 mL    sodium chloride 0.9% flush 3 mL     Objective:     Vital Signs (Most Recent):  Temp: 98.3 °F (36.8 °C) (09/23/23 1041)  Pulse: 70 (09/23/23 1041)  Resp: 17 (09/23/23 0854)  BP: 109/72 (09/23/23 1041)  SpO2: 98 %  "(09/23/23 1041) Vital Signs (24h Range):  Temp:  [98 °F (36.7 °C)-98.5 °F (36.9 °C)] 98.3 °F (36.8 °C)  Pulse:  [70-88] 70  Resp:  [16-18] 17  SpO2:  [96 %-100 %] 98 %  BP: (109-134)/(65-76) 109/72     Weight: 93.4 kg (206 lb)  Height: 5' 4.02" (162.6 cm)  Body mass index is 35.34 kg/m².      Intake/Output Summary (Last 24 hours) at 9/23/2023 1042  Last data filed at 9/23/2023 0822  Gross per 24 hour   Intake 360 ml   Output 4100 ml   Net -3740 ml         Physical Exam:   Musculoskeletal:       Right lower extremity: removed and replaced. Incisions clean and dry without erythema, drainage, signs of dehiscence; compartments are soft and compressible; Tolerates gentle passive ROM at the ankle and knee,appropriate tenderness to palpation; dorsi/plantar flexes the foot; SILT distally; BCR distally; DP pulse palpable; overall very good range of motion    Left lower extremity: Pin sites clean and dry. Wrinkling present; compartments are soft and compressible; No ankle ROM attempted, able to flex and extend all digits; appropriate tenderness to palpation; SILT distally; BCR distally; DP pulse palpable      Diagnostic Findings:   Significant Labs:   Recent Lab Results         09/23/23  0048        Albumin/Globulin Ratio 1.0       Albumin 3.3       Alkaline Phosphatase 87       ALT 19       AST 25       Baso # 0.03       Basophil % 0.3       BILIRUBIN TOTAL 0.7       BUN 11.7       Calcium 9.1       Chloride 103       CO2 28       Creatinine 0.74       eGFR >60       Eos # 0.37       Eosinophil % 4.3       Globulin, Total 3.3       Glucose 98       Hematocrit 33.7       Hemoglobin 10.9       Immature Grans (Abs) 0.07       Immature Granulocytes 0.8       Lymph # 2.36       LYMPH % 27.1       MCH 27.9       MCHC 32.3       MCV 86.4       Mono # 0.81       Mono % 9.3       MPV 9.3       Neut # 5.06       Neut % 58.2       nRBC 0.0       Platelets 341       Potassium 4.1       PROTEIN TOTAL 6.6       RBC 3.90       RDW 13.6    "    Sodium 138       WBC 8.70                Significant Imaging: I have reviewed and interpreted all pertinent imaging results/findings.     Assessment/Plan:     Active Diagnoses:    Diagnosis Date Noted POA    Moderate malnutrition [E44.0] 09/21/2023 Yes    Closed displaced pilon fracture of right tibia [S82.871A] 09/17/2023 Yes    Closed displaced pilon fracture of left tibia [S82.872A] 09/17/2023 Yes      Problems Resolved During this Admission:   Repeat labs tomorrow morning for pre-op again. Surgery delayed due to OR availability and multiple traumas overnight.  Daily dry dressing changes to RLE as needed beginning today. Okay to re-wrap kerlex for pin sites to LLE. NWB BLE, ROMAT RLE, No ROM LLE until definitive fixation.   Swelling much improved. Plan for surgery tomorrow vs monday.  CT scan re-evaluated for surgical planning. Likely hindfoot fusion nail to the LLE with removal of external fixator with formal fusion to follow this as another procedure after period of healing, patient in agreement with this plan.  Lovenox for DVT ppx during stay and x 90 days upon discharge.   Appreciate CM help with her medicaid application and likely rehab needs following this hospital stay.   We will continue to monitor her daily.  Progress diet as tolerated today,  NPO at midnight tonight.  Will continue to follow through her stay.      The above findings, diagnostics, and treatment plan were discussed with Dr. Humphrey who is in agreement with the plan of care except as stated in additional documentation.      Marli Rossi PA-C  Orthopedic Trauma Surgery  [facility]

## 2023-09-24 PROCEDURE — 25000003 PHARM REV CODE 250

## 2023-09-24 PROCEDURE — 21400001 HC TELEMETRY ROOM

## 2023-09-24 PROCEDURE — 25000003 PHARM REV CODE 250: Performed by: PHYSICIAN ASSISTANT

## 2023-09-24 PROCEDURE — 63600175 PHARM REV CODE 636 W HCPCS

## 2023-09-24 PROCEDURE — 63600175 PHARM REV CODE 636 W HCPCS: Performed by: PHYSICIAN ASSISTANT

## 2023-09-24 RX ORDER — HYDROCORTISONE 1 %
CREAM (GRAM) TOPICAL 2 TIMES DAILY
Status: DISCONTINUED | OUTPATIENT
Start: 2023-09-24 | End: 2023-09-28 | Stop reason: HOSPADM

## 2023-09-24 RX ORDER — GABAPENTIN 300 MG/1
300 CAPSULE ORAL 2 TIMES DAILY
Status: DISCONTINUED | OUTPATIENT
Start: 2023-09-24 | End: 2023-09-26

## 2023-09-24 RX ORDER — CEFAZOLIN SODIUM 2 G/50ML
2 SOLUTION INTRAVENOUS
Status: DISCONTINUED | OUTPATIENT
Start: 2023-09-24 | End: 2023-09-28 | Stop reason: HOSPADM

## 2023-09-24 RX ADMIN — METHOCARBAMOL 750 MG: 750 TABLET ORAL at 09:09

## 2023-09-24 RX ADMIN — DOCUSATE SODIUM 200 MG: 100 CAPSULE, LIQUID FILLED ORAL at 09:09

## 2023-09-24 RX ADMIN — GABAPENTIN 300 MG: 300 CAPSULE ORAL at 09:09

## 2023-09-24 RX ADMIN — OXYCODONE HYDROCHLORIDE 10 MG: 10 TABLET ORAL at 06:09

## 2023-09-24 RX ADMIN — ENOXAPARIN SODIUM 40 MG: 40 INJECTION SUBCUTANEOUS at 03:09

## 2023-09-24 RX ADMIN — CEFAZOLIN SODIUM 2 G: 2 SOLUTION INTRAVENOUS at 09:09

## 2023-09-24 RX ADMIN — NITROGLYCERIN 0.5 INCH: 20 OINTMENT TOPICAL at 03:09

## 2023-09-24 RX ADMIN — FAMOTIDINE 20 MG: 20 TABLET, FILM COATED ORAL at 09:09

## 2023-09-24 RX ADMIN — OXYCODONE HYDROCHLORIDE 10 MG: 10 TABLET ORAL at 07:09

## 2023-09-24 RX ADMIN — HYDROCORTISONE: 10 CREAM TOPICAL at 09:09

## 2023-09-24 RX ADMIN — OXYCODONE HYDROCHLORIDE 10 MG: 10 TABLET ORAL at 12:09

## 2023-09-24 RX ADMIN — OXYCODONE HYDROCHLORIDE 10 MG: 10 TABLET ORAL at 02:09

## 2023-09-24 RX ADMIN — CEFAZOLIN SODIUM 2 G: 2 SOLUTION INTRAVENOUS at 03:09

## 2023-09-24 RX ADMIN — METHOCARBAMOL 750 MG: 750 TABLET ORAL at 03:09

## 2023-09-24 RX ADMIN — SENNOSIDES AND DOCUSATE SODIUM 2 TABLET: 50; 8.6 TABLET ORAL at 09:09

## 2023-09-24 NOTE — PROGRESS NOTES
[facility] - [facility] Neuro  Orthopedics  Progress Note    Patient Name: Janeen Arrieta  MRN: [id number]  Admission Date: 9/15/2023  Hospital Length of Stay: 9 days  Attending Provider: Ryan Humphrey DO  Primary Care Provider: Andrea Norman MD  Follow-up For: Procedure(s) (LRB):  ORIF, FRACTURE, PILON (Right)  REVISION, OF EXTERNAL FIXATION (Left)    Post-Operative Day: 7 Days Post-Op  Subjective:     Principal Problem:<principal problem not specified>    Principal Orthopedic Problem: 7 Days Post-Op   ORIF right pilon ankle fracture. POD 8 ex fix left pilon- pending operative stabilization.    Interval History: Patient POD 7 ORIF right pilon fx. Left pilon fx remains in ex fix. Elevated on wedge. Pain is improved overall. No numbness or tingling distally. Some burning and itching at her right thigh varicose veins Doing okay overall. Eager for her next procedure to the LLE    Review of patient's allergies indicates:  No Known Allergies    Current Facility-Administered Medications   Medication    0.9%  NaCl infusion    0.9%  NaCl infusion    acetaminophen tablet 650 mg    aluminum-magnesium hydroxide-simethicone 200-200-20 mg/5 mL suspension 30 mL    cefazolin (ANCEF) 2 gram in dextrose 5% 50 mL IVPB (premix)    docusate sodium capsule 200 mg    electrolyte-A infusion    electrolyte-A infusion    enoxaparin injection 40 mg    famotidine tablet 20 mg    gabapentin capsule 300 mg    hydrocortisone 1 % cream    lactated ringers infusion    lactulose 20 gram/30 mL solution Soln 20 g    melatonin tablet 6 mg    methocarbamoL tablet 750 mg    nitroGLYCERIN 2% TD oint ointment 0.5 inch    ondansetron injection 4 mg    ondansetron injection 4 mg    oxyCODONE immediate release tablet 5 mg    oxyCODONE immediate release tablet Tab 10 mg    polyethylene glycol packet 17 g    senna-docusate 8.6-50 mg per tablet 2 tablet    sodium chloride 0.9% flush 10 mL    sodium chloride 0.9% flush 3 mL     Objective:  "    Vital Signs (Most Recent):  Temp: 98.3 °F (36.8 °C) (09/24/23 1042)  Pulse: 78 (09/24/23 1042)  Resp: 20 (09/24/23 1253)  BP: 119/71 (09/24/23 1042)  SpO2: 96 % (09/24/23 1042) Vital Signs (24h Range):  Temp:  [97.8 °F (36.6 °C)-98.4 °F (36.9 °C)] 98.3 °F (36.8 °C)  Pulse:  [71-91] 78  Resp:  [16-20] 20  SpO2:  [96 %-99 %] 96 %  BP: ()/(56-85) 119/71     Weight: 93.4 kg (206 lb)  Height: 5' 4.02" (162.6 cm)  Body mass index is 35.34 kg/m².      Intake/Output Summary (Last 24 hours) at 9/24/2023 1301  Last data filed at 9/24/2023 0300  Gross per 24 hour   Intake --   Output 1400 ml   Net -1400 ml         Physical Exam:   Musculoskeletal:       Right lower extremity: removed and replaced. Incisions clean and dry without erythema, drainage, signs of dehiscence; compartments are soft and compressible; mild erythema surrounding ankle; Tolerates gentle passive ROM at the ankle and knee,appropriate tenderness to palpation; dorsi/plantar flexes the foot; SILT distally; BCR distally; DP pulse palpable; overall very good range of motion    Left lower extremity: Pin sites clean and dry. Wrinkling present; compartments are soft and compressible; No ankle ROM attempted, able to flex and extend all digits; appropriate tenderness to palpation; SILT distally; BCR distally; DP pulse palpable      Diagnostic Findings:   Significant Labs:   Recent Lab Results         09/23/23  0048        Albumin/Globulin Ratio 1.0       Albumin 3.3       Alkaline Phosphatase 87       ALT 19       AST 25       Baso # 0.03       Basophil % 0.3       BILIRUBIN TOTAL 0.7       BUN 11.7       Calcium 9.1       Chloride 103       CO2 28       Creatinine 0.74       eGFR >60       Eos # 0.37       Eosinophil % 4.3       Globulin, Total 3.3       Glucose 98       Hematocrit 33.7       Hemoglobin 10.9       Immature Grans (Abs) 0.07       Immature Granulocytes 0.8       Lymph # 2.36       LYMPH % 27.1       MCH 27.9       MCHC 32.3       MCV 86.4    "    Mono # 0.81       Mono % 9.3       MPV 9.3       Neut # 5.06       Neut % 58.2       nRBC 0.0       Platelets 341       Potassium 4.1       PROTEIN TOTAL 6.6       RBC 3.90       RDW 13.6       Sodium 138       WBC 8.70                Significant Imaging: I have reviewed and interpreted all pertinent imaging results/findings.     Assessment/Plan:     Active Diagnoses:    Diagnosis Date Noted POA    Moderate malnutrition [E44.0] 09/21/2023 Yes    Closed displaced pilon fracture of right tibia [S82.871A] 09/17/2023 Yes    Closed displaced pilon fracture of left tibia [S82.872A] 09/17/2023 Yes      Problems Resolved During this Admission:   Continue morning labs for pre-op evaluation.  Surgery delayed due to OR availability and multiple traumas overnight. Will Plan for Monday.  Daily dry dressing changes to RLE as needed beginning today. Okay to re-wrap kerlex for pin sites to LLE. NWB BLE, ROMAT RLE, No ROM LLE until definitive fixation.   Swelling much improved. Plan for surgery tomorrow vs monday.  CT scan re-evaluated for surgical planning. Likely hindfoot fusion nail to the LLE with removal of external fixator with formal fusion to follow this as another procedure after period of healing, patient in agreement with this plan.  Lovenox for DVT ppx during stay and x 90 days upon discharge.   Appreciate CM help with her medicaid application and likely rehab needs following this hospital stay.   We will continue to monitor her daily.  Progress diet as tolerated today,  NPO at midnight tonight.  Will continue to follow through her stay.  No acute changes. Nitroglyerin ointment and hydrocortisone cream to the thigh. Continuous ancef until next surgery. Continue to monitor RLE incision site.      The above findings, diagnostics, and treatment plan were discussed with Dr. Humphrey who is in agreement with the plan of care except as stated in additional documentation.      Marli Rossi PA-C  Orthopedic Trauma  Surgery  [facility]

## 2023-09-25 PROCEDURE — 36000708 HC OR TIME LEV III 1ST 15 MIN: Performed by: ORTHOPAEDIC SURGERY

## 2023-09-25 PROCEDURE — 64450 NJX AA&/STRD OTHER PN/BRANCH: CPT | Mod: 59,LT,, | Performed by: ANESTHESIOLOGY

## 2023-09-25 PROCEDURE — D9220A PRA ANESTHESIA: Mod: CRNA,,, | Performed by: NURSE ANESTHETIST, CERTIFIED REGISTERED

## 2023-09-25 PROCEDURE — 63600175 PHARM REV CODE 636 W HCPCS

## 2023-09-25 PROCEDURE — 63600175 PHARM REV CODE 636 W HCPCS: Performed by: ORTHOPAEDIC SURGERY

## 2023-09-25 PROCEDURE — 27870 FUSION OF ANKLE JOINT OPEN: CPT | Mod: 58,LT,, | Performed by: ORTHOPAEDIC SURGERY

## 2023-09-25 PROCEDURE — D9220A PRA ANESTHESIA: ICD-10-PCS | Mod: ANES,,, | Performed by: ANESTHESIOLOGY

## 2023-09-25 PROCEDURE — D9220A PRA ANESTHESIA: ICD-10-PCS | Mod: CRNA,,, | Performed by: NURSE ANESTHETIST, CERTIFIED REGISTERED

## 2023-09-25 PROCEDURE — 25000003 PHARM REV CODE 250: Performed by: ORTHOPAEDIC SURGERY

## 2023-09-25 PROCEDURE — 27870 PR ARTHRODESIS,ANKLE,OPEN: ICD-10-PCS | Mod: 58,LT,, | Performed by: ORTHOPAEDIC SURGERY

## 2023-09-25 PROCEDURE — 25000003 PHARM REV CODE 250

## 2023-09-25 PROCEDURE — 37000008 HC ANESTHESIA 1ST 15 MINUTES: Performed by: ORTHOPAEDIC SURGERY

## 2023-09-25 PROCEDURE — 27870 FUSION OF ANKLE JOINT OPEN: CPT | Mod: 58,AS,LT, | Performed by: PHYSICIAN ASSISTANT

## 2023-09-25 PROCEDURE — C1769 GUIDE WIRE: HCPCS | Performed by: ORTHOPAEDIC SURGERY

## 2023-09-25 PROCEDURE — 36000709 HC OR TIME LEV III EA ADD 15 MIN: Performed by: ORTHOPAEDIC SURGERY

## 2023-09-25 PROCEDURE — 64447 NJX AA&/STRD FEMORAL NRV IMG: CPT | Mod: 59,LT,, | Performed by: ANESTHESIOLOGY

## 2023-09-25 PROCEDURE — 37000009 HC ANESTHESIA EA ADD 15 MINS: Performed by: ORTHOPAEDIC SURGERY

## 2023-09-25 PROCEDURE — 25000003 PHARM REV CODE 250: Performed by: PHYSICIAN ASSISTANT

## 2023-09-25 PROCEDURE — C1713 ANCHOR/SCREW BN/BN,TIS/BN: HCPCS | Performed by: ORTHOPAEDIC SURGERY

## 2023-09-25 PROCEDURE — 27201423 OPTIME MED/SURG SUP & DEVICES STERILE SUPPLY: Performed by: ORTHOPAEDIC SURGERY

## 2023-09-25 PROCEDURE — 63600175 PHARM REV CODE 636 W HCPCS: Performed by: NURSE ANESTHETIST, CERTIFIED REGISTERED

## 2023-09-25 PROCEDURE — 63600175 PHARM REV CODE 636 W HCPCS: Performed by: ANESTHESIOLOGY

## 2023-09-25 PROCEDURE — 64447 NJX AA&/STRD FEMORAL NRV IMG: CPT | Performed by: ANESTHESIOLOGY

## 2023-09-25 PROCEDURE — 21400001 HC TELEMETRY ROOM

## 2023-09-25 PROCEDURE — 27800903 OPTIME MED/SURG SUP & DEVICES OTHER IMPLANTS: Performed by: ORTHOPAEDIC SURGERY

## 2023-09-25 PROCEDURE — 27870 PR ARTHRODESIS,ANKLE,OPEN: ICD-10-PCS | Mod: 58,AS,LT, | Performed by: PHYSICIAN ASSISTANT

## 2023-09-25 PROCEDURE — D9220A PRA ANESTHESIA: Mod: ANES,,, | Performed by: ANESTHESIOLOGY

## 2023-09-25 PROCEDURE — 63600175 PHARM REV CODE 636 W HCPCS: Performed by: PHYSICIAN ASSISTANT

## 2023-09-25 PROCEDURE — 25000003 PHARM REV CODE 250: Performed by: NURSE ANESTHETIST, CERTIFIED REGISTERED

## 2023-09-25 PROCEDURE — 64450 PR NERVE BLOCK INJ, ANES/STEROID, OTHER PERIPHERAL: ICD-10-PCS | Mod: 59,LT,, | Performed by: ANESTHESIOLOGY

## 2023-09-25 PROCEDURE — 64447 PR NERVE BLOCK INJ, ANES/STEROID, FEMORAL, INCL IMAG GUIDANCE: ICD-10-PCS | Mod: 59,LT,, | Performed by: ANESTHESIOLOGY

## 2023-09-25 PROCEDURE — 71000033 HC RECOVERY, INTIAL HOUR: Performed by: ORTHOPAEDIC SURGERY

## 2023-09-25 DEVICE — LOCKING SCREW, PARTIALLY THREADED: Type: IMPLANTABLE DEVICE | Site: ANKLE | Status: FUNCTIONAL

## 2023-09-25 DEVICE — LOCKING SCREW, FULLY THREADED: Type: IMPLANTABLE DEVICE | Site: ANKLE | Status: FUNCTIONAL

## 2023-09-25 DEVICE — KIT BONE GRFT BMP SM: Type: IMPLANTABLE DEVICE | Site: ANKLE | Status: FUNCTIONAL

## 2023-09-25 DEVICE — COMPRESSION SCREW CANNULATED
Type: IMPLANTABLE DEVICE | Site: ANKLE | Status: FUNCTIONAL
Brand: T2

## 2023-09-25 RX ORDER — HYDROCODONE BITARTRATE AND ACETAMINOPHEN 5; 325 MG/1; MG/1
1 TABLET ORAL
Status: DISCONTINUED | OUTPATIENT
Start: 2023-09-25 | End: 2023-09-25 | Stop reason: HOSPADM

## 2023-09-25 RX ORDER — ONDANSETRON 2 MG/ML
4 INJECTION INTRAMUSCULAR; INTRAVENOUS DAILY PRN
Status: DISCONTINUED | OUTPATIENT
Start: 2023-09-25 | End: 2023-09-25 | Stop reason: HOSPADM

## 2023-09-25 RX ORDER — DEXAMETHASONE SODIUM PHOSPHATE 10 MG/ML
INJECTION INTRAMUSCULAR; INTRAVENOUS
Status: COMPLETED | OUTPATIENT
Start: 2023-09-25 | End: 2023-09-25

## 2023-09-25 RX ORDER — ROCURONIUM BROMIDE 10 MG/ML
INJECTION, SOLUTION INTRAVENOUS
Status: DISCONTINUED | OUTPATIENT
Start: 2023-09-25 | End: 2023-09-25

## 2023-09-25 RX ORDER — HYDROMORPHONE HYDROCHLORIDE 2 MG/ML
0.4 INJECTION, SOLUTION INTRAMUSCULAR; INTRAVENOUS; SUBCUTANEOUS EVERY 5 MIN PRN
Status: DISCONTINUED | OUTPATIENT
Start: 2023-09-25 | End: 2023-09-25 | Stop reason: HOSPADM

## 2023-09-25 RX ORDER — PROPOFOL 10 MG/ML
VIAL (ML) INTRAVENOUS
Status: DISCONTINUED | OUTPATIENT
Start: 2023-09-25 | End: 2023-09-25

## 2023-09-25 RX ORDER — LIDOCAINE HYDROCHLORIDE 20 MG/ML
INJECTION INTRAVENOUS
Status: DISCONTINUED | OUTPATIENT
Start: 2023-09-25 | End: 2023-09-25

## 2023-09-25 RX ORDER — ROPIVACAINE HYDROCHLORIDE 5 MG/ML
INJECTION, SOLUTION EPIDURAL; INFILTRATION; PERINEURAL
Status: COMPLETED | OUTPATIENT
Start: 2023-09-25 | End: 2023-09-25

## 2023-09-25 RX ORDER — MIDAZOLAM HYDROCHLORIDE 1 MG/ML
2 INJECTION INTRAMUSCULAR; INTRAVENOUS ONCE
Status: COMPLETED | OUTPATIENT
Start: 2023-09-25 | End: 2023-09-25

## 2023-09-25 RX ORDER — ONDANSETRON HYDROCHLORIDE 2 MG/ML
INJECTION, SOLUTION INTRAMUSCULAR; INTRAVENOUS
Status: DISCONTINUED | OUTPATIENT
Start: 2023-09-25 | End: 2023-09-25

## 2023-09-25 RX ORDER — ACETAMINOPHEN 10 MG/ML
INJECTION, SOLUTION INTRAVENOUS
Status: DISCONTINUED | OUTPATIENT
Start: 2023-09-25 | End: 2023-09-25

## 2023-09-25 RX ORDER — MIDAZOLAM HYDROCHLORIDE 1 MG/ML
INJECTION INTRAMUSCULAR; INTRAVENOUS
Status: DISCONTINUED | OUTPATIENT
Start: 2023-09-25 | End: 2023-09-25

## 2023-09-25 RX ORDER — DEXAMETHASONE SODIUM PHOSPHATE 4 MG/ML
8 INJECTION, SOLUTION INTRA-ARTICULAR; INTRALESIONAL; INTRAMUSCULAR; INTRAVENOUS; SOFT TISSUE ONCE
Status: DISCONTINUED | OUTPATIENT
Start: 2023-09-25 | End: 2023-09-25 | Stop reason: HOSPADM

## 2023-09-25 RX ORDER — MEPERIDINE HYDROCHLORIDE 25 MG/ML
6.25 INJECTION INTRAMUSCULAR; INTRAVENOUS; SUBCUTANEOUS ONCE AS NEEDED
Status: DISCONTINUED | OUTPATIENT
Start: 2023-09-25 | End: 2023-09-25 | Stop reason: HOSPADM

## 2023-09-25 RX ORDER — ROPIVACAINE HYDROCHLORIDE 5 MG/ML
40 INJECTION, SOLUTION EPIDURAL; INFILTRATION; PERINEURAL ONCE
Status: DISCONTINUED | OUTPATIENT
Start: 2023-09-25 | End: 2023-09-25 | Stop reason: HOSPADM

## 2023-09-25 RX ORDER — MIDAZOLAM HYDROCHLORIDE 1 MG/ML
INJECTION INTRAMUSCULAR; INTRAVENOUS
Status: COMPLETED
Start: 2023-09-25 | End: 2023-09-25

## 2023-09-25 RX ORDER — DEXAMETHASONE SODIUM PHOSPHATE 4 MG/ML
INJECTION, SOLUTION INTRA-ARTICULAR; INTRALESIONAL; INTRAMUSCULAR; INTRAVENOUS; SOFT TISSUE
Status: DISCONTINUED | OUTPATIENT
Start: 2023-09-25 | End: 2023-09-25

## 2023-09-25 RX ORDER — PROCHLORPERAZINE EDISYLATE 5 MG/ML
5 INJECTION INTRAMUSCULAR; INTRAVENOUS EVERY 30 MIN PRN
Status: DISCONTINUED | OUTPATIENT
Start: 2023-09-25 | End: 2023-09-25 | Stop reason: HOSPADM

## 2023-09-25 RX ORDER — FENTANYL CITRATE 50 UG/ML
INJECTION, SOLUTION INTRAMUSCULAR; INTRAVENOUS
Status: DISCONTINUED | OUTPATIENT
Start: 2023-09-25 | End: 2023-09-25

## 2023-09-25 RX ADMIN — GABAPENTIN 300 MG: 300 CAPSULE ORAL at 08:09

## 2023-09-25 RX ADMIN — FAMOTIDINE 20 MG: 20 TABLET, FILM COATED ORAL at 02:09

## 2023-09-25 RX ADMIN — CEFAZOLIN SODIUM 2 G: 2 SOLUTION INTRAVENOUS at 05:09

## 2023-09-25 RX ADMIN — ENOXAPARIN SODIUM 40 MG: 40 INJECTION SUBCUTANEOUS at 05:09

## 2023-09-25 RX ADMIN — MIDAZOLAM HYDROCHLORIDE 2 MG: 1 INJECTION INTRAMUSCULAR; INTRAVENOUS at 07:09

## 2023-09-25 RX ADMIN — FENTANYL CITRATE 50 MCG: 50 INJECTION, SOLUTION INTRAMUSCULAR; INTRAVENOUS at 07:09

## 2023-09-25 RX ADMIN — Medication 6 MG: at 08:09

## 2023-09-25 RX ADMIN — SODIUM CHLORIDE, SODIUM GLUCONATE, SODIUM ACETATE, POTASSIUM CHLORIDE AND MAGNESIUM CHLORIDE: 526; 502; 368; 37; 30 INJECTION, SOLUTION INTRAVENOUS at 07:09

## 2023-09-25 RX ADMIN — LIDOCAINE HYDROCHLORIDE 80 MG: 20 INJECTION INTRAVENOUS at 07:09

## 2023-09-25 RX ADMIN — SUGAMMADEX 200 MG: 100 INJECTION, SOLUTION INTRAVENOUS at 08:09

## 2023-09-25 RX ADMIN — OXYCODONE HYDROCHLORIDE 10 MG: 10 TABLET ORAL at 08:09

## 2023-09-25 RX ADMIN — MIDAZOLAM HYDROCHLORIDE 2 MG: 1 INJECTION, SOLUTION INTRAMUSCULAR; INTRAVENOUS at 07:09

## 2023-09-25 RX ADMIN — DOCUSATE SODIUM 200 MG: 100 CAPSULE, LIQUID FILLED ORAL at 02:09

## 2023-09-25 RX ADMIN — HYDROCORTISONE: 10 CREAM TOPICAL at 08:09

## 2023-09-25 RX ADMIN — POLYETHYLENE GLYCOL 3350 17 G: 17 POWDER, FOR SOLUTION ORAL at 08:09

## 2023-09-25 RX ADMIN — ROPIVACAINE HYDROCHLORIDE 30 ML: 5 INJECTION, SOLUTION EPIDURAL; INFILTRATION; PERINEURAL at 07:09

## 2023-09-25 RX ADMIN — GABAPENTIN 300 MG: 300 CAPSULE ORAL at 02:09

## 2023-09-25 RX ADMIN — METHOCARBAMOL 750 MG: 750 TABLET ORAL at 02:09

## 2023-09-25 RX ADMIN — SENNOSIDES AND DOCUSATE SODIUM 2 TABLET: 50; 8.6 TABLET ORAL at 08:09

## 2023-09-25 RX ADMIN — PROPOFOL 140 MG: 10 INJECTION, EMULSION INTRAVENOUS at 07:09

## 2023-09-25 RX ADMIN — DEXAMETHASONE SODIUM PHOSPHATE 4 MG: 4 INJECTION, SOLUTION INTRA-ARTICULAR; INTRALESIONAL; INTRAMUSCULAR; INTRAVENOUS; SOFT TISSUE at 07:09

## 2023-09-25 RX ADMIN — FAMOTIDINE 20 MG: 20 TABLET, FILM COATED ORAL at 08:09

## 2023-09-25 RX ADMIN — OXYCODONE HYDROCHLORIDE 10 MG: 10 TABLET ORAL at 12:09

## 2023-09-25 RX ADMIN — OXYCODONE HYDROCHLORIDE 10 MG: 10 TABLET ORAL at 02:09

## 2023-09-25 RX ADMIN — OXYCODONE HYDROCHLORIDE 10 MG: 10 TABLET ORAL at 05:09

## 2023-09-25 RX ADMIN — DEXAMETHASONE SODIUM PHOSPHATE 4 MG: 10 INJECTION, SOLUTION INTRAMUSCULAR; INTRAVENOUS at 07:09

## 2023-09-25 RX ADMIN — NITROGLYCERIN 0.5 INCH: 20 OINTMENT TOPICAL at 05:09

## 2023-09-25 RX ADMIN — DEXAMETHASONE SODIUM PHOSPHATE 4 MG: 10 INJECTION INTRAMUSCULAR; INTRAVENOUS at 07:09

## 2023-09-25 RX ADMIN — CEFAZOLIN SODIUM 2 G: 2 SOLUTION INTRAVENOUS at 08:09

## 2023-09-25 RX ADMIN — METHOCARBAMOL 750 MG: 750 TABLET ORAL at 08:09

## 2023-09-25 RX ADMIN — ONDANSETRON 4 MG: 2 INJECTION INTRAMUSCULAR; INTRAVENOUS at 07:09

## 2023-09-25 RX ADMIN — SODIUM CHLORIDE, SODIUM GLUCONATE, SODIUM ACETATE, POTASSIUM CHLORIDE AND MAGNESIUM CHLORIDE: 526; 502; 368; 37; 30 INJECTION, SOLUTION INTRAVENOUS at 08:09

## 2023-09-25 RX ADMIN — NITROGLYCERIN 0.5 INCH: 20 OINTMENT TOPICAL at 12:09

## 2023-09-25 RX ADMIN — CEFAZOLIN SODIUM 2 G: 2 SOLUTION INTRAVENOUS at 12:09

## 2023-09-25 RX ADMIN — ACETAMINOPHEN 1000 MG: 10 INJECTION, SOLUTION INTRAVENOUS at 07:09

## 2023-09-25 RX ADMIN — ROCURONIUM BROMIDE 50 MG: 10 SOLUTION INTRAVENOUS at 07:09

## 2023-09-25 RX ADMIN — SENNOSIDES AND DOCUSATE SODIUM 2 TABLET: 50; 8.6 TABLET ORAL at 02:09

## 2023-09-25 NOTE — ANESTHESIA PROCEDURE NOTES
Intubation    Date/Time: 9/25/2023 7:19 AM    Performed by: Joann Jasso CRNA  Authorized by: Tiffani Jasso MD    Intubation:     Induction:  Intravenous    Intubated:  Postinduction    Mask Ventilation:  Easy mask    Attempts:  1    Attempted By:  CRNA    Method of Intubation:  Direct    Blade:  Herrera 2    Laryngeal View Grade: Grade I - full view of cords      Difficult Airway Encountered?: No      Complications:  None    Airway Device:  Oral endotracheal tube    Airway Device Size:  7.5    Style/Cuff Inflation:  Cuffed    Inflation Amount (mL):  6    Tube secured:  21    Secured at:  The lips    Placement Verified By:  Colorimetric ETCO2 device    Complicating Factors:  None    Findings Post-Intubation:  BS equal bilateral

## 2023-09-25 NOTE — ANESTHESIA PROCEDURE NOTES
Peripheral Block    Patient location during procedure: pre-op   Block not for primary anesthetic.  Reason for block: at surgeon's request and post-op pain management   Post-op Pain Location: Left ankle   Start time: 9/25/2023 7:05 AM  Timeout: 9/25/2023 7:00 AM   End time: 9/25/2023 7:06 AM    Staffing  Authorizing Provider: Tiffani Jasso MD  Performing Provider: Tiffani Jasso MD    Staffing  Performed by: Tiffani Jasso MD  Authorized by: Tiffani Jasso MD    Preanesthetic Checklist  Completed: patient identified, IV checked, site marked, risks and benefits discussed, surgical consent, monitors and equipment checked, pre-op evaluation and timeout performed  Peripheral Block  Patient position: prone  Prep: ChloraPrep and site prepped and draped  Patient monitoring: heart rate, cardiac monitor, continuous pulse ox and frequent blood pressure checks  Block type: popliteal  Laterality: right  Injection technique: single shot  Needle  Needle type: Stimuplex   Needle gauge: 20 G  Needle length: 2 in  Needle localization: anatomical landmarks and ultrasound guidance   -ultrasound image captured on disc.  Assessment  Injection assessment: negative aspiration, negative parasthesia and local visualized surrounding nerve  Paresthesia pain: none  Heart rate change: no  Slow fractionated injection: yes  Pain Tolerance: comfortable throughout block and no complaints  Medications:    Medications: ropivacaine (NAROPIN) injection 0.5% - Perineural   30 mL - 9/25/2023 7:06:00 AM  dexAMETHasone sodium phos (PF) injection 10 mg/mL - Other   4 mg - 9/25/2023 7:06:00 AM

## 2023-09-25 NOTE — ANESTHESIA PREPROCEDURE EVALUATION
09/25/2023  Janeen Arrieta is a 47 y.o., female  S/p fall from ladder 10 days ago and s/p ex-fix now for ORIF of ankle left side      Pre-op Assessment    I have reviewed the NPO Status.      Review of Systems  Anesthesia Hx:  No problems with previous Anesthesia    Cardiovascular:   Exercise tolerance: good    Endocrine:  Denies Diabetes  Denies Thyroid Disease        Physical Exam  General: Well nourished, Cooperative, Alert and Oriented    Airway:  Mallampati: II   Mouth Opening: Normal  TM Distance: Normal  Tongue: Normal  Neck ROM: Normal ROM    Dental:  Intact    Chest/Lungs:  Clear to auscultation, Normal Respiratory Rate    Heart:  Rate: Normal  Rhythm: Regular Rhythm    Musculoskeletal:Left LE ex-fix     Latest Reference Range & Units 09/23/23 00:48   WBC 4.50 - 11.50 x10(3)/mcL 8.70   Hemoglobin 12.0 - 16.0 g/dL 10.9 (L)   Hematocrit 37.0 - 47.0 % 33.7 (L)   Platelets 130 - 400 x10(3)/mcL 341   Sodium 136 - 145 mmol/L 138   Potassium 3.5 - 5.1 mmol/L 4.1   Chloride 98 - 107 mmol/L 103   CO2 22 - 29 mmol/L 28   BUN 7.0 - 18.7 mg/dL 11.7   Creatinine 0.55 - 1.02 mg/dL 0.74   eGFR mls/min/1.73/m2 >60   Glucose 74 - 100 mg/dL 98   (L): Data is abnormally low    Anesthesia Plan  Type of Anesthesia, risks & benefits discussed:    Anesthesia Type: Regional, Gen ETT  Intra-op Monitoring Plan: Standard ASA Monitors  Post Op Pain Control Plan: multimodal analgesia, peripheral nerve block and IV/PO Opioids PRN  Induction:  IV  Informed Consent: Informed consent signed with the Patient and all parties understand the risks and agree with anesthesia plan.  All questions answered.   ASA Score: 3  Day of Surgery Review of History & Physical: H&P Update referred to the surgeon/provider.  Anesthesia Plan Notes: Premedication: Midazolam  Regional: adductor canal and popliteal  nerve block for postop pain control as  requested by Dr Humphrey___- Ropiv 0.5% 30mls    GETA   PONV prophylaxis    Ready For Surgery From Anesthesia Perspective.     .

## 2023-09-25 NOTE — TRANSFER OF CARE
"Anesthesia Transfer of Care Note    Patient: Janeen Arrieta    Procedure(s) Performed: Procedure(s) (LRB):  FUSION, JOINT, ANKLE (Left)    Patient location: PACU    Anesthesia Type: general    Transport from OR: Transported from OR on room air with adequate spontaneous ventilation    Post pain: adequate analgesia    Post assessment: no apparent anesthetic complications and tolerated procedure well    Post vital signs: stable    Level of consciousness: awake, alert and oriented    Nausea/Vomiting: no nausea/vomiting    Complications: none    Transfer of care protocol was followed      Last vitals:   Visit Vitals  /61   Pulse 76   Temp 36.6 °C (97.9 °F)   Resp 14   Ht 5' 4.02" (1.626 m)   Wt 93.4 kg (206 lb)   SpO2 100%   Breastfeeding No   BMI 35.34 kg/m²     "

## 2023-09-25 NOTE — PROGRESS NOTES
Pt is recovering from her Right pilon.  Patient has a left severely comminuted Pilon when fracture as well.  Currently in a cage fixation.  We have discussed surgical options including fixation fusion hindfoot fusion nail staged fixation out of the ex fix.  With all the information given to the patient she is elected to continue with a hindfoot fusion nail understands she may need to come back for formal fusion and antibiotic cement spaceer removal.  Patient understands risks benefits procedure including chronic pain possible amputation setting of deep infection along with the risks stated below.    I explained that surgery and the nature of their condition are not without risks. These include, but are not limited to, bleeding, infection, neurovascular compromise, malunion, nonunion, hardware complications, wound complications, scarring, cosmetic defects, need for later and/or repeated surgeries, avascular necrosis, bone death due to initial trauma, pain, loss of ROM, loss of function, PTOA, deformity, stance/gait and/or functional abnormalities, thromboembolic complications, compartment syndrome, loss of limb, loss of life, anesthetic complications, and other imponderables. I explained that these can occur despite the adequacy of treatments rendered, and that their risks are heightened given the nature of their condition. They verbalized understanding. They would like to continue with surgery at this time. If appropriate family was involved with surgical discussion.     This note/OR report was created with the assistance of  voice recognition software or phone  dictation.  There may be transcription errors as a result of using this technology however minimal. Effort has been made to assure accuracy of transcription but any obvious errors or omissions should be clarified with the author of the document.       Ryan Humphrey, DO  Orthopedic Trauma Surgery

## 2023-09-25 NOTE — OP NOTE
OPERATIVE REPORT      Patient: Janeen Arrieta   : 1975    MRN: [id number]  Date: 2023      Surgeon:Ryan Humphrey DO  Assistant: Medardo Rossi was essential, part of the procedure including deep hardware placement and deep closure.  No senior assistant was availible  Preoperative Diagnosis:  Left tibial pilon fracture with severe articular damage  Postoperative Diagnosis: Same  Procedure:  Left tibiotalar arthrodesis  Left subtalar arthrodesis  Left ankle ex fix removal  Anesthesiologist: Tiffani Jasso MD  OR Staff: Circulator: Damien Anthony RN; Karon Perla RN  Physician Assistant: Marli Rossi PA-C  Scrub Person: Ines Noe ST  X-Ray Technologist: Ary Serrano RT  Implants:   Implant Name Type Inv. Item Serial No.  Lot No. LRB No. Used Action   GUIDEWIRE BALL TIP [id number] - [id number]  GUIDEWIRE BALL TIP [id number]  [employer]EN[employer]. [id number] Left 1 Implanted and Explanted   GUIDE WIRE SMOOTH TIP [id number] - [id number]  GUIDE WIRE SMOOTH TIP [id number]  [employer]. [id number] Left 1 Implanted and Explanted   GUIDEWIRE 3.2 X 450 - [id number]  GUIDEWIRE 3.2 X 450  [employer]. [id number] Left 1 Implanted and Explanted   SCREW T2 ANKLE COMPRESSION - [id number]  SCREW T2 ANKLE COMPRESSION  [employer]. [id number] Left 1 Implanted   KIT BONE GRFT BMP SM - [id number]  KIT BONE GRFT BMP SM  [id number] [facility] [id number] Left 1 Implanted   Ex-fix lower left extremity    SYNTHES  Left 6 Explanted   SCREW LOCKING T2 F/T 5X32.5MM - [id number]  SCREW LOCKING T2 F/T 5X32.5MM  [employer]EN[employer]. [id number] Left 1 Implanted     EBL: 50cc  Complications: None  Disposition: To PACU, stable    Indications: Janeen Arrieta is a 47 y.o. female presenting with the aforementioned injuries/findings. The procedure is indicated to provide stability to the ankle and decrease pain.  The patient is awake and alert. After thorough discussion of the risks, benefits,  expected outcomes, and alternatives to surgical intervention, the patient agreed to proceed with surgical treatment.  Specific risks discussed included, but were not limited to: superficial or deep infection, wound healing complications, DVT/PE, significant bleeding requiring transfusion, damage to named anatomic structures in the immediate area including named neurovascular structures, infection, nonunion, malunion and general risks of anesthesia.  The patient voiced understanding and written as well as verbal consent was obtained by myself prior to the procedure.    Patient has severe fracture greater than 5 fragments in the left ankle and also a contralateral distal articular fracture of the tibia.  She is underwent open reduction of the right ankle last week.  She is here today for fixation of the left ankle.  I have discussed with her surgical options including open reduction internal fixation versus fusion versus prolonged external fixator followed by fusion.  She would like to proceed with a hindfoot fusion nail today.      Procedure Note:  The patient was brought back to the OR and placed supine on the OR table. After successful induction of anesthesia by anesthesia staff, the patient was positioned in the supine position and all bony prominences were padded appropriately.  The surgical field was then provisionally cleansed and then prepped and draped in the usual sterile fashion.    At this time a time-out was performed, with the correct patient, site, and procedure identified.  The universal time out as well as sign your site protocols were followed.  Preoperative antibiotics were verified as administered.     Attention is drawn to the left ankle.  I made a small medial incision to access the tibiotalar joint.  Patient has multiple fracture fragments and articular surface floating in the wound.  I removed free cartilaginous pieces from the from the wound proximally 4 different fragments.  I then used  various instruments including a rongeur saw blade curette to prep the joint surface for fusion.  Following this I then placed reduction clamps on the fragments to hold them together through reaming.  I then placed a plantar wire in appropriate position on AP and lateral imaging followed by reaming which prepped the subtalar joint for fusion.  I then placed appropriate size nail after sequential reaming under intraoperative fluoroscopy from the Watch Over Me system.  Satisfied length alignment rotation we then compressed the nail.  Placed appropriate screws proximally and distally we then confirmed alignment.  I then placed a headless compression screw from [place] in the medial side to hold the medial mall fragment in place.    The incision(s) was/were then irrigated using copious sterile saline and then vancomyocin was added to the wound bed for prophylaxis. The surgical wound was closed in layered fashion.  The surgical site(s) was/were were sterilely cleansed and dressed.    The patient was then subsequently transferred to to PACU in a stable condition.    All sponge and needle counts were correct at the end of the case.  I was present and participated in all aspects of the procedure.    Prognosis:  The patient will be kept NWB on the ipsilateral extremity for approximately 12 weeks, RLE 10weeks .  Patient will receive DVT prophylaxis .  Patient has severe bilateral intra-articular ankle injuries that she may lead to chronic pain and even amputation setting of deep infection along with the risks stated prior to surgery.      This note/OR report was created with the assistance of  voice recognition software or phone  dictation.  There may be transcription errors as a result of using this technology however minimal. Effort has been made to assure accuracy of transcription but any obvious errors or omissions should be clarified with the author of the document.       Ryan Humphrey,   Orthopedic Trauma Surgery

## 2023-09-25 NOTE — ANESTHESIA PROCEDURE NOTES
Peripheral Block    Patient location during procedure: pre-op   Block not for primary anesthetic.  Reason for block: at surgeon's request and post-op pain management   Post-op Pain Location: left ankle   Start time: 9/25/2023 7:00 AM  Timeout: 9/25/2023 7:00 AM   End time: 9/25/2023 7:02 AM    Staffing  Authorizing Provider: Tiffani Jasso MD  Performing Provider: Tiffani Jasso MD    Staffing  Performed by: Tiffani Jasso MD  Authorized by: Tiffani Jasso MD    Preanesthetic Checklist  Completed: patient identified, IV checked, site marked, risks and benefits discussed, surgical consent, monitors and equipment checked, pre-op evaluation and timeout performed  Peripheral Block  Patient position: supine  Prep: ChloraPrep  Patient monitoring: heart rate, cardiac monitor, continuous pulse ox, continuous capnometry and frequent blood pressure checks  Block type: adductor canal  Laterality: left  Injection technique: single shot  Needle  Needle type: Stimuplex   Needle gauge: 20 G  Needle length: 4 in  Needle localization: ultrasound guidance and anatomical landmarks   -ultrasound image captured on disc.  Assessment  Injection assessment: negative aspiration, negative parasthesia and local visualized surrounding nerve  Paresthesia pain: none  Heart rate change: no  Slow fractionated injection: yes  Pain Tolerance: comfortable throughout block and no complaints  Medications:    Medications: ropivacaine (NAROPIN) injection 0.5% - Perineural   30 mL - 9/25/2023 7:02:00 AM  dexAMETHasone sodium phos (PF) injection 10 mg/mL - Other   4 mg - 9/25/2023 7:02:00 AM    Additional Notes  VSS.  RN monitoring vitals throughout procedure.  Patient tolerated procedure well.    Ultrasound guidance used to visualize the nerve bundle and sheath as well as confirm needle placement and deposition of the local anesthetic.  (1) Under ultrasound guidance, needle was inserted and placed in close proximity to the nerve  bundle  (2) Ultrasound was also used to visualize the spread of the anesthetic in close proximity to the femoral artery  (3) The nerves appeared anatomically normal  (4) There were no apparent abnormal pathological findings    Ultrasound photos archived.  Pt tolerated procedure well. There were no immediate complications.

## 2023-09-25 NOTE — ANESTHESIA POSTPROCEDURE EVALUATION
Anesthesia Post Evaluation    Patient: Janeen Arrieta    Procedure(s) Performed: Procedure(s) (LRB):  FUSION, JOINT, ANKLE (Left)    Final Anesthesia Type: general      Patient location during evaluation: PACU  Patient participation: Yes- Able to Participate  Level of consciousness: awake and alert  Post-procedure vital signs: reviewed and stable  Pain management: adequate (block working well)  Airway patency: patent    PONV status at discharge: No PONV  Anesthetic complications: no      Cardiovascular status: hemodynamically stable  Respiratory status: unassisted  Hydration status: euvolemic  Follow-up not needed.          Vitals Value Taken Time   /61 09/25/23 0931   Temp 36.6 °C (97.9 °F) 09/25/23 0930   Pulse 69 09/25/23 0940   Resp 10 09/25/23 0940   SpO2 95 % 09/25/23 0940   Vitals shown include unvalidated device data.      Event Time   Out of Recovery 09/25/2023 09:40:00         Pain/Jamal Score: Pain Rating Prior to Med Admin: 8 (9/25/2023  5:04 AM)  Pain Rating Post Med Admin: 2 (9/25/2023  6:04 AM)  Jamal Score: 9 (9/25/2023  9:40 AM)

## 2023-09-25 NOTE — PT/OT/SLP PROGRESS
Occupational Therapy      Patient Name:  Janeen Arrieta   MRN:  [id number]    Patient in OR for sx c ortho. OT to follow up as appropriate.     9/25/2023

## 2023-09-26 LAB
ALBUMIN SERPL-MCNC: 2.9 G/DL (ref 3.5–5)
ALBUMIN/GLOB SERPL: 0.8 RATIO (ref 1.1–2)
ALP SERPL-CCNC: 85 UNIT/L (ref 40–150)
ALT SERPL-CCNC: 10 UNIT/L (ref 0–55)
AST SERPL-CCNC: 13 UNIT/L (ref 5–34)
BASOPHILS # BLD AUTO: 0.04 X10(3)/MCL
BASOPHILS NFR BLD AUTO: 0.3 %
BILIRUB SERPL-MCNC: 0.4 MG/DL
BUN SERPL-MCNC: 10.8 MG/DL (ref 7–18.7)
CALCIUM SERPL-MCNC: 9 MG/DL (ref 8.4–10.2)
CHLORIDE SERPL-SCNC: 106 MMOL/L (ref 98–107)
CO2 SERPL-SCNC: 24 MMOL/L (ref 22–29)
CREAT SERPL-MCNC: 0.74 MG/DL (ref 0.55–1.02)
EOSINOPHIL # BLD AUTO: 0.01 X10(3)/MCL (ref 0–0.9)
EOSINOPHIL NFR BLD AUTO: 0.1 %
ERYTHROCYTE [DISTWIDTH] IN BLOOD BY AUTOMATED COUNT: 13.3 % (ref 11.5–17)
GFR SERPLBLD CREATININE-BSD FMLA CKD-EPI: >60 MLS/MIN/1.73/M2
GLOBULIN SER-MCNC: 3.7 GM/DL (ref 2.4–3.5)
GLUCOSE SERPL-MCNC: 116 MG/DL (ref 74–100)
HCT VFR BLD AUTO: 32 % (ref 37–47)
HGB BLD-MCNC: 10.5 G/DL (ref 12–16)
IMM GRANULOCYTES # BLD AUTO: 0.1 X10(3)/MCL (ref 0–0.04)
IMM GRANULOCYTES NFR BLD AUTO: 0.7 %
LYMPHOCYTES # BLD AUTO: 1.44 X10(3)/MCL (ref 0.6–4.6)
LYMPHOCYTES NFR BLD AUTO: 10.4 %
MCH RBC QN AUTO: 28 PG (ref 27–31)
MCHC RBC AUTO-ENTMCNC: 32.8 G/DL (ref 33–36)
MCV RBC AUTO: 85.3 FL (ref 80–94)
MONOCYTES # BLD AUTO: 1.03 X10(3)/MCL (ref 0.1–1.3)
MONOCYTES NFR BLD AUTO: 7.5 %
NEUTROPHILS # BLD AUTO: 11.18 X10(3)/MCL (ref 2.1–9.2)
NEUTROPHILS NFR BLD AUTO: 81 %
NRBC BLD AUTO-RTO: 0 %
PLATELET # BLD AUTO: 360 X10(3)/MCL (ref 130–400)
PMV BLD AUTO: 9.5 FL (ref 7.4–10.4)
POTASSIUM SERPL-SCNC: 4.1 MMOL/L (ref 3.5–5.1)
PROT SERPL-MCNC: 6.6 GM/DL (ref 6.4–8.3)
RBC # BLD AUTO: 3.75 X10(6)/MCL (ref 4.2–5.4)
SODIUM SERPL-SCNC: 140 MMOL/L (ref 136–145)
WBC # SPEC AUTO: 13.8 X10(3)/MCL (ref 4.5–11.5)

## 2023-09-26 PROCEDURE — 21400001 HC TELEMETRY ROOM

## 2023-09-26 PROCEDURE — 85025 COMPLETE CBC W/AUTO DIFF WBC: CPT | Performed by: PHYSICIAN ASSISTANT

## 2023-09-26 PROCEDURE — 63600175 PHARM REV CODE 636 W HCPCS

## 2023-09-26 PROCEDURE — 97164 PT RE-EVAL EST PLAN CARE: CPT

## 2023-09-26 PROCEDURE — 25000003 PHARM REV CODE 250: Performed by: PHYSICIAN ASSISTANT

## 2023-09-26 PROCEDURE — 63600175 PHARM REV CODE 636 W HCPCS: Performed by: PHYSICIAN ASSISTANT

## 2023-09-26 PROCEDURE — 80053 COMPREHEN METABOLIC PANEL: CPT | Performed by: PHYSICIAN ASSISTANT

## 2023-09-26 PROCEDURE — 25000003 PHARM REV CODE 250

## 2023-09-26 PROCEDURE — 97168 OT RE-EVAL EST PLAN CARE: CPT

## 2023-09-26 RX ORDER — KETOROLAC TROMETHAMINE 30 MG/ML
15 INJECTION, SOLUTION INTRAMUSCULAR; INTRAVENOUS EVERY 6 HOURS
Status: COMPLETED | OUTPATIENT
Start: 2023-09-26 | End: 2023-09-27

## 2023-09-26 RX ORDER — GABAPENTIN 300 MG/1
300 CAPSULE ORAL 3 TIMES DAILY
Status: DISCONTINUED | OUTPATIENT
Start: 2023-09-26 | End: 2023-09-28 | Stop reason: HOSPADM

## 2023-09-26 RX ORDER — ACETAMINOPHEN 10 MG/ML
1000 INJECTION, SOLUTION INTRAVENOUS ONCE
Status: COMPLETED | OUTPATIENT
Start: 2023-09-26 | End: 2023-09-26

## 2023-09-26 RX ADMIN — DOCUSATE SODIUM 200 MG: 100 CAPSULE, LIQUID FILLED ORAL at 08:09

## 2023-09-26 RX ADMIN — KETOROLAC TROMETHAMINE 15 MG: 30 INJECTION, SOLUTION INTRAMUSCULAR; INTRAVENOUS at 11:09

## 2023-09-26 RX ADMIN — NITROGLYCERIN 0.5 INCH: 20 OINTMENT TOPICAL at 05:09

## 2023-09-26 RX ADMIN — SENNOSIDES AND DOCUSATE SODIUM 2 TABLET: 50; 8.6 TABLET ORAL at 08:09

## 2023-09-26 RX ADMIN — ENOXAPARIN SODIUM 40 MG: 40 INJECTION SUBCUTANEOUS at 05:09

## 2023-09-26 RX ADMIN — ACETAMINOPHEN 1000 MG: 10 INJECTION, SOLUTION INTRAVENOUS at 08:09

## 2023-09-26 RX ADMIN — OXYCODONE HYDROCHLORIDE 10 MG: 10 TABLET ORAL at 05:09

## 2023-09-26 RX ADMIN — HYDROCORTISONE: 10 CREAM TOPICAL at 08:09

## 2023-09-26 RX ADMIN — METHOCARBAMOL 750 MG: 750 TABLET ORAL at 08:09

## 2023-09-26 RX ADMIN — OXYCODONE HYDROCHLORIDE 10 MG: 10 TABLET ORAL at 12:09

## 2023-09-26 RX ADMIN — FAMOTIDINE 20 MG: 20 TABLET, FILM COATED ORAL at 08:09

## 2023-09-26 RX ADMIN — GABAPENTIN 300 MG: 300 CAPSULE ORAL at 08:09

## 2023-09-26 RX ADMIN — OXYCODONE HYDROCHLORIDE 10 MG: 10 TABLET ORAL at 08:09

## 2023-09-26 RX ADMIN — CEFAZOLIN SODIUM 2 G: 2 SOLUTION INTRAVENOUS at 12:09

## 2023-09-26 RX ADMIN — KETOROLAC TROMETHAMINE 15 MG: 30 INJECTION, SOLUTION INTRAMUSCULAR; INTRAVENOUS at 05:09

## 2023-09-26 RX ADMIN — CEFAZOLIN SODIUM 2 G: 2 SOLUTION INTRAVENOUS at 07:09

## 2023-09-26 RX ADMIN — Medication 6 MG: at 08:09

## 2023-09-26 RX ADMIN — CEFAZOLIN SODIUM 2 G: 2 SOLUTION INTRAVENOUS at 03:09

## 2023-09-26 RX ADMIN — NITROGLYCERIN 0.5 INCH: 20 OINTMENT TOPICAL at 12:09

## 2023-09-26 RX ADMIN — OXYCODONE HYDROCHLORIDE 10 MG: 10 TABLET ORAL at 02:09

## 2023-09-26 RX ADMIN — NITROGLYCERIN 0.5 INCH: 20 OINTMENT TOPICAL at 11:09

## 2023-09-26 RX ADMIN — CEFAZOLIN SODIUM 2 G: 2 SOLUTION INTRAVENOUS at 11:09

## 2023-09-26 RX ADMIN — METHOCARBAMOL 750 MG: 750 TABLET ORAL at 02:09

## 2023-09-26 RX ADMIN — GABAPENTIN 300 MG: 300 CAPSULE ORAL at 02:09

## 2023-09-26 NOTE — PROGRESS NOTES
[facility] - Ortho Neuro  Orthopedics  Progress Note    Patient Name: Janeen Arrieta  MRN: [id number]  Admission Date: 9/15/2023  Hospital Length of Stay: 11 days  Attending Provider: Ryan Humphrey DO  Primary Care Provider: Andrea Norman MD  Follow-up For: Procedure(s) (LRB):  ORIF, FRACTURE, PILON (Right)  REVISION, OF EXTERNAL FIXATION (Left)    Post-Operative Day: 1 Days Post-Op  Subjective:     Principal Problem:<principal problem not specified>    Principal Orthopedic Problem: 1 Day Post-Op hindfoot fusion with prep of joint left pilon ankle fracture; POD 8 ORIF right pilon ankle fracture.    Interval History: Patient POD1 hindfoot fusion nail left pilon ankle fracture Elevated on wedge. Pain is higher today. States she had a rough night. Some nerve pain. No numbness or tingling distally. Some burning and itching at her right thigh varicose veins slightly improved.  Doing okay overall. No further ortho surgery planned    Review of patient's allergies indicates:  No Known Allergies    Current Facility-Administered Medications   Medication    0.9%  NaCl infusion    0.9%  NaCl infusion    acetaminophen tablet 650 mg    aluminum-magnesium hydroxide-simethicone 200-200-20 mg/5 mL suspension 30 mL    cefazolin (ANCEF) 2 gram in dextrose 5% 50 mL IVPB (premix)    docusate sodium capsule 200 mg    electrolyte-A infusion    electrolyte-A infusion    enoxaparin injection 40 mg    famotidine tablet 20 mg    gabapentin capsule 300 mg    hydrocortisone 1 % cream    ketorolac injection 15 mg    lactated ringers infusion    lactulose 20 gram/30 mL solution Soln 20 g    melatonin tablet 6 mg    methocarbamoL tablet 750 mg    nitroGLYCERIN 2% TD oint ointment 0.5 inch    ondansetron injection 4 mg    ondansetron injection 4 mg    oxyCODONE immediate release tablet 5 mg    oxyCODONE immediate release tablet Tab 10 mg    polyethylene glycol packet 17 g    senna-docusate 8.6-50 mg per tablet 2 tablet     "sodium chloride 0.9% flush 10 mL    sodium chloride 0.9% flush 3 mL     Objective:     Vital Signs (Most Recent):  Temp: 97.9 °F (36.6 °C) (09/26/23 1043)  Pulse: 75 (09/26/23 1043)  Resp: 18 (09/26/23 1043)  BP: 133/80 (09/26/23 1043)  SpO2: 99 % (09/26/23 1043) Vital Signs (24h Range):  Temp:  [97.4 °F (36.3 °C)-98.2 °F (36.8 °C)] 97.9 °F (36.6 °C)  Pulse:  [] 75  Resp:  [16-20] 18  SpO2:  [94 %-100 %] 99 %  BP: (102-133)/(64-80) 133/80     Weight: 93.4 kg (206 lb)  Height: 5' 4.02" (162.6 cm)  Body mass index is 35.34 kg/m².      Intake/Output Summary (Last 24 hours) at 9/26/2023 1238  Last data filed at 9/26/2023 1145  Gross per 24 hour   Intake 580 ml   Output 5375 ml   Net -4795 ml         Physical Exam:   Musculoskeletal:       Right lower extremity: Incisions clean and dry without erythema, drainage, signs of dehiscence, open to air; compartments are soft and compressible; Tolerates gentle passive ROM at the ankle and knee,appropriate tenderness to palpation; dorsi/plantar flexes the foot; SILT distally; BCR distally; DP pulse palpable; overall very good range of motion    Left lower extremity: Soft dressing in place CDI; compartments are soft and compressible; No ankle ROM attempted, able to flex and extend all digits; appropriate tenderness to palpation; SILT distally; BCR distally; DP pulse palpable      Diagnostic Findings:   Significant Labs:   Recent Lab Results         09/26/23  0432        Albumin/Globulin Ratio 0.8       Albumin 2.9       Alkaline Phosphatase 85       ALT 10       AST 13       Baso # 0.04       Basophil % 0.3       BILIRUBIN TOTAL 0.4       BUN 10.8       Calcium 9.0       Chloride 106       CO2 24       Creatinine 0.74       eGFR >60       Eos # 0.01       Eosinophil % 0.1       Globulin, Total 3.7       Glucose 116       Hematocrit 32.0       Hemoglobin 10.5       Immature Grans (Abs) 0.10       Immature Granulocytes 0.7       Lymph # 1.44       LYMPH % 10.4       MCH 28.0   "     MCHC 32.8       MCV 85.3       Mono # 1.03       Mono % 7.5       MPV 9.5       Neut # 11.18       Neut % 81.0       nRBC 0.0       Platelets 360       Potassium 4.1       PROTEIN TOTAL 6.6       RBC 3.75       RDW 13.3       Sodium 140       WBC 13.80                Significant Imaging: I have reviewed and interpreted all pertinent imaging results/findings.     Assessment/Plan:     Active Diagnoses:    Diagnosis Date Noted POA    Moderate malnutrition [E44.0] 09/21/2023 Yes    Closed displaced pilon fracture of right tibia [S82.871A] 09/17/2023 Yes    Closed displaced pilon fracture of left tibia [S82.872A] 09/17/2023 Yes      Problems Resolved During this Admission:   Labs stable at this time.  Daily dry dressing changes bilateral ankles  Surgery completed at this time.  Joint prepped for fusion with BMP placed in joint.   Lovenox for DVT ppx during stay and x 90 days upon discharge.   Appreciate CM help with her medicaid application and likely rehab needs following this hospital stay.   We will continue to monitor her daily.  Progress diet as tolerated today.  Will continue to follow through her stay.  Will continue ancef x 24 hours.   Possible DC home as early as tomorrow vs to rehab.      The above findings, diagnostics, and treatment plan were discussed with Dr. Humphrey who is in agreement with the plan of care except as stated in additional documentation.      Marli Rossi PA-C  Orthopedic Trauma Surgery  [facility]

## 2023-09-26 NOTE — PT/OT/SLP RE-EVAL
Physical Therapy Re-Evaluation    Patient Name:  Janeen Arrieta   MRN:  [id number]    Recommendations:     Discharge Recommendations: home health PT   Discharge Equipment Recommendations: bedside commode, bath bench, slide board (drop arm commode)   Barriers to discharge: None    Assessment:     Janeen Arrieta is a 47 y.o. female admitted with a medical diagnosis of <principal problem not specified>.  She presents with the following impairments/functional limitations: weakness, impaired self care skills, decreased lower extremity function, pain, decreased safety awareness. Patient s/p left tibiotalar and subtalar arthrodesis and left ankle ex-fix removal 9/25. Patient has progressed very well with PT. SBA for all mobility. I believe she is too high level for rehab. Recommending HH PT at discharge. Continue to progress patient as tolerated.     Rehab Prognosis: Good; patient would benefit from acute skilled PT services to address these deficits and reach maximum level of function.    Recent Surgery: Procedure(s) (LRB):  FUSION, JOINT, ANKLE (Left) 1 Day Post-Op    Plan:     During this hospitalization, patient to be seen 6 x/week to address the identified rehab impairments via gait training, therapeutic activities, therapeutic exercises, neuromuscular re-education and progress toward the following goals:    Plan of Care Expires:  10/16/23    Subjective     Chief Complaint: pain  Patient/Family Comments/goals: none  Pain/Comfort:  Pain Rating 1: 7/10  Location - Side 1: Left  Location 1: calf  Pain Addressed 1: Reposition, Distraction  Pain Rating Post-Intervention 1: 7/10    Patients cultural, spiritual, Scientology conflicts given the current situation: no    Objective:     Communicated with nurse prior to session.  Patient found supine with telemetry  upon PT entry to room.    General Precautions: Standard, fall  Orthopedic Precautions:RLE non weight bearing, LLE non weight bearing   Braces:  (CAM boot on  RLE)  Respiratory Status: Room air      Exams:  Cognitive Exam:  Patient is oriented to Person, Place, Time, and Situation  Sensation: -       Intact  RLE ROM: WFL except ankle not assessed  RLE Strength: WFL except ankle not assessed  LLE ROM: WFL except ankle not assessed  LLE Strength: WFL except ankle not assessed  Skin integrity: Visible skin intact      Functional Mobility:  Bed Mobility:     Supine to Sit: stand by assistance  Transfers:     Bed to wheelchair: stand by assistance with  slide board    Balance: fair  WC mobility: 145 ft with SBA      AM-PAC 6 CLICK MOBILITY  Total Score:18     Education Provided:  Role and goals of PT, transfer training, bed mobility, balance training, safety awareness, assistive device, strengthening exercises, and importance of participating in PT to return to PLOF    Patient left in wheelchair with all lines intact and call button in reach.    GOALS:   Multidisciplinary Problems       Physical Therapy Goals          Problem: Physical Therapy    Goal Priority Disciplines Outcome Goal Variances Interventions   Physical Therapy Goal     PT, PT/OT Ongoing, Progressing     Description: Goals to be met by: 10/16/23     Patient will increase functional independence with mobility by performin. Supine to sit with [place]  2. Bed to chair transfer mod Ind via slide board  3. Pt Independent with WC mobility using BILL UE propulsion x 300ft.                         History:     History reviewed. No pertinent past medical history.    Past Surgical History:   Procedure Laterality Date    APPLICATION, EXTERNAL FIXATION DEVICE Bilateral 9/15/2023    Procedure: APPLICATION, EXTERNAL FIXATION DEVICE;  Surgeon: Felix Charles MD;  Location: [facility];  Service: Orthopedics;  Laterality: Bilateral;  PUNCTURE WOUNDS ONLY    FUSION, JOINT, ANKLE Left 2023    Procedure: FUSION, JOINT, ANKLE;  Surgeon: Ryan Humphrey DO;  Location: [facility];  Service: Orthopedics;  Laterality: Left;   supine vascular bone foam c arm synthes to remove ex fix and then judy hindfoot fusion nail    OPEN REDUCTION AND INTERNAL FIXATION (ORIF) OF PILON FRACTURE Right 9/17/2023    Procedure: ORIF, FRACTURE, PILON;  Surgeon: Ryan Humphrey DO;  Location: [facility];  Service: Orthopedics;  Laterality: Right;  Supine vascular bed bone foam c arm Manipulate Ex fix on L with Yamel Ex fix stuff, Fix Right ankle. Need to remova  ex fix, Synthes medial pilon plate, CCHS 4.0    REVISION OF PROCEDURE INVOLVING EXTERNAL FIXATION DEVICE Left 9/17/2023    Procedure: REVISION, OF EXTERNAL FIXATION;  Surgeon: Rayn Humphrey DO;  Location: [facility];  Service: Orthopedics;  Laterality: Left;       Time Tracking:     PT Received On: 09/26/23  PT Start Time: 0954     PT Stop Time: 1014  PT Total Time (min): 20 min     Billable Minutes: Re-eval 20 minutes      09/26/2023

## 2023-09-26 NOTE — PLAN OF CARE
Patient remains self pay. Patient has received out of pocket cost with [employer] for slide board and drop arm BSC on 9/20 by JAYNE SERRANO Plan to dc patient home with home health when medically stable.     Spoke with Radhika (financial counselor) [phone] she informed CM patient has applied for Medicaid on her own. Patient received denial on 9/22/23 due to her income. Financial counselor offered to do a spend down with Medicaid and present medical expenses on patient's behalf. Patient refused and wanted to appeal Medicaid on her own.

## 2023-09-26 NOTE — PROGRESS NOTES
Inpatient Nutrition Assessment    Admit Date: 9/15/2023   Total duration of encounter: 11 days     Nutrition Recommendation/Prescription     Continue regular diet as tolerated  If appetite poor, consider oral supplement  RD to monitor po intake and weight    Communication of Recommendations: reviewed with patient    Nutrition Assessment     Malnutrition Assessment/Nutrition-Focused Physical Exam    Malnutrition Context: acute illness or injury  Malnutrition Level: moderate  Energy Intake (Malnutrition): less than 75% for greater than 7 days  Weight Loss (Malnutrition):  (does not meet criteria)  Subcutaneous Fat (Malnutrition): moderate depletion  [place] Region (Subcutaneous Fat Loss): moderate depletion        Muscle Mass (Malnutrition): moderate depletion  [place] (Muscle Loss): moderate depletion                                A minimum of two characteristics is recommended for diagnosis of either severe or non-severe malnutrition.    Chart Review    Reason Seen: length of stay    Malnutrition Screening Tool Results   Have you recently lost weight without trying?: No  Have you been eating poorly because of a decreased appetite?: No   MST Score: 0     Diagnosis:  Closed displaced pilon fracture of right tibia s/p ORIF POD 3  Closed displaced pilon fracture of left tibia s/p ex fix POD 4    Relevant Medical History: n/a    Nutrition-Related Medications: colace, Pepcid, miralax, senna  Calorie Containing IV Medications: no significant kcals from medications at this time    Nutrition-Related Labs:   9/26: Glu 116, Alb 2.9      Diet/PN Order: Diet Adult Regular  Oral Supplement Order: none  Tube Feeding Order: none  Appetite/Oral Intake: fair/50-75% of meals  Factors Affecting Nutritional Intake: decreased appetite  Food/Cheondoism/Cultural Preferences: none reported  Food Allergies: none reported    Skin Integrity: bruised (ecchymotic)  Wound(s):       Comments    9/21: pt reports fair appetite since admission,  "as well as prior for unknown amount of time. Pt states was eating 2 meals/day d/t being busy a lot lately. Denied need for ONS at this time. Encouraged pt to order food based on food preferences to increase po intake. No GI issues reported. UBW reported ~206 lb with few lb weight loss prior.    : eating 75% of meals; no GI concerns    Anthropometrics    Height: 5' 4.02" (162.6 cm) Height Method: Stated  Last Weight: 93.4 kg (206 lb) (23 0735) Weight Method: Bed Scale  BMI (Calculated): 35.3  BMI Classification: obese grade II (BMI 35-39.9)     Ideal Body Weight (IBW), Female: 120.1 lb     % Ideal Body Weight, Female (lb): 171.52 %                    Usual Body Weight (UBW), k.4 kg  % Usual Body Weight: 100.25     Usual Weight Provided By: patient    Wt Readings from Last 5 Encounters:   23 93.4 kg (206 lb)     Weight Change(s) Since Admission:  Admit Weight: 93.4 kg (206 lb) (09/15/23 1458)      Estimated Needs    Weight Used For Calorie Calculations: 93.4 kg (205 lb 14.6 oz)  Energy Calorie Requirements (kcal): 1853 kcal (1.2 stress factor)  Energy Need Method: Onward-St Jeor  Weight Used For Protein Calculations: 93.4 kg (205 lb 14.6 oz)  Protein Requirements:  g (1.0-1.2 g/kg)  Fluid Requirements (mL): 1853 mL (1 mL/kcal)  Temp (24hrs), Av.9 °F (36.6 °C), Min:97.4 °F (36.3 °C), Max:98.2 °F (36.8 °C)       Enteral Nutrition    Patient not receiving enteral nutrition at this time.    Parenteral Nutrition    Patient not receiving parenteral nutrition support at this time.    Evaluation of Received Nutrient Intake    Calories: meeting estimated needs  Protein: meeting estimated needs    Patient Education    Not applicable.    Nutrition Diagnosis     PES: Malnutrition related to acute illness as evidenced by moderate muscle/fat depletion and <75% energy intake for > 7 days. (continues)    Interventions/Goals     Intervention(s): general/healthful diet, commercial beverage, and " collaboration with other providers  Goal: Meet greater than 75% of nutritional needs by follow-up. (goal met)    Monitoring & Evaluation     Dietitian will monitor energy intake and weight.  Nutrition Risk/Follow-Up: moderate (follow-up in 3-5 days)   Please consult if re-assessment needed sooner.

## 2023-09-26 NOTE — PLAN OF CARE
Problem: Physical Therapy  Goal: Physical Therapy Goal  Description: Goals to be met by: 10/16/23     Patient will increase functional independence with mobility by performin. Supine to sit with Bruno  2. Bed to chair transfer mod Ind via slide board  3. Pt Independent with WC mobility using BILL UE propulsion x 300ft.    Outcome: Ongoing, Progressing

## 2023-09-26 NOTE — PT/OT/SLP RE-EVAL
Occupational Therapy   Re-evaluation    Name: Janeen Arrieta  MRN: [id number]  Admitting Diagnosis:   Recent Surgery: Procedure(s) (LRB):  FUSION, JOINT, ANKLE (Left) 1 Day Post-Op    Recommendations:     Discharge Recommendations: home with home health  Discharge Equipment Recommendations:  other (see comments) (bariatric BSC)  Barriers to discharge:       Assessment:     Janeen Arrieta is a 47 y.o. female with a medical diagnosis of L tibiotalar arthrodesis, L subtalai arthrodesis, L ankle ex-fix removal.  She presents with the following performance deficits affecting function: weakness, impaired endurance, impaired self care skills, impaired functional mobility.  Upon discharge, this patient would benefit from HH.    Rehab Prognosis: Good; patient would benefit from acute skilled OT services to address these deficits and reach maximum level of function.       Plan:     Patient to be seen 3 x/week to address the above listed problems via self-care/home management, therapeutic activities, therapeutic exercises  Plan of Care Expires: 10/26/23  Plan of Care Reviewed with: patient    Subjective       Occupational Profile:  Living Environment: lives with  tub shower   Previous level of function: indep   Roles and Routines: wife   Equipment Used at Home: slide board, bath bench, walker, rolling, wheelchair  Assistance upon Discharge: family     Pain/Comfort:  Pain Rating 1: 10/10  Location - Side 1: Left  Pain Addressed 1: Reposition    Patients cultural, spiritual, Restorationist conflicts given the current situation:      Objective:     OT communicated with nrsg prior to session.      Patient was found HOB elevated with   upon OT entry to room.    General Precautions: Standard,    Orthopedic Precautions: RLE non weight bearing, LLE non weight bearing  Braces:  (CAM boot on RLE)    Vital Signs:     Bed Mobility:    Patient completed Supine to Sit with stand by assistance    Functional Mobility/Transfers:  Patient  completed Bed <> Chair Transfer using Slide Board technique with contact guard assistance with slide board and wheelchair  Functional Mobility: good technique     Activities of Daily Living:  Lower Body Dressing: maximal assistance -recommend education tomorrow on don/doff in bed for pants/underwear   Bs Droparm ordered today, recommend practice tomorrow with slide board     [facility] 6 Click ADL:  [facility] Total Score:      Functional Cognition:  Intact    Visual Perceptual Skills:      Upper Extremity Function:  Right Upper Extremity:   WFL    Left Upper Extremity:  WFL    Balance:   Static sitting balance: WFL  Dynamic sitting balance:WFL    Therapeutic Positioning  Wounds to bilar LE and R elbow - known         Patient Education:  Patient provided with verbal education education regarding OT role/goals/POC, post op precautions, fall prevention, safety awareness, and Discharge/DME recommendations.  Understanding was verbalized.     Patient left up in chair with all lines intact and call button in reach    GOALS:   Multidisciplinary Problems       Occupational Therapy Goals          Problem: Occupational Therapy    Goal Priority Disciplines Outcome Interventions   Occupational Therapy Goal     OT, PT/OT Ongoing, Progressing    Description: Goals to be met by: 10/20/23     Patient will increase functional independence with ADLs by performing:    Pt will [place] to [facility] with Mod I.  Pt will perform toileting on [facility] with Mod I for hygiene and clothing management.  Pt will perform LB dressing with adaptive techniques with Mod I.  Pt will transfer to tub bench with or without AD with Mod I.                        History:     History reviewed. No pertinent past medical history.      Past Surgical History:   Procedure Laterality Date    APPLICATION, EXTERNAL FIXATION DEVICE Bilateral 9/15/2023    Procedure: APPLICATION, EXTERNAL FIXATION DEVICE;  Surgeon: Felix Charles MD;  Location: [facility];  Service: Orthopedics;   Laterality: Bilateral;  PUNCTURE WOUNDS ONLY    FUSION, JOINT, ANKLE Left 9/25/2023    Procedure: FUSION, JOINT, ANKLE;  Surgeon: Ryan Humphrey DO;  Location: [facility] OR;  Service: Orthopedics;  Laterality: Left;  supine vascular bone foam c arm synthes to remove ex fix and then judy hindfoot fusion nail    OPEN REDUCTION AND INTERNAL FIXATION (ORIF) OF PILON FRACTURE Right 9/17/2023    Procedure: ORIF, FRACTURE, PILON;  Surgeon: Ryan Humphrey DO;  Location: [facility] OR;  Service: Orthopedics;  Laterality: Right;  Supine vascular bed bone foam c arm Manipulate Ex fix on L with Yamel Ex fix stuff, Fix Right ankle. Need to remova  ex fix, Synthes medial pilon plate, CCHS 4.0    REVISION OF PROCEDURE INVOLVING EXTERNAL FIXATION DEVICE Left 9/17/2023    Procedure: REVISION, OF EXTERNAL FIXATION;  Surgeon: Ryan Humphrey DO;  Location: [facility];  Service: Orthopedics;  Laterality: Left;       Time Tracking:     OT Date of Treatment:    OT Start Time: 1000  OT Stop Time: 1014  OT Total Time (min): 14 min    Billable Minutes:Re-eval 14min     9/26/2023

## 2023-09-27 LAB
ALBUMIN SERPL-MCNC: 3.1 G/DL (ref 3.5–5)
ALBUMIN/GLOB SERPL: 0.8 RATIO (ref 1.1–2)
ALP SERPL-CCNC: 88 UNIT/L (ref 40–150)
ALT SERPL-CCNC: 10 UNIT/L (ref 0–55)
AST SERPL-CCNC: 28 UNIT/L (ref 5–34)
BASOPHILS # BLD AUTO: 0.05 X10(3)/MCL
BASOPHILS NFR BLD AUTO: 0.5 %
BILIRUB SERPL-MCNC: 0.5 MG/DL
BUN SERPL-MCNC: 13.1 MG/DL (ref 7–18.7)
CALCIUM SERPL-MCNC: 8.9 MG/DL (ref 8.4–10.2)
CHLORIDE SERPL-SCNC: 107 MMOL/L (ref 98–107)
CO2 SERPL-SCNC: 23 MMOL/L (ref 22–29)
CREAT SERPL-MCNC: 0.76 MG/DL (ref 0.55–1.02)
EOSINOPHIL # BLD AUTO: 0.22 X10(3)/MCL (ref 0–0.9)
EOSINOPHIL NFR BLD AUTO: 2.4 %
ERYTHROCYTE [DISTWIDTH] IN BLOOD BY AUTOMATED COUNT: 13.6 % (ref 11.5–17)
GFR SERPLBLD CREATININE-BSD FMLA CKD-EPI: >60 MLS/MIN/1.73/M2
GLOBULIN SER-MCNC: 3.7 GM/DL (ref 2.4–3.5)
GLUCOSE SERPL-MCNC: 97 MG/DL (ref 74–100)
HCT VFR BLD AUTO: 32.4 % (ref 37–47)
HGB BLD-MCNC: 10.5 G/DL (ref 12–16)
IMM GRANULOCYTES # BLD AUTO: 0.07 X10(3)/MCL (ref 0–0.04)
IMM GRANULOCYTES NFR BLD AUTO: 0.7 %
LYMPHOCYTES # BLD AUTO: 2.81 X10(3)/MCL (ref 0.6–4.6)
LYMPHOCYTES NFR BLD AUTO: 30.1 %
MCH RBC QN AUTO: 27.6 PG (ref 27–31)
MCHC RBC AUTO-ENTMCNC: 32.4 G/DL (ref 33–36)
MCV RBC AUTO: 85.3 FL (ref 80–94)
MONOCYTES # BLD AUTO: 0.93 X10(3)/MCL (ref 0.1–1.3)
MONOCYTES NFR BLD AUTO: 9.9 %
NEUTROPHILS # BLD AUTO: 5.27 X10(3)/MCL (ref 2.1–9.2)
NEUTROPHILS NFR BLD AUTO: 56.4 %
NRBC BLD AUTO-RTO: 0 %
PLATELET # BLD AUTO: 353 X10(3)/MCL (ref 130–400)
PMV BLD AUTO: 9.8 FL (ref 7.4–10.4)
POTASSIUM SERPL-SCNC: 4.3 MMOL/L (ref 3.5–5.1)
PROT SERPL-MCNC: 6.8 GM/DL (ref 6.4–8.3)
RBC # BLD AUTO: 3.8 X10(6)/MCL (ref 4.2–5.4)
SODIUM SERPL-SCNC: 139 MMOL/L (ref 136–145)
WBC # SPEC AUTO: 9.35 X10(3)/MCL (ref 4.5–11.5)

## 2023-09-27 PROCEDURE — 21400001 HC TELEMETRY ROOM

## 2023-09-27 PROCEDURE — 97530 THERAPEUTIC ACTIVITIES: CPT | Mod: CQ

## 2023-09-27 PROCEDURE — 63600175 PHARM REV CODE 636 W HCPCS: Performed by: PHYSICIAN ASSISTANT

## 2023-09-27 PROCEDURE — 80053 COMPREHEN METABOLIC PANEL: CPT | Performed by: PHYSICIAN ASSISTANT

## 2023-09-27 PROCEDURE — 97535 SELF CARE MNGMENT TRAINING: CPT | Mod: CO

## 2023-09-27 PROCEDURE — 85025 COMPLETE CBC W/AUTO DIFF WBC: CPT | Performed by: PHYSICIAN ASSISTANT

## 2023-09-27 PROCEDURE — 25000003 PHARM REV CODE 250: Performed by: PHYSICIAN ASSISTANT

## 2023-09-27 PROCEDURE — 25000003 PHARM REV CODE 250: Performed by: STUDENT IN AN ORGANIZED HEALTH CARE EDUCATION/TRAINING PROGRAM

## 2023-09-27 PROCEDURE — 25000003 PHARM REV CODE 250

## 2023-09-27 PROCEDURE — 63600175 PHARM REV CODE 636 W HCPCS

## 2023-09-27 RX ADMIN — Medication 6 MG: at 08:09

## 2023-09-27 RX ADMIN — ENOXAPARIN SODIUM 40 MG: 40 INJECTION SUBCUTANEOUS at 06:09

## 2023-09-27 RX ADMIN — METHOCARBAMOL 750 MG: 750 TABLET ORAL at 08:09

## 2023-09-27 RX ADMIN — METHOCARBAMOL 750 MG: 750 TABLET ORAL at 03:09

## 2023-09-27 RX ADMIN — HYDROCORTISONE: 10 CREAM TOPICAL at 08:09

## 2023-09-27 RX ADMIN — OXYCODONE HYDROCHLORIDE 10 MG: 10 TABLET ORAL at 01:09

## 2023-09-27 RX ADMIN — GABAPENTIN 300 MG: 300 CAPSULE ORAL at 03:09

## 2023-09-27 RX ADMIN — CEFAZOLIN SODIUM 2 G: 2 SOLUTION INTRAVENOUS at 05:09

## 2023-09-27 RX ADMIN — OXYCODONE HYDROCHLORIDE 10 MG: 10 TABLET ORAL at 05:09

## 2023-09-27 RX ADMIN — GABAPENTIN 300 MG: 300 CAPSULE ORAL at 08:09

## 2023-09-27 RX ADMIN — SENNOSIDES AND DOCUSATE SODIUM 2 TABLET: 50; 8.6 TABLET ORAL at 08:09

## 2023-09-27 RX ADMIN — NITROGLYCERIN 0.5 INCH: 20 OINTMENT TOPICAL at 11:09

## 2023-09-27 RX ADMIN — FAMOTIDINE 20 MG: 20 TABLET, FILM COATED ORAL at 08:09

## 2023-09-27 RX ADMIN — OXYCODONE HYDROCHLORIDE 5 MG: 5 TABLET ORAL at 03:09

## 2023-09-27 RX ADMIN — OXYCODONE HYDROCHLORIDE 10 MG: 10 TABLET ORAL at 10:09

## 2023-09-27 RX ADMIN — KETOROLAC TROMETHAMINE 15 MG: 30 INJECTION, SOLUTION INTRAMUSCULAR; INTRAVENOUS at 05:09

## 2023-09-27 RX ADMIN — OXYCODONE HYDROCHLORIDE 5 MG: 5 TABLET ORAL at 09:09

## 2023-09-27 RX ADMIN — OXYCODONE HYDROCHLORIDE 10 MG: 10 TABLET ORAL at 03:09

## 2023-09-27 RX ADMIN — DOCUSATE SODIUM 200 MG: 100 CAPSULE, LIQUID FILLED ORAL at 08:09

## 2023-09-27 RX ADMIN — CEFAZOLIN SODIUM 2 G: 2 SOLUTION INTRAVENOUS at 07:09

## 2023-09-27 RX ADMIN — ACETAMINOPHEN 650 MG: 325 TABLET, FILM COATED ORAL at 08:09

## 2023-09-27 NOTE — PLAN OF CARE
Spoke with Ines Durhamwayne RN rep. Informed CM paperwork will have to be filled out to see if patient qualifies to be admitted as indigent. If patient does not qualify she will be billed for services provided by [facility]

## 2023-09-27 NOTE — PROGRESS NOTES
[facility] - Ortho Neuro  Orthopedics  Progress Note    Patient Name: Janeen Arrieta  MRN: [id number]  Admission Date: 9/15/2023  Hospital Length of Stay: 12 days  Attending Provider: Ryan Humphrey DO  Primary Care Provider: Andrea Norman MD  Follow-up For: Procedure(s) (LRB):  ORIF, FRACTURE, PILON (Right)  REVISION, OF EXTERNAL FIXATION (Left)    Post-Operative Day: 2 Days Post-Op  Subjective:     Principal Problem:<principal problem not specified>    Principal Orthopedic Problem: 2 Days Post-Op hindfoot fusion with prep of joint left pilon ankle fracture; POD 8 ORIF right pilon ankle fracture.    Interval History: Patient POD2 hindfoot fusion nail left pilon ankle fracture Elevated on wedge. Pain is higher today. Improved but did have a rough night with pain control last night. No numbness or tingling distally. Some burning pain in the foot. Gabapentin increased yesterday.  Doing okay overall. No further ortho surgery planned    Review of patient's allergies indicates:  No Known Allergies    Current Facility-Administered Medications   Medication    0.9%  NaCl infusion    0.9%  NaCl infusion    acetaminophen tablet 650 mg    aluminum-magnesium hydroxide-simethicone 200-200-20 mg/5 mL suspension 30 mL    cefazolin (ANCEF) 2 gram in dextrose 5% 50 mL IVPB (premix)    docusate sodium capsule 200 mg    electrolyte-A infusion    electrolyte-A infusion    enoxaparin injection 40 mg    famotidine tablet 20 mg    gabapentin capsule 300 mg    hydrocortisone 1 % cream    lactated ringers infusion    lactulose 20 gram/30 mL solution Soln 20 g    melatonin tablet 6 mg    methocarbamoL tablet 750 mg    nitroGLYCERIN 2% TD oint ointment 0.5 inch    ondansetron injection 4 mg    ondansetron injection 4 mg    oxyCODONE immediate release tablet 5 mg    oxyCODONE immediate release tablet Tab 10 mg    polyethylene glycol packet 17 g    senna-docusate 8.6-50 mg per tablet 2 tablet    sodium chloride 0.9% flush 10  "mL    sodium chloride 0.9% flush 3 mL     Objective:     Vital Signs (Most Recent):  Temp: 97.5 °F (36.4 °C) (09/27/23 1501)  Pulse: 87 (09/27/23 1501)  Resp: 16 (09/27/23 1502)  BP: 139/73 (09/27/23 1501)  SpO2: 99 % (09/27/23 1501) Vital Signs (24h Range):  Temp:  [97.5 °F (36.4 °C)-98.3 °F (36.8 °C)] 97.5 °F (36.4 °C)  Pulse:  [] 87  Resp:  [16-18] 16  SpO2:  [94 %-99 %] 99 %  BP: (124-147)/(71-81) 139/73     Weight: 93.4 kg (206 lb)  Height: 5' 4.02" (162.6 cm)  Body mass index is 35.34 kg/m².      Intake/Output Summary (Last 24 hours) at 9/27/2023 1535  Last data filed at 9/27/2023 1525  Gross per 24 hour   Intake 600 ml   Output 2650 ml   Net -2050 ml         Physical Exam:   Musculoskeletal:       Right lower extremity: Incisions clean and dry without erythema, drainage, signs of dehiscence, open to air; compartments are soft and compressible; Tolerates gentle passive ROM at the ankle and knee,appropriate tenderness to palpation; dorsi/plantar flexes the foot; SILT distally; BCR distally; DP pulse palpable; overall very good range of motion    Left lower extremity: island dressings in place; compartments are soft and compressible; No ankle ROM attempted due to fusion nail, able to flex and extend all digits; appropriate tenderness to palpation; SILT distally; BCR distally; DP pulse palpable      Diagnostic Findings:   Significant Labs:   Recent Lab Results         09/27/23  0446   09/26/23  0432        Albumin/Globulin Ratio 0.8   0.8       Albumin 3.1   2.9       Alkaline Phosphatase 88   85       ALT 10   10       AST 28   13       Baso # 0.05   0.04       Basophil % 0.5   0.3       BILIRUBIN TOTAL 0.5   0.4       BUN 13.1   10.8       Calcium 8.9   9.0       Chloride 107   106       CO2 23   24       Creatinine 0.76   0.74       eGFR >60   >60       Eos # 0.22   0.01       Eosinophil % 2.4   0.1       Globulin, Total 3.7   3.7       Glucose 97   116       Hematocrit 32.4   32.0       Hemoglobin " 10.5   10.5       Immature Grans (Abs) 0.07   0.10       Immature Granulocytes 0.7   0.7       Lymph # 2.81   1.44       LYMPH % 30.1   10.4       MCH 27.6   28.0       MCHC 32.4   32.8       MCV 85.3   85.3       Mono # 0.93   1.03       Mono % 9.9   7.5       MPV 9.8   9.5       Neut # 5.27   11.18       Neut % 56.4   81.0       nRBC 0.0   0.0       Platelets 353   360       Potassium 4.3   4.1       PROTEIN TOTAL 6.8   6.6       RBC 3.80   3.75       RDW 13.6   13.3       Sodium 139   140       WBC 9.35   13.80                Significant Imaging: I have reviewed and interpreted all pertinent imaging results/findings.     Assessment/Plan:     Active Diagnoses:    Diagnosis Date Noted POA    Moderate malnutrition [E44.0] 09/21/2023 Yes    Closed displaced pilon fracture of right tibia [S82.871A] 09/17/2023 Yes    Closed displaced pilon fracture of left tibia [S82.872A] 09/17/2023 Yes      Problems Resolved During this Admission:   Labs stable at this time.  Daily dry dressing changes bilateral ankles, soft dressing while in boots  Lovenox for DVT ppx during stay and x 90 days upon discharge.   CM has helped with home needs. Patient will likely DC home with HH and Home PT. She has done well with therapy and mobilization with wheelchair. She will remain NWB to BLE for approx 9 weeks from today  We will continue to monitor her daily.  Will continue ancef x 24 hours. Multimodal pain control as needed  Possible DC home as early as tomorrow  Follow up with Dr. Humphrey in office in 3 weeks for wound check and repeat x rays      The above findings, diagnostics, and treatment plan were discussed with Dr. Humphrey who is in agreement with the plan of care except as stated in additional documentation.      Marli Rossi PA-C  Orthopedic Trauma Surgery  [facility]

## 2023-09-27 NOTE — PLAN OF CARE
09/27/23 1449   Discharge Reassessment   Assessment Type Discharge Planning Reassessment   Did the patient's condition or plan change since previous assessment? Yes   Discharge Plan discussed with: Patient   Communicated PAULINE with patient/caregiver Date not available/Unable to determine   Discharge Plan A Home Health   Discharge Plan B Home Health   DME Needed Upon Discharge  none   Transition of Care Barriers None   Why the patient remains in the hospital Requires continued medical care   Post-Acute Status   Post-Acute Authorization Home Health   Home Health Status Referrals Sent   Discharge Delays None known at this time     Patient has been admitted to hospital under self pay. Home health referral sent to [facility] via [employer], patient made aware. CM spoke with patient and confirmed patient has purchased all necessary medical equipment and only waiting for bariatric BCS with drop bar to be delivered on Friday 9/29/23.

## 2023-09-27 NOTE — PT/OT/SLP PROGRESS
Physical Therapy Treatment    Patient Name:  Janeen Arrieta   MRN:  [id number]    Recommendations:     Discharge Recommendations: home, home with home health  Discharge Equipment Recommendations: slide board, wheelchair, bedside commode, bath bench (platform drop arm)  Barriers to discharge: Ongoing medical needs    Assessment:     Janeen Arrieta is a 47 y.o. female admitted with a medical diagnosis of B/L pilon fx s/p ex fx L side, closed reduction R side following fall from ladder. Per pt's chart and report, pt's ex fix is expected to be removed on 9/23.  She presents with the following impairments/functional limitations: weakness, impaired self care skills, decreased lower extremity function, pain, decreased safety awareness     Rehab Prognosis: Good; patient would benefit from acute skilled PT services to address these deficits and reach maximum level of function.    Recent Surgery: Procedure(s) (LRB):  FUSION, JOINT, ANKLE (Left) 2 Days Post-Op    Plan:     During this hospitalization, patient to be seen 6 x/week to address the identified rehab impairments via gait training, therapeutic activities, therapeutic exercises, neuromuscular re-education and progress toward the following goals:    Plan of Care Expires:  10/16/23    Subjective     Chief Complaint:   Patient/Family Comments/goals:   Pain/Comfort:  Pain Rating 1: 0/10      Objective:     Communicated with NSG prior to session.  Patient found HOB elevated with telemetry upon PT entry to room.     General Precautions: Standard, fall  Orthopedic Precautions: RLE non weight bearing, LLE non weight bearing  Braces:  (CAM boot on RLE)  Respiratory Status: Room air  Blood Pressure:   Skin Integrity: Visible skin intact      Functional Mobility:  Bed Mobility:     Scooting: stand by assistance  Supine to Sit: stand by assistance  Transfers:     Bed to Chair: stand by assistance with  wheelchair  using  Slide Board and pt able to place/remove SB without  assist  Wheelchair Propulsion:  Pt propelled Standard wheelchair x 300ft feet on Level tile with  Bilateral upper extremity with Stand-by Assistance.   Dyn sitting: pt able to assist with donning BL CAM boots SBA.     Patient left up in chair with call button in reach..    GOALS:   Multidisciplinary Problems       Physical Therapy Goals          Problem: Physical Therapy    Goal Priority Disciplines Outcome Goal Variances Interventions   Physical Therapy Goal     PT, PT/OT Ongoing, Progressing     Description: Goals to be met by: 10/16/23     Patient will increase functional independence with mobility by performin. Supine to sit with [place]  2. Bed to chair transfer mod Ind via slide board  3. Pt Independent with WC mobility using BILL UE propulsion x 300ft.                         Time Tracking:     PT Received On: 23  PT Start Time: 1042     PT Stop Time: 1106  PT Total Time (min): 24 min     Billable Minutes: Therapeutic Activity 24    Treatment Type: Treatment  PT/PTA: PTA     Number of PTA visits since last PT visit: 2023    Date Of Previous Biopsy (Optional): 9-13-21

## 2023-09-27 NOTE — PT/OT/SLP PROGRESS
Occupational Therapy   Treatment    Name: Janeen Arrieta  MRN: [id number]  Admitting Diagnosis:  B pilon fx 2 Days Post-Op    Recommendations:     Discharge Recommendations: home with home health  Discharge Equipment Recommendations:  other (see comments) (bariatric BSC)  Barriers to discharge:       Assessment:     Janeen Arrieta is a 47 y.o. female with a medical diagnosis of B pilon fx.  She presents with great ax tolerance and motivation to participate. Independent with bed mobility today and progressing towards goals. Performance deficits affecting function are weakness, impaired endurance, impaired functional mobility, impaired self care skills, orthopedic precautions. Discussed d/c and DME needs, educated on equipment and safety techniques for home use. Verbalized understanding.    Rehab Prognosis:  Good; patient would benefit from acute skilled OT services to address these deficits and reach maximum level of function.       Plan:     Patient to be seen 3 x/week to address the above listed problems via self-care/home management, therapeutic activities, therapeutic exercises  Plan of Care Expires: 10/26/23  Plan of Care Reviewed with: patient    Subjective     Pain/Comfort:  Location - Side 1: Left  Location 1: leg  Pain Addressed 1: Reposition, Distraction    Objective:     Communicated with: RN prior to session.  Patient found supine on bed pan with telemetry upon OT entry to room.    General Precautions: Standard, fall    Orthopedic Precautions:RLE non weight bearing, LLE non weight bearing  Braces:  (CAM boot BLE)  Respiratory Status: Room air     Occupational Performance:     Bed Mobility:    Patient completed Rolling/Turning to Left with  independence  Patient completed Rolling/Turning to Right with independence  Patient completed Supine to Sit with independence     Activities of Daily Living:  Toileting: pt found on bed pan with BM. (No availability of drop arm BSC for SB t/f). Able to roll independently in  supine to manage bed pan. Required max A for thoroughness with posterior pericare.   LE dressing: pt donned/doffed underwear and shorts with SPV. Educated on adaptive techniques for completing in supine. Able to thread over feet long sitting and weight shift to pull over hips while laying down.     Therapeutic Positioning    OT interventions performed during the course of today's session in an effort to prevent and/or reduce acquired pressure injuries:   Education was provided on benefits of and recommendations for therapeutic positioning    Skin assessment: all bony prominences were assessed    Findings: no redness or breakdown noted    [facility] 6 Click ADL: 22    Patient Education:  Patient provided with verbal education education regarding OT role/goals/POC, post op precautions, and Discharge/DME recommendations.  Understanding was verbalized.      Patient left supine with all lines intact, call button in reach, and LLE elevated on wedge.     GOALS:   Multidisciplinary Problems       Occupational Therapy Goals          Problem: Occupational Therapy    Goal Priority Disciplines Outcome Interventions   Occupational Therapy Goal     OT, PT/OT Ongoing, Progressing    Description: Goals to be met by: 10/20/23     Patient will increase functional independence with ADLs by performing:    Pt will transfer to BSC with Mod I.  Pt will perform toileting on BSC with Mod I for hygiene and clothing management.  Pt will perform LB dressing with adaptive techniques with Mod I.  Pt will transfer to tub bench with or without AD with Mod I.                        Time Tracking:     OT Date of Treatment: 09/27/23  OT Start Time: 1402  OT Stop Time: 1425  OT Total Time (min): 23 min    Billable Minutes:Self Care/Home Management 23    OT/MIL: MIL     Number of MIL visits since last OT visit: 1    9/27/2023

## 2023-09-28 VITALS
HEART RATE: 101 BPM | RESPIRATION RATE: 18 BRPM | DIASTOLIC BLOOD PRESSURE: 83 MMHG | OXYGEN SATURATION: 97 % | SYSTOLIC BLOOD PRESSURE: 127 MMHG | WEIGHT: 206 LBS | TEMPERATURE: 98 F | HEIGHT: 64 IN | BODY MASS INDEX: 35.17 KG/M2

## 2023-09-28 PROBLEM — E44.0 MODERATE MALNUTRITION: Status: RESOLVED | Noted: 2023-09-21 | Resolved: 2023-09-28

## 2023-09-28 LAB
ALBUMIN SERPL-MCNC: 3 G/DL (ref 3.5–5)
ALBUMIN/GLOB SERPL: 1 RATIO (ref 1.1–2)
ALP SERPL-CCNC: 85 UNIT/L (ref 40–150)
ALT SERPL-CCNC: 8 UNIT/L (ref 0–55)
AST SERPL-CCNC: 26 UNIT/L (ref 5–34)
BASOPHILS # BLD AUTO: 0.03 X10(3)/MCL
BASOPHILS NFR BLD AUTO: 0.3 %
BILIRUB SERPL-MCNC: 0.5 MG/DL
BUN SERPL-MCNC: 9.3 MG/DL (ref 7–18.7)
CALCIUM SERPL-MCNC: 8.9 MG/DL (ref 8.4–10.2)
CHLORIDE SERPL-SCNC: 105 MMOL/L (ref 98–107)
CO2 SERPL-SCNC: 27 MMOL/L (ref 22–29)
CREAT SERPL-MCNC: 0.78 MG/DL (ref 0.55–1.02)
EOSINOPHIL # BLD AUTO: 0.3 X10(3)/MCL (ref 0–0.9)
EOSINOPHIL NFR BLD AUTO: 3.4 %
ERYTHROCYTE [DISTWIDTH] IN BLOOD BY AUTOMATED COUNT: 13.6 % (ref 11.5–17)
GFR SERPLBLD CREATININE-BSD FMLA CKD-EPI: >60 MLS/MIN/1.73/M2
GLOBULIN SER-MCNC: 3.1 GM/DL (ref 2.4–3.5)
GLUCOSE SERPL-MCNC: 103 MG/DL (ref 74–100)
HCT VFR BLD AUTO: 29.7 % (ref 37–47)
HGB BLD-MCNC: 9.6 G/DL (ref 12–16)
IMM GRANULOCYTES # BLD AUTO: 0.07 X10(3)/MCL (ref 0–0.04)
IMM GRANULOCYTES NFR BLD AUTO: 0.8 %
LYMPHOCYTES # BLD AUTO: 2.07 X10(3)/MCL (ref 0.6–4.6)
LYMPHOCYTES NFR BLD AUTO: 23.5 %
MCH RBC QN AUTO: 27.4 PG (ref 27–31)
MCHC RBC AUTO-ENTMCNC: 32.3 G/DL (ref 33–36)
MCV RBC AUTO: 84.9 FL (ref 80–94)
MONOCYTES # BLD AUTO: 0.89 X10(3)/MCL (ref 0.1–1.3)
MONOCYTES NFR BLD AUTO: 10.1 %
NEUTROPHILS # BLD AUTO: 5.45 X10(3)/MCL (ref 2.1–9.2)
NEUTROPHILS NFR BLD AUTO: 61.9 %
NRBC BLD AUTO-RTO: 0 %
PLATELET # BLD AUTO: 307 X10(3)/MCL (ref 130–400)
PMV BLD AUTO: 9.4 FL (ref 7.4–10.4)
POTASSIUM SERPL-SCNC: 4.3 MMOL/L (ref 3.5–5.1)
PROT SERPL-MCNC: 6.1 GM/DL (ref 6.4–8.3)
RBC # BLD AUTO: 3.5 X10(6)/MCL (ref 4.2–5.4)
SODIUM SERPL-SCNC: 140 MMOL/L (ref 136–145)
WBC # SPEC AUTO: 8.81 X10(3)/MCL (ref 4.5–11.5)

## 2023-09-28 PROCEDURE — 63600175 PHARM REV CODE 636 W HCPCS: Performed by: PHYSICIAN ASSISTANT

## 2023-09-28 PROCEDURE — 25000003 PHARM REV CODE 250

## 2023-09-28 PROCEDURE — 80053 COMPREHEN METABOLIC PANEL: CPT | Performed by: PHYSICIAN ASSISTANT

## 2023-09-28 PROCEDURE — 25000003 PHARM REV CODE 250: Performed by: PHYSICIAN ASSISTANT

## 2023-09-28 PROCEDURE — 85025 COMPLETE CBC W/AUTO DIFF WBC: CPT | Performed by: PHYSICIAN ASSISTANT

## 2023-09-28 RX ORDER — OXYCODONE HYDROCHLORIDE 10 MG/1
10 TABLET ORAL EVERY 4 HOURS PRN
Qty: 42 TABLET | Refills: 0 | Status: SHIPPED | OUTPATIENT
Start: 2023-09-28 | End: 2023-10-06 | Stop reason: SDUPTHER

## 2023-09-28 RX ORDER — GABAPENTIN 300 MG/1
300 CAPSULE ORAL 3 TIMES DAILY
Qty: 90 CAPSULE | Refills: 11 | Status: SHIPPED | OUTPATIENT
Start: 2023-09-28 | End: 2023-10-18 | Stop reason: SDUPTHER

## 2023-09-28 RX ORDER — ASPIRIN 325 MG
325 TABLET ORAL DAILY
Qty: 90 TABLET | Refills: 0 | Status: SHIPPED | OUTPATIENT
Start: 2023-09-28 | End: 2023-12-27

## 2023-09-28 RX ORDER — METHOCARBAMOL 750 MG/1
750 TABLET, FILM COATED ORAL 3 TIMES DAILY
Qty: 30 TABLET | Refills: 0 | Status: SHIPPED | OUTPATIENT
Start: 2023-09-28 | End: 2023-10-06 | Stop reason: SDUPTHER

## 2023-09-28 RX ORDER — KETOROLAC TROMETHAMINE 10 MG/1
10 TABLET, FILM COATED ORAL EVERY 6 HOURS PRN
Qty: 20 TABLET | Refills: 0 | Status: SHIPPED | OUTPATIENT
Start: 2023-09-28 | End: 2023-10-03

## 2023-09-28 RX ADMIN — CEFAZOLIN SODIUM 2 G: 2 SOLUTION INTRAVENOUS at 12:09

## 2023-09-28 RX ADMIN — FAMOTIDINE 20 MG: 20 TABLET, FILM COATED ORAL at 09:09

## 2023-09-28 RX ADMIN — METHOCARBAMOL 750 MG: 750 TABLET ORAL at 09:09

## 2023-09-28 RX ADMIN — OXYCODONE HYDROCHLORIDE 10 MG: 10 TABLET ORAL at 02:09

## 2023-09-28 RX ADMIN — GABAPENTIN 300 MG: 300 CAPSULE ORAL at 09:09

## 2023-09-28 RX ADMIN — OXYCODONE HYDROCHLORIDE 10 MG: 10 TABLET ORAL at 06:09

## 2023-09-28 RX ADMIN — OXYCODONE HYDROCHLORIDE 10 MG: 10 TABLET ORAL at 09:09

## 2023-09-28 RX ADMIN — DOCUSATE SODIUM 200 MG: 100 CAPSULE, LIQUID FILLED ORAL at 09:09

## 2023-09-28 RX ADMIN — CEFAZOLIN SODIUM 2 G: 2 SOLUTION INTRAVENOUS at 09:09

## 2023-09-28 NOTE — PLAN OF CARE
09/28/23 0851   Final Note   Assessment Type Final Discharge Note   Anticipated Discharge Disposition Home-Health   Hospital Resources/Appts/Education Provided Post-Acute resouces added to AVS   Post-Acute Status   Post-Acute Authorization Home Health   Home Health Status Set-up Complete/Auth obtained   Discharge Delays None known at this time     Patient will dc home with home health services. [facility] has accepted patient. Patient is self pay and has purchased all needed DME. Bariatric BSC with drop arm will be delivered on 9/29. Family will provide transportation home.

## 2023-09-28 NOTE — PLAN OF CARE
DC orders and AVS sent to [facility] via [facility].

## 2023-09-28 NOTE — NURSING
Patient to be discharged home with [facility]. Both patient and spouse present for discharge teaching. Both stated they understand weight baring restrictions as well as daily wound care. Follow up appointments have been made and patient stated she understands the importance of attending and when to call MD with any questions or concerns. Iv was removed without complications. [facility]. Patient stable upon discharge.

## 2023-09-29 PROCEDURE — G0180 PR HOME HEALTH MD CERTIFICATION: ICD-10-PCS | Mod: ,,, | Performed by: PHYSICIAN ASSISTANT

## 2023-09-29 PROCEDURE — G0180 MD CERTIFICATION HHA PATIENT: HCPCS | Mod: ,,, | Performed by: PHYSICIAN ASSISTANT

## 2023-10-06 RX ORDER — METHOCARBAMOL 750 MG/1
750 TABLET, FILM COATED ORAL 3 TIMES DAILY
Qty: 30 TABLET | Refills: 0 | Status: SHIPPED | OUTPATIENT
Start: 2023-10-06 | End: 2023-10-16

## 2023-10-06 RX ORDER — OXYCODONE HYDROCHLORIDE 10 MG/1
10 TABLET ORAL EVERY 6 HOURS PRN
Qty: 28 TABLET | Refills: 0 | Status: SHIPPED | OUTPATIENT
Start: 2023-10-06 | End: 2023-10-18 | Stop reason: SDUPTHER

## 2023-10-12 NOTE — ADDENDUM NOTE
Addendum  created 10/12/23 0752 by Tiffani Jasso MD    Clinical Note Signed, Diagnosis association updated, Intraprocedure Blocks edited, SmartForm saved

## 2023-10-18 ENCOUNTER — OFFICE VISIT (OUTPATIENT)
Dept: ORTHOPEDICS | Facility: CLINIC | Age: 48
End: 2023-10-18
Payer: MEDICAID

## 2023-10-18 ENCOUNTER — HOSPITAL ENCOUNTER (OUTPATIENT)
Dept: RADIOLOGY | Facility: CLINIC | Age: 48
Discharge: HOME | End: 2023-10-18
Attending: ORTHOPAEDIC SURGERY
Payer: MEDICAID

## 2023-10-18 VITALS
WEIGHT: 205.94 LBS | SYSTOLIC BLOOD PRESSURE: 115 MMHG | HEIGHT: 64 IN | HEART RATE: 74 BPM | DIASTOLIC BLOOD PRESSURE: 78 MMHG | RESPIRATION RATE: 18 BRPM | BODY MASS INDEX: 35.16 KG/M2

## 2023-10-18 DIAGNOSIS — S82.871D CLOSED DISPLACED PILON FRACTURE OF RIGHT TIBIA WITH ROUTINE HEALING, SUBSEQUENT ENCOUNTER: ICD-10-CM

## 2023-10-18 DIAGNOSIS — S82.872D CLOSED DISPLACED PILON FRACTURE OF LEFT TIBIA WITH ROUTINE HEALING, SUBSEQUENT ENCOUNTER: Primary | ICD-10-CM

## 2023-10-18 DIAGNOSIS — S82.872D CLOSED DISPLACED PILON FRACTURE OF LEFT TIBIA WITH ROUTINE HEALING, SUBSEQUENT ENCOUNTER: ICD-10-CM

## 2023-10-18 PROCEDURE — 99024 PR POST-OP FOLLOW-UP VISIT: ICD-10-PCS | Mod: ,,, | Performed by: ORTHOPAEDIC SURGERY

## 2023-10-18 PROCEDURE — 73610 X-RAY EXAM OF ANKLE: CPT | Mod: LT,,, | Performed by: ORTHOPAEDIC SURGERY

## 2023-10-18 PROCEDURE — 73610 X-RAY EXAM OF ANKLE: CPT | Mod: RT,,, | Performed by: ORTHOPAEDIC SURGERY

## 2023-10-18 PROCEDURE — 73610 XR ANKLE COMPLETE 3 VIEW LEFT: ICD-10-PCS | Mod: LT,,, | Performed by: ORTHOPAEDIC SURGERY

## 2023-10-18 PROCEDURE — 99024 POSTOP FOLLOW-UP VISIT: CPT | Mod: ,,, | Performed by: ORTHOPAEDIC SURGERY

## 2023-10-18 RX ORDER — OXYCODONE HYDROCHLORIDE 10 MG/1
10 TABLET ORAL EVERY 6 HOURS PRN
Qty: 28 TABLET | Refills: 0 | Status: SHIPPED | OUTPATIENT
Start: 2023-10-18 | End: 2023-10-30 | Stop reason: SDUPTHER

## 2023-10-18 RX ORDER — METHOCARBAMOL 750 MG/1
750 TABLET, FILM COATED ORAL 3 TIMES DAILY
Qty: 30 TABLET | Refills: 0 | Status: SHIPPED | OUTPATIENT
Start: 2023-10-18 | End: 2023-10-30 | Stop reason: SDUPTHER

## 2023-10-18 RX ORDER — GABAPENTIN 300 MG/1
300 CAPSULE ORAL 3 TIMES DAILY
Qty: 90 CAPSULE | Refills: 1 | Status: SHIPPED | OUTPATIENT
Start: 2023-10-18 | End: 2024-01-10 | Stop reason: SDUPTHER

## 2023-10-18 NOTE — PROGRESS NOTES
"  Subjective:       Patient ID: Janeen Arrieta is a 47 y.o. female.  Chief Complaint   Patient presents with    Left Ankle - Post-op Evaluation     3.5 week f/u left ankle fusion, orif right pilon fx, nwb bilat lower extremities.          HPI    Patient presents for 3.5 week follow up left ankle fusion, approx 4.5 week follow up ORIF right pilon ankle fracture. She remains non weight bearing to bilateral lower extremities. In wheelchair today. Needs order for bedside commode- received HH notification for this today as well. Having some shocking pain in the left calf. Plantar surface of the left foot remains numb/tingle. Doing well otherwise. Has been doing full range of motion of the right ankle. No HH PT until weight bearing as they are not able to help her do much and she is self pay at this time.     ROS:  Constitutional: Denies fever chills  Eyes: No change in vision  ENT: No ringing or current infections  CV: No chest pain  Resp: No labored breathing  MSK: Pain evident at site of injury located in HPI,   Integ: No signs of abrasions or lacerations  Neuro: No numbness or tingling  Lymphatic: No swelling outside the area of injury     Current Outpatient Medications on File Prior to Visit   Medication Sig Dispense Refill    aspirin 325 MG tablet Take 1 tablet (325 mg total) by mouth once daily. 90 tablet 0    [DISCONTINUED] gabapentin (NEURONTIN) 300 MG capsule Take 1 capsule (300 mg total) by mouth 3 (three) times daily. 90 capsule 11    [DISCONTINUED] oxyCODONE (ROXICODONE) 10 mg Tab immediate release tablet Take 1 tablet (10 mg total) by mouth every 6 (six) hours as needed for Pain. 28 tablet 0     No current facility-administered medications on file prior to visit.          Objective:      /78   Pulse 74   Resp 18   Ht 5' 4" (1.626 m)   Wt 93.4 kg (205 lb 14.6 oz)   BMI 35.34 kg/m²   Physical Exam  General the patient is alert and oriented x3 no acute distress nontoxic-appearing appropriate " "affect.    Constitutional: Vital signs are examined and stable.  Resp: No signs of labored breathing    Musculoskeletal:     Right lower extremity: incisions healing well without erythema, drainage, signs of dehiscence, stable eschar; compartments are soft and compressible; Tolerates full passive ROM of the right ankle; appropriate tenderness to palpation; dorsi/plantar flexes the foot; SILT distally; BCR distally; DP pulse palpable  Left lower extremity: incisions healing well without erythema, drainage, signs of dehiscence; compartments are soft and compressible; no ankle ROM attempted due to fusion; appropriate tenderness to palpation; SILT distally; BCR distally; DP pulse palpable      Body mass index is 35.34 kg/m².  Ideal body weight: 54.7 kg (120 lb 9.5 oz)  Adjusted ideal body weight: 70.2 kg (154 lb 11.5 oz)  No results found for: "HGBA1C"  Hgb   Date Value Ref Range Status   09/28/2023 9.6 (L) 12.0 - 16.0 g/dL Final   09/27/2023 10.5 (L) 12.0 - 16.0 g/dL Final     Hct   Date Value Ref Range Status   09/28/2023 29.7 (L) 37.0 - 47.0 % Final   09/27/2023 32.4 (L) 37.0 - 47.0 % Final     No results found for: "IRON"  No components found for: "FROLATE"  Vit D 25 OH   Date Value Ref Range Status   06/27/2017 23.31 (L) 30.00 - 80.00 ng/mL Final     WBC   Date Value Ref Range Status   09/28/2023 8.81 4.50 - 11.50 x10(3)/mcL Final   09/27/2023 9.35 4.50 - 11.50 x10(3)/mcL Final       Radiology: 3 view x ray right ankle: hardware intact without signs of loosening or failure. No consolidation as compared to previous images as expected in the acute post operative period. Alignment is well maintained.     3 view x ray left ankle: TTC nail in place. Alignment well maintained as compared to post operative images. Hardware intact without loosening or failure.        Assessment:         1. Closed displaced pilon fracture of left tibia with routine healing, subsequent encounter  X-Ray Ankle Complete Left    COMMODE FOR HOME " USE      2. Closed displaced pilon fracture of right tibia with routine healing, subsequent encounter  X-Ray Ankle Complete Right    COMMODE FOR HOME USE              Plan:         Follow up in about 6 weeks (around 11/29/2023), or if symptoms worsen or fail to improve.    Janeen was seen today for post-op evaluation.    Diagnoses and all orders for this visit:    Closed displaced pilon fracture of left tibia with routine healing, subsequent encounter  -     X-Ray Ankle Complete Left; Future  -     COMMODE FOR HOME USE    Closed displaced pilon fracture of right tibia with routine healing, subsequent encounter  -     X-Ray Ankle Complete Right; Future  -     COMMODE FOR HOME USE        -Bedside commode. Refilled oxycodone, robaxin, and gabapentin today.   -Daily dressing changes as needed, may leave open to air.   - Remain NWB to the bilateral lower extremities. Will likely progress WB to the RLE at 10 weeks which will be her next follow up. The left ankle will follow 2-3 weeks following that.  Continue aspirin for DVT ppx while she remains non weight bearing.   -follow up in 6-8 weeks for repeat x rays and evaluation.   -ED precautions given  - Patient with bilateral lower extremity injuries. She is able to self propel with wheelchair. She is unable to stand on her bilateral legs. She will need bilateral elevating leg rests due to her injuries. She has sufficient upper body strength to self propel and also has full time help at home for wheelchair use. She has a ramp and will benefit from wheelchair due to her injuries.    The above findings, diagnostics, and treatment plan were discussed with Dr. Humphrey who is in agreement with the plan of care except as stated in additional documentation.       Marli Rossi PA-C          Future Appointments   Date Time Provider Department Center   11/29/2023  8:15 AM Ryan Humphrey DO [facility]

## 2023-10-20 NOTE — ADDENDUM NOTE
Addendum  created 10/20/23 0858 by Mj Mandujano MD    Clinical Note Signed, Diagnosis association updated, Intraprocedure Blocks edited, SmartForm saved

## 2023-10-23 DIAGNOSIS — S82.872D CLOSED DISPLACED PILON FRACTURE OF LEFT TIBIA WITH ROUTINE HEALING, SUBSEQUENT ENCOUNTER: Primary | ICD-10-CM

## 2023-10-23 DIAGNOSIS — S82.871D CLOSED DISPLACED PILON FRACTURE OF RIGHT TIBIA WITH ROUTINE HEALING, SUBSEQUENT ENCOUNTER: ICD-10-CM

## 2023-10-30 DIAGNOSIS — S82.872D CLOSED DISPLACED PILON FRACTURE OF LEFT TIBIA WITH ROUTINE HEALING, SUBSEQUENT ENCOUNTER: Primary | ICD-10-CM

## 2023-10-30 DIAGNOSIS — S82.872D CLOSED DISPLACED PILON FRACTURE OF LEFT TIBIA WITH ROUTINE HEALING, SUBSEQUENT ENCOUNTER: ICD-10-CM

## 2023-10-30 DIAGNOSIS — S82.871D CLOSED DISPLACED PILON FRACTURE OF RIGHT TIBIA WITH ROUTINE HEALING, SUBSEQUENT ENCOUNTER: ICD-10-CM

## 2023-10-30 RX ORDER — OXYCODONE HYDROCHLORIDE 10 MG/1
10 TABLET ORAL EVERY 6 HOURS PRN
Qty: 28 TABLET | Refills: 0 | Status: SHIPPED | OUTPATIENT
Start: 2023-10-30 | End: 2023-11-16 | Stop reason: SDUPTHER

## 2023-10-30 RX ORDER — METHOCARBAMOL 750 MG/1
750 TABLET, FILM COATED ORAL 3 TIMES DAILY
Qty: 30 TABLET | Refills: 0 | Status: SHIPPED | OUTPATIENT
Start: 2023-10-30 | End: 2023-11-09

## 2023-11-06 ENCOUNTER — TELEPHONE (OUTPATIENT)
Dept: ORTHOPEDICS | Facility: CLINIC | Age: 48
End: 2023-11-06
Payer: MEDICAID

## 2023-11-06 NOTE — TELEPHONE ENCOUNTER
Spoke to patient, informed her that office visit note was updated by provider and faxed to [facility]. Patient voiced a clear understanding. She will call our office with nay further questions or concerns.

## 2023-11-07 ENCOUNTER — EXTERNAL HOME HEALTH (OUTPATIENT)
Dept: HOME HEALTH SERVICES | Facility: HOSPITAL | Age: 48
End: 2023-11-07
Payer: MEDICAID

## 2023-11-07 NOTE — PROGRESS NOTES
Hospital bed for home use:  Patient with bilateral lower extremity injuries. Hospital bed has been ordered as she did not have coverage for rehab upon discharge and is in need of medical supplies at home. Her bed is very high and she is unable to get onto the bed due to her bilateral lower extremity injuries. She remains with non weight bearing restrictions at this time. She will have assistance from home with mobility form wheelchair to bed as necessary. She will benefit to form this while she remains non weight bearing at least another 8-10 weeks.    Alexandra Blair Lemaire, PA-C [facility]   Orthopedic Trauma

## 2023-11-08 DIAGNOSIS — S82.871D CLOSED DISPLACED PILON FRACTURE OF RIGHT TIBIA WITH ROUTINE HEALING, SUBSEQUENT ENCOUNTER: ICD-10-CM

## 2023-11-08 DIAGNOSIS — S82.872D CLOSED DISPLACED PILON FRACTURE OF LEFT TIBIA WITH ROUTINE HEALING, SUBSEQUENT ENCOUNTER: Primary | ICD-10-CM

## 2023-11-08 NOTE — TELEPHONE ENCOUNTER
Received VM from Marta with [facility] [employer] [place] requesting a call back. Called Marta back she states that the orders have to call be signed by the same provider for insurance purposes. Message provider to update orders and signature. New orders faxed to [facility].

## 2023-11-10 ENCOUNTER — DOCUMENT SCAN (OUTPATIENT)
Dept: HOME HEALTH SERVICES | Facility: HOSPITAL | Age: 48
End: 2023-11-10
Payer: MEDICAID

## 2023-11-16 DIAGNOSIS — S82.872D CLOSED DISPLACED PILON FRACTURE OF LEFT TIBIA WITH ROUTINE HEALING, SUBSEQUENT ENCOUNTER: ICD-10-CM

## 2023-11-16 DIAGNOSIS — S82.871D CLOSED DISPLACED PILON FRACTURE OF RIGHT TIBIA WITH ROUTINE HEALING, SUBSEQUENT ENCOUNTER: ICD-10-CM

## 2023-11-16 NOTE — TELEPHONE ENCOUNTER
Patient called  for a refill. Routed request to provider. Explained request was routed to provider and they can check with their pharmacy within the next few hours. Patient voiced a clear understanding.      Patient will also contact home health physical therapy to schedule dates to began working with ROM.

## 2023-11-17 RX ORDER — OXYCODONE HYDROCHLORIDE 10 MG/1
10 TABLET ORAL EVERY 8 HOURS PRN
Qty: 21 TABLET | Refills: 0 | Status: SHIPPED | OUTPATIENT
Start: 2023-11-17 | End: 2023-12-14 | Stop reason: SDUPTHER

## 2023-11-22 ENCOUNTER — DOCUMENT SCAN (OUTPATIENT)
Dept: HOME HEALTH SERVICES | Facility: HOSPITAL | Age: 48
End: 2023-11-22
Payer: MEDICAID

## 2023-11-28 PROCEDURE — G0179 MD RECERTIFICATION HHA PT: HCPCS | Mod: ,,, | Performed by: ORTHOPAEDIC SURGERY

## 2023-11-29 ENCOUNTER — HOSPITAL ENCOUNTER (OUTPATIENT)
Dept: RADIOLOGY | Facility: CLINIC | Age: 48
Discharge: HOME | End: 2023-11-29
Attending: ORTHOPAEDIC SURGERY
Payer: MEDICAID

## 2023-11-29 ENCOUNTER — OFFICE VISIT (OUTPATIENT)
Dept: ORTHOPEDICS | Facility: CLINIC | Age: 48
End: 2023-11-29
Payer: MEDICAID

## 2023-11-29 ENCOUNTER — LAB VISIT (OUTPATIENT)
Dept: LAB | Facility: HOSPITAL | Age: 48
End: 2023-11-29
Attending: ORTHOPAEDIC SURGERY
Payer: MEDICAID

## 2023-11-29 VITALS
HEIGHT: 64 IN | WEIGHT: 205 LBS | HEART RATE: 77 BPM | BODY MASS INDEX: 35 KG/M2 | SYSTOLIC BLOOD PRESSURE: 133 MMHG | DIASTOLIC BLOOD PRESSURE: 94 MMHG

## 2023-11-29 DIAGNOSIS — S82.871D CLOSED DISPLACED PILON FRACTURE OF RIGHT TIBIA WITH ROUTINE HEALING, SUBSEQUENT ENCOUNTER: Primary | ICD-10-CM

## 2023-11-29 DIAGNOSIS — S82.872D CLOSED DISPLACED PILON FRACTURE OF LEFT TIBIA WITH ROUTINE HEALING, SUBSEQUENT ENCOUNTER: ICD-10-CM

## 2023-11-29 DIAGNOSIS — S82.871D CLOSED DISPLACED PILON FRACTURE OF RIGHT TIBIA WITH ROUTINE HEALING, SUBSEQUENT ENCOUNTER: ICD-10-CM

## 2023-11-29 DIAGNOSIS — M80.00XD AGE-RELATED OSTEOPOROSIS WITH CURRENT PATHOLOGICAL FRACTURE WITH ROUTINE HEALING, SUBSEQUENT ENCOUNTER: ICD-10-CM

## 2023-11-29 LAB — DEPRECATED CALCIDIOL+CALCIFEROL SERPL-MC: 29.3 NG/ML (ref 30–80)

## 2023-11-29 PROCEDURE — 3075F SYST BP GE 130 - 139MM HG: CPT | Mod: CPTII,,, | Performed by: ORTHOPAEDIC SURGERY

## 2023-11-29 PROCEDURE — 73610 X-RAY EXAM OF ANKLE: CPT | Mod: RT,,, | Performed by: ORTHOPAEDIC SURGERY

## 2023-11-29 PROCEDURE — 99024 PR POST-OP FOLLOW-UP VISIT: ICD-10-PCS | Mod: ,,, | Performed by: ORTHOPAEDIC SURGERY

## 2023-11-29 PROCEDURE — 3080F PR MOST RECENT DIASTOLIC BLOOD PRESSURE >= 90 MM HG: ICD-10-PCS | Mod: CPTII,,, | Performed by: ORTHOPAEDIC SURGERY

## 2023-11-29 PROCEDURE — 36415 COLL VENOUS BLD VENIPUNCTURE: CPT

## 2023-11-29 PROCEDURE — 73610 X-RAY EXAM OF ANKLE: CPT | Mod: LT,,, | Performed by: ORTHOPAEDIC SURGERY

## 2023-11-29 PROCEDURE — 3075F PR MOST RECENT SYSTOLIC BLOOD PRESS GE 130-139MM HG: ICD-10-PCS | Mod: CPTII,,, | Performed by: ORTHOPAEDIC SURGERY

## 2023-11-29 PROCEDURE — 1159F PR MEDICATION LIST DOCUMENTED IN MEDICAL RECORD: ICD-10-PCS | Mod: CPTII,,, | Performed by: ORTHOPAEDIC SURGERY

## 2023-11-29 PROCEDURE — 99024 POSTOP FOLLOW-UP VISIT: CPT | Mod: ,,, | Performed by: ORTHOPAEDIC SURGERY

## 2023-11-29 PROCEDURE — 82306 VITAMIN D 25 HYDROXY: CPT

## 2023-11-29 PROCEDURE — 73610 XR ANKLE COMPLETE 3 VIEW RIGHT: ICD-10-PCS | Mod: RT,,, | Performed by: ORTHOPAEDIC SURGERY

## 2023-11-29 PROCEDURE — 3080F DIAST BP >= 90 MM HG: CPT | Mod: CPTII,,, | Performed by: ORTHOPAEDIC SURGERY

## 2023-11-29 PROCEDURE — 1159F MED LIST DOCD IN RCRD: CPT | Mod: CPTII,,, | Performed by: ORTHOPAEDIC SURGERY

## 2023-11-29 RX ORDER — METHOCARBAMOL 500 MG/1
500 TABLET, FILM COATED ORAL 4 TIMES DAILY
COMMUNITY

## 2023-11-29 RX ORDER — TIZANIDINE 2 MG/1
4 TABLET ORAL EVERY 8 HOURS PRN
Qty: 30 TABLET | Refills: 0 | Status: SHIPPED | OUTPATIENT
Start: 2023-11-29 | End: 2023-12-14 | Stop reason: SDUPTHER

## 2023-11-29 RX ORDER — OXYCODONE HYDROCHLORIDE 5 MG/1
10 TABLET ORAL EVERY 4 HOURS PRN
COMMUNITY
End: 2024-01-31 | Stop reason: SDUPTHER

## 2023-11-29 NOTE — PROGRESS NOTES
"  Subjective:       Patient ID: Janeen Arrieta is a 47 y.o. female.  Chief Complaint   Patient presents with    Left Ankle - Follow-up     10 week f/u from left ankle fusion. ORIF right pilon fx. States she is not wear cam boots. Stated ROM to RLE with home health. Reports nerve pain to LLE. Andrea NWB.         HPI    Patient presents for 10 week follow up left ankle fusion, approx 8 week follow up ORIF right pilon ankle fracture. She remains non weight bearing to bilateral lower extremities. In wheelchair today.  She is getting an order for a rolling walker and was sent proximally 10 days ago.  Patient is doing well.  She is having some inconsistent tingling and sensation changes dorsum of her foot some sensitivity in the lateral view foot.  Overall though she is been compliant.  She said recently she has been using her right leg to get around.  This is in a wheelchair.  No new numbness or tingling.  Here with her .    ROS:  Constitutional: Denies fever chills  Eyes: No change in vision  ENT: No ringing or current infections  CV: No chest pain  Resp: No labored breathing  MSK: Pain evident at site of injury located in HPI,   Integ: No signs of abrasions or lacerations  Neuro: No numbness or tingling  Lymphatic: No swelling outside the area of injury     Current Outpatient Medications on File Prior to Visit   Medication Sig Dispense Refill    aspirin 325 MG tablet Take 1 tablet (325 mg total) by mouth once daily. 90 tablet 0    gabapentin (NEURONTIN) 300 MG capsule Take 1 capsule (300 mg total) by mouth 3 (three) times daily. 90 capsule 1    methocarbamoL (ROBAXIN) 500 MG Tab Take 500 mg by mouth 4 (four) times daily.      oxyCODONE (ROXICODONE) 5 MG immediate release tablet Take 10 mg by mouth every 4 (four) hours as needed for Pain.       No current facility-administered medications on file prior to visit.          Objective:      BP (!) 133/94   Pulse 77   Ht 5' 4" (1.626 m)   Wt 93 kg (205 lb)   BMI " "35.19 kg/m²   Physical Exam  General the patient is alert and oriented x3 no acute distress nontoxic-appearing appropriate affect.    Constitutional: Vital signs are examined and stable.  Resp: No signs of labored breathing    Musculoskeletal:     Right lower extremity: no signs erythema, drainage, signs of dehiscence, stable eschar; compartments are soft and compressible; Tolerates full passive ROM of the right ankle; appropriate tenderness to palpation; dorsi/plantar flexes the foot; SILT distally; BCR distally; DP pulse palpable  Left lower extremity: i no signs of erythema, drainage, signs of dehiscence; compartments are soft and compressible; no ankle ROM attempted due to fusion; minimal tenderness palpation of the base of the 5th metatarsal SILT distally; BCR distally; DP pulse palpable      Body mass index is 35.19 kg/m².  Ideal body weight: 54.7 kg (120 lb 9.5 oz)  Adjusted ideal body weight: 70 kg (154 lb 5.7 oz)  No results found for: "HGBA1C"  Hgb   Date Value Ref Range Status   09/28/2023 9.6 (L) 12.0 - 16.0 g/dL Final   09/27/2023 10.5 (L) 12.0 - 16.0 g/dL Final     Hct   Date Value Ref Range Status   09/28/2023 29.7 (L) 37.0 - 47.0 % Final   09/27/2023 32.4 (L) 37.0 - 47.0 % Final     No results found for: "IRON"  No components found for: "FROLATE"  Vit D 25 [place]   Date Value Ref Range Status   06/27/2017 23.31 (L) 30.00 - 80.00 ng/mL Final     WBC   Date Value Ref Range Status   09/28/2023 8.81 4.50 - 11.50 x10(3)/mcL Final   09/27/2023 9.35 4.50 - 11.50 x10(3)/mcL Final       Radiology: 3 view x ray right ankle: hardware intact without signs of loosening or failure.  Healing evident.  Concerned for persistent fracture line of the anterior Pilon    3 view x ray left ankle: TTC nail in place. Alignment well maintained as compared to post operative images. Hardware intact without loosening or failure.        Assessment:         1. Closed displaced pilon fracture of right tibia with routine healing, " subsequent encounter  X-Ray Ankle Complete Right    CT Ankle (Including Hindfoot) Without Contrast Right    Ambulatory referral/consult to Physical/Occupational Therapy    CANCELED: Ambulatory referral/consult to Physical/Occupational Therapy      2. Closed displaced pilon fracture of left tibia with routine healing, subsequent encounter  X-Ray Ankle Complete Left      3. Age-related osteoporosis with current pathological fracture with routine healing, subsequent encounter  Vitamin D              Plan:         No follow-ups on file.    Janeen was seen today for follow-up.    Diagnoses and all orders for this visit:    Closed displaced pilon fracture of right tibia with routine healing, subsequent encounter  -     X-Ray Ankle Complete Right; Future  -     CT Ankle (Including Hindfoot) Without Contrast Right; Future  -     Cancel: Ambulatory referral/consult to Physical/Occupational Therapy; Future  -     Ambulatory referral/consult to Physical/Occupational Therapy; Future    Closed displaced pilon fracture of left tibia with routine healing, subsequent encounter  -     X-Ray Ankle Complete Left; Future    Age-related osteoporosis with current pathological fracture with routine healing, subsequent encounter  -     Vitamin D; Future        -we will switch patient is has tizanidine.  We will also get a new vitamin-D.  Patient states she did not complete vitamin-D prescription due to a hurting her bones.  Patient has possible persistent fracture line of the anterior aspect of the right distal tibia.  We will send her for CT scan to evaluate this.  The left ankle appears to be healing normally.  We will continue to evaluate this over the coming months.  Follow up in January that time we will likely be weight-bearing as tolerated in the right lower extremity in a tennis shoe.  Left lower extremity we will be transitioning out of the boot at that time.  Patient has devastating injuries to bilateral ankles.  We have  discussed this at every visit.  We will make some changes to her muscle relaxers.  She is only taking gabapentin at night at her preference.  We will likely place her on vitamin-D again to try to increase her chances of union.        Future Appointments   Date Time Provider Department Center   1/10/2024  9:00 AM Ryan Humphrey DO [facility] [place]

## 2023-11-30 DIAGNOSIS — E55.9 VITAMIN D DEFICIENCY: Primary | ICD-10-CM

## 2023-11-30 RX ORDER — ERGOCALCIFEROL 1.25 MG/1
50000 CAPSULE ORAL
Qty: 4 CAPSULE | Refills: 0 | Status: SHIPPED | OUTPATIENT
Start: 2023-11-30 | End: 2023-12-30

## 2023-12-07 ENCOUNTER — HOSPITAL ENCOUNTER (OUTPATIENT)
Dept: RADIOLOGY | Facility: HOSPITAL | Age: 48
Discharge: HOME | End: 2023-12-07
Attending: PHYSICIAN ASSISTANT
Payer: MEDICAID

## 2023-12-07 DIAGNOSIS — S82.871D CLOSED DISPLACED PILON FRACTURE OF RIGHT TIBIA WITH ROUTINE HEALING, SUBSEQUENT ENCOUNTER: ICD-10-CM

## 2023-12-07 PROCEDURE — 73700 CT LOWER EXTREMITY W/O DYE: CPT | Mod: TC,RT

## 2023-12-12 NOTE — PROGRESS NOTES
Patient's CT of her right Pilon fracture.  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  This is concerning as it has a 50% anterior part of her joint.  There is essentially no healing.  Concern for persistent nonunion concern for loss of anterior of the articular surface.  She does have a fusion on the contralateral side with a hindfoot fusion nail this was a Pilon fracture which was significantly comminuted greater than 5 fragments.   We elected to fix the right ankle to try to retain motion.  At this time it is looking that she will also need a fusion.  Plan for weight-bearing as tolerated a [place] time of the left.  Refrain from weight-bearing in the right ankle until follow up in January.  At that time we will discuss fusion versus weightbearing as tolerated on the nonunion.    I explained that surgery and the nature of their condition are not without risks. These include, but are not limited to, bleeding, infection, neurovascular compromise, malunion, nonunion, hardware complications, wound complications, scarring, cosmetic defects, need for later and/or repeated surgeries, avascular necrosis, bone death due to initial trauma, pain, loss of ROM, loss of function, PTOA, deformity, stance/gait and/or functional abnormalities, thromboembolic complications, compartment syndrome, loss of limb, loss of life, anesthetic complications, and other imponderables. I explained that these can occur despite the adequacy of treatments rendered, and that their risks are heightened given the nature of their condition. They verbalized understanding. They would like to continue with surgery at this time. If appropriate family was involved with surgical discussion.     This note/OR report was created with the assistance of  voice recognition software or phone  dictation.  There may be transcription errors as a result of using this technology however minimal. Effort has been made to assure accuracy  of transcription but any obvious errors or omissions should be clarified with the author of the document.       Ryan Humphrey, DO  Orthopedic Trauma Surgery

## 2023-12-14 DIAGNOSIS — S82.872D CLOSED DISPLACED PILON FRACTURE OF LEFT TIBIA WITH ROUTINE HEALING, SUBSEQUENT ENCOUNTER: ICD-10-CM

## 2023-12-14 DIAGNOSIS — S82.871D CLOSED DISPLACED PILON FRACTURE OF RIGHT TIBIA WITH ROUTINE HEALING, SUBSEQUENT ENCOUNTER: ICD-10-CM

## 2023-12-14 RX ORDER — OXYCODONE HYDROCHLORIDE 10 MG/1
10 TABLET ORAL EVERY 8 HOURS PRN
Qty: 21 TABLET | Refills: 0 | Status: SHIPPED | OUTPATIENT
Start: 2023-12-14 | End: 2024-01-10 | Stop reason: SDUPTHER

## 2023-12-14 RX ORDER — TIZANIDINE 2 MG/1
4 TABLET ORAL EVERY 8 HOURS PRN
Qty: 30 TABLET | Refills: 0 | Status: SHIPPED | OUTPATIENT
Start: 2023-12-14 | End: 2024-01-10 | Stop reason: SDUPTHER

## 2023-12-19 ENCOUNTER — TELEPHONE (OUTPATIENT)
Dept: ORTHOPEDICS | Facility: HOSPITAL | Age: 48
End: 2023-12-19
Payer: MEDICAID

## 2023-12-19 NOTE — PROGRESS NOTES
Spoke to Janeen over the phone with questions regarding weight bearing to the LLE and progression next week as she previously discussed with Dr. Humphrey. She does also have some questions regarding trial of weight bearing versus fusion of the right ankle. I discussed this with her to the best of my ability today. Reviewed CT scan. Does appear to have some cartilage and fragment osteolysis along the anterior joint line when looking at the sagittal view. I did discuss this with her to the best of my ability without having her present to look at images. Appears that she has a nonunion of at least the anterior fracture fragment, possible other fragments as well. She is concerned about her future mobility if she has bilateral ankle fusions. This is understandable as she is very active in taking care of her family and does not live a sedentary lifestyle. She is continuing to do range of motion exercises on the right ankle and remains NWB. She will begin WB to the LLE next week in her boot with use of walker or knee scooter, whichever she is more comfortable with. We will go over her imaging with her on 1/10 in office and discuss options such as trial of weight bearing vs formal fusion of the right ankle and any other surgical questions she may have for Dr. Humphrey so that we can make informed decisions together. She will call to let me know if she needs any updated therapy orders in the meantime.     Alexandra Blair Lemaire, PA-C [facility]   Orthopedic Trauma Surgery

## 2024-01-03 ENCOUNTER — EXTERNAL HOME HEALTH (OUTPATIENT)
Dept: HOME HEALTH SERVICES | Facility: HOSPITAL | Age: 49
End: 2024-01-03
Payer: MEDICAID

## 2024-01-05 ENCOUNTER — DOCUMENT SCAN (OUTPATIENT)
Dept: HOME HEALTH SERVICES | Facility: HOSPITAL | Age: 49
End: 2024-01-05
Payer: MEDICAID

## 2024-01-10 ENCOUNTER — HOSPITAL ENCOUNTER (OUTPATIENT)
Dept: RADIOLOGY | Facility: CLINIC | Age: 49
Discharge: HOME | End: 2024-01-10
Attending: ORTHOPAEDIC SURGERY
Payer: MEDICAID

## 2024-01-10 ENCOUNTER — OFFICE VISIT (OUTPATIENT)
Dept: ORTHOPEDICS | Facility: CLINIC | Age: 49
End: 2024-01-10
Payer: MEDICAID

## 2024-01-10 VITALS
HEIGHT: 64 IN | WEIGHT: 205 LBS | SYSTOLIC BLOOD PRESSURE: 131 MMHG | BODY MASS INDEX: 35 KG/M2 | HEART RATE: 63 BPM | RESPIRATION RATE: 18 BRPM | DIASTOLIC BLOOD PRESSURE: 79 MMHG

## 2024-01-10 DIAGNOSIS — S82.871D CLOSED DISPLACED PILON FRACTURE OF RIGHT TIBIA WITH ROUTINE HEALING, SUBSEQUENT ENCOUNTER: ICD-10-CM

## 2024-01-10 DIAGNOSIS — S82.872D CLOSED DISPLACED PILON FRACTURE OF LEFT TIBIA WITH ROUTINE HEALING, SUBSEQUENT ENCOUNTER: ICD-10-CM

## 2024-01-10 DIAGNOSIS — S82.872D CLOSED DISPLACED PILON FRACTURE OF LEFT TIBIA WITH ROUTINE HEALING, SUBSEQUENT ENCOUNTER: Primary | ICD-10-CM

## 2024-01-10 PROCEDURE — 3078F DIAST BP <80 MM HG: CPT | Mod: CPTII,,, | Performed by: ORTHOPAEDIC SURGERY

## 2024-01-10 PROCEDURE — 73610 X-RAY EXAM OF ANKLE: CPT | Mod: RT,,, | Performed by: ORTHOPAEDIC SURGERY

## 2024-01-10 PROCEDURE — 73610 X-RAY EXAM OF ANKLE: CPT | Mod: LT,,, | Performed by: ORTHOPAEDIC SURGERY

## 2024-01-10 PROCEDURE — 99214 OFFICE O/P EST MOD 30 MIN: CPT | Mod: ,,, | Performed by: ORTHOPAEDIC SURGERY

## 2024-01-10 PROCEDURE — 1159F MED LIST DOCD IN RCRD: CPT | Mod: CPTII,,, | Performed by: ORTHOPAEDIC SURGERY

## 2024-01-10 PROCEDURE — 3075F SYST BP GE 130 - 139MM HG: CPT | Mod: CPTII,,, | Performed by: ORTHOPAEDIC SURGERY

## 2024-01-10 PROCEDURE — 3008F BODY MASS INDEX DOCD: CPT | Mod: CPTII,,, | Performed by: ORTHOPAEDIC SURGERY

## 2024-01-10 RX ORDER — GABAPENTIN 300 MG/1
300 CAPSULE ORAL 3 TIMES DAILY
Qty: 90 CAPSULE | Refills: 1 | Status: SHIPPED | OUTPATIENT
Start: 2024-01-10 | End: 2024-03-10

## 2024-01-10 RX ORDER — OXYCODONE HYDROCHLORIDE 10 MG/1
10 TABLET ORAL EVERY 12 HOURS PRN
Qty: 14 TABLET | Refills: 0 | Status: SHIPPED | OUTPATIENT
Start: 2024-01-10 | End: 2024-01-17

## 2024-01-10 RX ORDER — TIZANIDINE 2 MG/1
4 TABLET ORAL EVERY 8 HOURS PRN
Qty: 30 TABLET | Refills: 0 | Status: SHIPPED | OUTPATIENT
Start: 2024-01-10 | End: 2024-01-20

## 2024-01-10 NOTE — PROGRESS NOTES
"  Subjective:       Patient ID: Janeen Arrieta is a 48 y.o. female.  Chief Complaint   Patient presents with    Right Ankle - Follow-up     3.5 MONTH F/U LEFT ANKLE FUSION, ORIF RIGHT PILON FX, NWB TO RLE.          HPI    Patient presents for 3.5mo follow up left ankle fusion, approx 12 week follow up ORIF right pilon ankle fracture. She remains non weight bearing to bilateral lower extremities. In wheelchair today.  Patient is doing well.  She has not begin weight-bearing.  We had long discussions about her bilateral ankle injuries.  Dull achy pain to the right ankle worse with weight-bearing better rest.  No numbness no tingling    ROS:  Constitutional: Denies fever chills  Eyes: No change in vision  ENT: No ringing or current infections  CV: No chest pain  Resp: No labored breathing  MSK: Pain evident at site of injury located in HPI,   Integ: No signs of abrasions or lacerations  Neuro: No numbness or tingling  Lymphatic: No swelling outside the area of injury     Current Outpatient Medications on File Prior to Visit   Medication Sig Dispense Refill    aspirin 325 MG tablet Take 1 tablet (325 mg total) by mouth once daily. 90 tablet 0    gabapentin (NEURONTIN) 300 MG capsule Take 1 capsule (300 mg total) by mouth 3 (three) times daily. 90 capsule 1    methocarbamoL (ROBAXIN) 500 MG Tab Take 500 mg by mouth 4 (four) times daily.      oxyCODONE (ROXICODONE) 5 MG immediate release tablet Take 10 mg by mouth every 4 (four) hours as needed for Pain.       No current facility-administered medications on file prior to visit.          Objective:      /79   Pulse 63   Resp 18   Ht 5' 4" (1.626 m)   Wt 93 kg (205 lb 0.4 oz)   BMI 35.19 kg/m²   Physical Exam  General the patient is alert and oriented x3 no acute distress nontoxic-appearing appropriate affect.    Constitutional: Vital signs are examined and stable.  Resp: No signs of labored breathing    Musculoskeletal:     Right lower extremity: no signs " "erythema, drainage, signs of dehiscence, stable eschar; compartments are soft and compressible; Tolerates full passive ROM of the right ankle; no  tenderness to palpation; dorsi/plantar flexes the foot; SILT distally; BCR distally; DP pulse palpable  Left lower extremity: no signs of erythema, drainage, signs of dehiscence; compartments are soft and compressible; no ankle ROM attempted due to fusion; no  tenderness palpation of the base of the 5th metatarsal SILT distally; BCR distally; DP pulse palpable      Body mass index is 35.19 kg/m².  Ideal body weight: 54.7 kg (120 lb 9.5 oz)  Adjusted ideal body weight: 70 kg (154 lb 5.9 oz)  No results found for: "HGBA1C"  Hgb   Date Value Ref Range Status   09/28/2023 9.6 (L) 12.0 - 16.0 g/dL Final   09/27/2023 10.5 (L) 12.0 - 16.0 g/dL Final     Hct   Date Value Ref Range Status   09/28/2023 29.7 (L) 37.0 - 47.0 % Final   09/27/2023 32.4 (L) 37.0 - 47.0 % Final     No results found for: "IRON"  No components found for: "FROLATE"  Vit D 25 OH   Date Value Ref Range Status   11/29/2023 29.3 (L) 30.0 - 80.0 ng/mL Final   06/27/2017 23.31 (L) 30.00 - 80.00 ng/mL Final     WBC   Date Value Ref Range Status   09/28/2023 8.81 4.50 - 11.50 x10(3)/mcL Final   09/27/2023 9.35 4.50 - 11.50 x10(3)/mcL Final       Radiology: 3 view x ray right ankle: hardware intact without signs of loosening or failure.  Healing evident.  Concerned for persistent fracture line of the anterior Pilon    CT scan right ankle skeletally mature individual shows intact hardware and nonunion with osteolysis about the fracture line and tibial plafond hardware starting to just due to osteolysis about the small headless screws.    3 view x ray left ankle: TTC nail in place. Alignment well maintained as compared to post operative images. Hardware intact without loosening or failure.        Assessment:         1. Closed displaced pilon fracture of left tibia with routine healing, subsequent encounter  X-Ray " Ankle Complete Left    Ambulatory referral/consult to Physical/Occupational Therapy      2. Closed displaced pilon fracture of right tibia with routine healing, subsequent encounter  X-Ray Ankle Complete Right    Ambulatory referral/consult to Physical/Occupational Therapy              Plan:         No follow-ups on file.    Janeen was seen today for follow-up.    Diagnoses and all orders for this visit:    Closed displaced pilon fracture of left tibia with routine healing, subsequent encounter  -     X-Ray Ankle Complete Left; Future  -     Ambulatory referral/consult to Physical/Occupational Therapy; Future    Closed displaced pilon fracture of right tibia with routine healing, subsequent encounter  -     X-Ray Ankle Complete Right; Future  -     Ambulatory referral/consult to Physical/Occupational Therapy; Future        Patient needs a refill of her medications.  We have had another long discussion about her ankles.  They had have asked about total ankle arthroplasty.  In the setting of total ankle arthroplasty previous trauma is a contraindication.  I am concerned that she will likely go on to tibiotalar fusion on the right side.  I have offered surgical options.  I have also offered a non operative option where she ambulates on the nonunion until she would like to proceed with surgery.  It appears that they will continue with this recommendation at this time we will continue to evaluate her closely.  We have gone through the CT scans together and a pre and post injury and surgical films.  Patient is very happy with the care at this time we will continue to evaluate for further surgical options.    This note/OR report was created with the assistance of  voice recognition software or phone  dictation.  There may be transcription errors as a result of using this technology however minimal. Effort has been made to assure accuracy of transcription but any obvious errors or omissions should be clarified with the  author of the document.       Ryan Humphrey DO  Orthopedic Trauma Surgery         Future Appointments   Date Time Provider Department Center   2/29/2024 10:30 AM Ryan Humphrey DO [facility]

## 2024-01-12 ENCOUNTER — DOCUMENT SCAN (OUTPATIENT)
Dept: HOME HEALTH SERVICES | Facility: HOSPITAL | Age: 49
End: 2024-01-12
Payer: MEDICAID

## 2024-01-31 DIAGNOSIS — S82.872D CLOSED DISPLACED PILON FRACTURE OF LEFT TIBIA WITH ROUTINE HEALING, SUBSEQUENT ENCOUNTER: Primary | ICD-10-CM

## 2024-01-31 NOTE — TELEPHONE ENCOUNTER
Patient called and LVM asking for a refill of pain med as well as refill of tizadine.       Last pain med refill of Oxycodone(roxicodone) 10 mg of 14 tabs Q12 PRN last filled on 01/10/24 by [place]    Last Tizanidine refill of 30 tabs 4mg Q8 PRN was last filled on 12/14/23 but was discontinued.       Patient surgery was on 09/25/23 which makes her 4 mths out from sx.     Pain med pending at this time as nothing was indicated at her last visit in note regarding pain meds being d/c or if patient is able to have one last refill.

## 2024-02-01 RX ORDER — TIZANIDINE 4 MG/1
4 TABLET ORAL 3 TIMES DAILY PRN
Qty: 30 TABLET | Refills: 0 | Status: SHIPPED | OUTPATIENT
Start: 2024-02-01 | End: 2024-02-11

## 2024-02-01 RX ORDER — OXYCODONE HYDROCHLORIDE 5 MG/1
10 TABLET ORAL EVERY 4 HOURS PRN
Qty: 7 TABLET | Refills: 0 | Status: SHIPPED | OUTPATIENT
Start: 2024-02-01 | End: 2024-05-02

## 2024-02-21 RX ORDER — IBUPROFEN 800 MG/1
800 TABLET ORAL 3 TIMES DAILY PRN
Qty: 30 TABLET | Refills: 0 | Status: SHIPPED | OUTPATIENT
Start: 2024-02-21 | End: 2024-03-02

## 2024-02-21 NOTE — TELEPHONE ENCOUNTER
Patient called and LVM advising she was told by Dr. Humphrey to try taking four ibproufen for inflammation. States has been doing this and has been helping.     Wanting Ibproufen 800 mg to be prescribed to her

## 2024-02-29 ENCOUNTER — OFFICE VISIT (OUTPATIENT)
Dept: ORTHOPEDICS | Facility: CLINIC | Age: 49
End: 2024-02-29
Payer: MEDICAID

## 2024-02-29 ENCOUNTER — HOSPITAL ENCOUNTER (OUTPATIENT)
Dept: RADIOLOGY | Facility: CLINIC | Age: 49
Discharge: HOME | End: 2024-02-29
Attending: ORTHOPAEDIC SURGERY
Payer: MEDICAID

## 2024-02-29 VITALS
WEIGHT: 205 LBS | HEART RATE: 72 BPM | SYSTOLIC BLOOD PRESSURE: 127 MMHG | DIASTOLIC BLOOD PRESSURE: 77 MMHG | HEIGHT: 64 IN | BODY MASS INDEX: 35 KG/M2

## 2024-02-29 DIAGNOSIS — S82.872D CLOSED DISPLACED PILON FRACTURE OF LEFT TIBIA WITH ROUTINE HEALING, SUBSEQUENT ENCOUNTER: ICD-10-CM

## 2024-02-29 DIAGNOSIS — S82.871D CLOSED DISPLACED PILON FRACTURE OF RIGHT TIBIA WITH ROUTINE HEALING, SUBSEQUENT ENCOUNTER: ICD-10-CM

## 2024-02-29 DIAGNOSIS — S82.872D CLOSED DISPLACED PILON FRACTURE OF LEFT TIBIA WITH ROUTINE HEALING, SUBSEQUENT ENCOUNTER: Primary | ICD-10-CM

## 2024-02-29 PROCEDURE — 99213 OFFICE O/P EST LOW 20 MIN: CPT | Mod: ,,, | Performed by: PHYSICIAN ASSISTANT

## 2024-02-29 PROCEDURE — 73610 X-RAY EXAM OF ANKLE: CPT | Mod: RT,,, | Performed by: ORTHOPAEDIC SURGERY

## 2024-02-29 PROCEDURE — 1159F MED LIST DOCD IN RCRD: CPT | Mod: CPTII,,, | Performed by: PHYSICIAN ASSISTANT

## 2024-02-29 PROCEDURE — 3008F BODY MASS INDEX DOCD: CPT | Mod: CPTII,,, | Performed by: PHYSICIAN ASSISTANT

## 2024-02-29 PROCEDURE — 3078F DIAST BP <80 MM HG: CPT | Mod: CPTII,,, | Performed by: PHYSICIAN ASSISTANT

## 2024-02-29 PROCEDURE — 3074F SYST BP LT 130 MM HG: CPT | Mod: CPTII,,, | Performed by: PHYSICIAN ASSISTANT

## 2024-02-29 PROCEDURE — 73610 X-RAY EXAM OF ANKLE: CPT | Mod: LT,,, | Performed by: ORTHOPAEDIC SURGERY

## 2024-02-29 RX ORDER — TIZANIDINE HYDROCHLORIDE 2 MG/1
CAPSULE, GELATIN COATED ORAL
COMMUNITY
End: 2024-03-13 | Stop reason: SDUPTHER

## 2024-02-29 NOTE — PROGRESS NOTES
Subjective:       Patient ID: Janeen Arrieta is a 48 y.o. female.  Chief Complaint   Patient presents with    Left Ankle - Post-op Evaluation     5 month out, Left ANKLE FUSION,  sx 9/25/23, left ankle pain, janis pain at night and walking, bilaterl boot, wheelchair, no other complaints at this time    Right Ankle - Post-op Evaluation     5 months out, ORIF RIGHT PILON FX, sx 9/25/23, radiates up from the ankle to the shin,         HPI    Patient presents for 5 mo follow up left ankle fusion, approx 4.5 week follow up ORIF right pilon ankle fracture. Has been WB in the CAM boots. Having some pain. Standing mostly on the right as she feels she can't get her heel down on the left. She is working with therapy. Has good range of motion at the right ankle but still having deep achy pain pain worse with weight bearing on that side. She has not attempted WB outside of her boots. They are ill fitting. We will see if we can get her some new ones today. She has no numbness or tingling distally to the bilateral lower ext. Some pain on the left ankle intermittently on the medial and lateral side. Right side with some radiating pain up from the ankle to approx mid thigh.     ROS:  Constitutional: Denies fever chills  Eyes: No change in vision  ENT: No ringing or current infections  CV: No chest pain  Resp: No labored breathing  MSK: Pain evident at site of injury located in HPI,   Integ: No signs of abrasions or lacerations  Neuro: No numbness or tingling  Lymphatic: No swelling outside the area of injury     Current Outpatient Medications on File Prior to Visit   Medication Sig Dispense Refill    gabapentin (NEURONTIN) 300 MG capsule Take 1 capsule (300 mg total) by mouth 3 (three) times daily. 90 capsule 1    ibuprofen (ADVIL,MOTRIN) 800 MG tablet Take 1 tablet (800 mg total) by mouth 3 (three) times daily as needed for Pain. 30 tablet 0    oxyCODONE (ROXICODONE) 5 MG immediate release tablet Take 2 tablets (10 mg total) by  "mouth every 4 (four) hours as needed for Pain. 7 tablet 0    tiZANidine 2 mg Cap Take by mouth.      aspirin 325 MG tablet Take 1 tablet (325 mg total) by mouth once daily. 90 tablet 0    methocarbamoL (ROBAXIN) 500 MG Tab Take 500 mg by mouth 4 (four) times daily.       No current facility-administered medications on file prior to visit.          Objective:      /77 (BP Location: Right arm, Patient Position: Sitting, BP Method: Medium (Automatic))   Pulse 72   Ht 5' 4" (1.626 m)   Wt 93 kg (205 lb)   BMI 35.19 kg/m²   Physical Exam  General the patient is alert and oriented x3 no acute distress nontoxic-appearing appropriate affect.    Constitutional: Vital signs are examined and stable.  Resp: No signs of labored breathing    Musculoskeletal:     Right lower extremity: no signs erythema, drainage, signs of dehiscence, stable eschar; compartments are soft and compressible; Tolerates full passive ROM of the right ankle; no  tenderness to palpation; dorsi/plantar flexes the foot; SILT distally; BCR distally; DP pulse palpable  Left lower extremity: no signs of erythema, drainage, signs of dehiscence; compartments are soft and compressible; no ankle ROM attempted due to fusion; no tenderness palpation of the base of the 5th metatarsal SILT distally; BCR distally; DP pulse palpable      Body mass index is 35.19 kg/m².  Ideal body weight: 54.7 kg (120 lb 9.5 oz)  Adjusted ideal body weight: 70 kg (154 lb 5.7 oz)  No results found for: "HGBA1C"  Hgb   Date Value Ref Range Status   09/28/2023 9.6 (L) 12.0 - 16.0 g/dL Final   09/27/2023 10.5 (L) 12.0 - 16.0 g/dL Final     Hct   Date Value Ref Range Status   09/28/2023 29.7 (L) 37.0 - 47.0 % Final   09/27/2023 32.4 (L) 37.0 - 47.0 % Final     No results found for: "IRON"  No components found for: "FROLATE"  Vit D 25 OH   Date Value Ref Range Status   11/29/2023 29.3 (L) 30.0 - 80.0 ng/mL Final   06/27/2017 23.31 (L) 30.00 - 80.00 ng/mL Final     WBC   Date Value " Ref Range Status   09/28/2023 8.81 4.50 - 11.50 x10(3)/mcL Final   09/27/2023 9.35 4.50 - 11.50 x10(3)/mcL Final       Radiology: 3 view x ray right ankle: hardware intact without signs of loosening or failure.  Healing evident. Fracture line appears to be consolidating in although there is still a radiolucent area at the area of fragment specific fixation.    3 view x ray left ankle: TTC nail in place. Alignment well maintained as compared to post operative images. Hardware intact without loosening or failure. Appears to be fusing as expected.         Assessment:         1. Closed displaced pilon fracture of left tibia with routine healing, subsequent encounter  X-Ray Ankle Complete Left      2. Closed displaced pilon fracture of right tibia with routine healing, subsequent encounter  X-Ray Ankle Complete Right              Plan:         No follow-ups on file.    Janeen was seen today for post-op evaluation and post-op evaluation.    Diagnoses and all orders for this visit:    Closed displaced pilon fracture of left tibia with routine healing, subsequent encounter  -     X-Ray Ankle Complete Left; Future    Closed displaced pilon fracture of right tibia with routine healing, subsequent encounter  -     X-Ray Ankle Complete Right; Future      Although she is not ambulating much outside of the boots. She has made some progress with weight bearing. She is having some issues with not getting heel down to the LLE. We will trial a heel lift in a regular shoe to see if this is Comfortable for her. She is awaiting her [place] brace for the left side as well. No obvious change to the right ankle pain wise. She has been unable to transition out of her boots. We will get her a lace up ankle brace today. Her boots are ill fitting and straps are worn and it is unable to be used appropriately at this time. I recommend new boots for her to continue to get the orthotic benefit that she needs, We will get this for her in office  today. Order sent to [facility] for heel lift in combo with [place] bracing.   Taking ibuprofen 800 mg, tizanidine, and gabapentin only. No longer taking narcotic pain medications. Will continue to trail her with transition out of boots. Again discussed possibility of fusion to the RLE. Possible CT scan to evaluate healing/plafond again in the future if she continues to have pain prior to further surgical intervention. Follow up in 6-8 weeks for repeat evaluation of her progress or sooner if her pain worsens or she has any concerns prior to this.     The above findings, diagnostics, and treatment plan were discussed with Dr. Humphrey who is in agreement with the plan of care except as stated in additional documentation.     Alexandra Blair Lemaire, PA-C [facility]   Orthopedic Trauma          Future Appointments   Date Time Provider Department Center   4/15/2024  9:45 AM Ryan Humphrey DO [facility]

## 2024-03-10 DIAGNOSIS — S82.872D CLOSED DISPLACED PILON FRACTURE OF LEFT TIBIA WITH ROUTINE HEALING, SUBSEQUENT ENCOUNTER: ICD-10-CM

## 2024-03-10 DIAGNOSIS — S82.871D CLOSED DISPLACED PILON FRACTURE OF RIGHT TIBIA WITH ROUTINE HEALING, SUBSEQUENT ENCOUNTER: ICD-10-CM

## 2024-03-11 RX ORDER — GABAPENTIN 300 MG/1
300 CAPSULE ORAL 3 TIMES DAILY
Qty: 90 CAPSULE | Refills: 0 | Status: SHIPPED | OUTPATIENT
Start: 2024-03-11 | End: 2024-03-13 | Stop reason: SDUPTHER

## 2024-03-13 DIAGNOSIS — S82.871D CLOSED DISPLACED PILON FRACTURE OF RIGHT TIBIA WITH ROUTINE HEALING, SUBSEQUENT ENCOUNTER: Primary | ICD-10-CM

## 2024-03-13 DIAGNOSIS — S82.872D CLOSED DISPLACED PILON FRACTURE OF LEFT TIBIA WITH ROUTINE HEALING, SUBSEQUENT ENCOUNTER: ICD-10-CM

## 2024-03-13 RX ORDER — OXYCODONE AND ACETAMINOPHEN 5; 325 MG/1; MG/1
1 TABLET ORAL EVERY 8 HOURS PRN
Qty: 21 TABLET | Refills: 0 | OUTPATIENT
Start: 2024-03-13 | End: 2024-03-20

## 2024-03-13 RX ORDER — TIZANIDINE HYDROCHLORIDE 2 MG/1
4 CAPSULE, GELATIN COATED ORAL 3 TIMES DAILY PRN
Qty: 42 CAPSULE | Refills: 0 | Status: SHIPPED | OUTPATIENT
Start: 2024-03-13 | End: 2024-03-15

## 2024-03-13 RX ORDER — GABAPENTIN 300 MG/1
300 CAPSULE ORAL 3 TIMES DAILY
Qty: 90 CAPSULE | Refills: 0 | Status: SHIPPED | OUTPATIENT
Start: 2024-03-13 | End: 2024-04-12

## 2024-03-15 DIAGNOSIS — S82.871D CLOSED DISPLACED PILON FRACTURE OF RIGHT TIBIA WITH ROUTINE HEALING, SUBSEQUENT ENCOUNTER: ICD-10-CM

## 2024-03-15 RX ORDER — TIZANIDINE 4 MG/1
4 TABLET ORAL EVERY 8 HOURS
Qty: 42 TABLET | Refills: 0 | Status: SHIPPED | OUTPATIENT
Start: 2024-03-15 | End: 2024-03-29

## 2024-04-15 ENCOUNTER — OFFICE VISIT (OUTPATIENT)
Dept: ORTHOPEDICS | Facility: CLINIC | Age: 49
End: 2024-04-15
Payer: MEDICAID

## 2024-04-15 ENCOUNTER — HOSPITAL ENCOUNTER (OUTPATIENT)
Dept: RADIOLOGY | Facility: CLINIC | Age: 49
Discharge: HOME | End: 2024-04-15
Attending: ORTHOPAEDIC SURGERY
Payer: MEDICAID

## 2024-04-15 DIAGNOSIS — S82.871D CLOSED DISPLACED PILON FRACTURE OF RIGHT TIBIA WITH ROUTINE HEALING, SUBSEQUENT ENCOUNTER: ICD-10-CM

## 2024-04-15 DIAGNOSIS — S82.872D CLOSED DISPLACED PILON FRACTURE OF LEFT TIBIA WITH ROUTINE HEALING, SUBSEQUENT ENCOUNTER: Primary | ICD-10-CM

## 2024-04-15 DIAGNOSIS — S82.872D CLOSED DISPLACED PILON FRACTURE OF LEFT TIBIA WITH ROUTINE HEALING, SUBSEQUENT ENCOUNTER: ICD-10-CM

## 2024-04-15 PROCEDURE — G2211 COMPLEX E/M VISIT ADD ON: HCPCS | Mod: ,,, | Performed by: ORTHOPAEDIC SURGERY

## 2024-04-15 PROCEDURE — 99213 OFFICE O/P EST LOW 20 MIN: CPT | Mod: ,,, | Performed by: ORTHOPAEDIC SURGERY

## 2024-04-15 PROCEDURE — 73610 X-RAY EXAM OF ANKLE: CPT | Mod: LT,,, | Performed by: ORTHOPAEDIC SURGERY

## 2024-04-15 PROCEDURE — 1159F MED LIST DOCD IN RCRD: CPT | Mod: CPTII,,, | Performed by: ORTHOPAEDIC SURGERY

## 2024-04-15 PROCEDURE — 73610 X-RAY EXAM OF ANKLE: CPT | Mod: RT,,, | Performed by: ORTHOPAEDIC SURGERY

## 2024-04-15 NOTE — PROGRESS NOTES
Subjective:       Patient ID: Janeen Arrieta is a 48 y.o. female.  Chief Complaint   Patient presents with    Follow-up     6 mths,  Left ANKLE FUSION, ORIF RIGHT PILON FX, sx 9/25/23, presents in wheelchair, has boots on both ankles, states has been doing therapy, states does walk as well with walker, states pain comes and goes but managed with otc meds, has no other complaints at this time,         HPI    Patient is here 6 months out from left ankle fusion.  She has been doing better.  She is working herself out of the boots.  She is dull achy pain in both ankles as expected with severe injuries.  We have gone over CT results a completed a couple of months ago.  She is here for further evaluation.  We discussed surgical options.  She is dull achy pain and some swelling in both legs stealth difficult to ambulate at times.  She is still in physical therapy she is having some sensation changes of her left foot on the plantar aspect and dorsal aspect that is come back most recently.    ROS:  Constitutional: Denies fever chills  Eyes: No change in vision  ENT: No ringing or current infections  CV: No chest pain  Resp: No labored breathing  MSK: Pain evident at site of injury located in HPI,   Integ: No signs of abrasions or lacerations  Neuro: No numbness or tingling  Lymphatic: No swelling outside the area of injury     Current Outpatient Medications on File Prior to Visit   Medication Sig Dispense Refill    oxyCODONE (ROXICODONE) 5 MG immediate release tablet Take 2 tablets (10 mg total) by mouth every 4 (four) hours as needed for Pain. 7 tablet 0    aspirin 325 MG tablet Take 1 tablet (325 mg total) by mouth once daily. 90 tablet 0    gabapentin (NEURONTIN) 300 MG capsule Take 1 capsule (300 mg total) by mouth 3 (three) times daily. 90 capsule 0    methocarbamoL (ROBAXIN) 500 MG Tab Take 500 mg by mouth 4 (four) times daily.       No current facility-administered medications on file prior to visit.         "  Objective:      There were no vitals taken for this visit.  Physical Exam  General the patient is alert and oriented x3 no acute distress nontoxic-appearing appropriate affect.    Constitutional: Vital signs are examined and stable.  Resp: No signs of labored breathing    Musculoskeletal:     Right lower extremity: no signs erythema, drainage, signs of dehiscence, stable eschar; compartments are soft and compressible; Tolerates full passive ROM of the right ankle; no  tenderness to palpation; dorsi/plantar flexes the foot; SILT distally; BCR distally; DP pulse palpable  Left lower extremity:  Point tenderness palpation of the plantar aspect of the foot of the origin of the plantar fascia no signs of erythema, drainage, signs of dehiscence; compartments are soft and compressible; no ankle ROM attempted due to fusion; no tenderness palpation of the base of the 5th metatarsal SILT distally; BCR distally; DP pulse palpable      There is no height or weight on file to calculate BMI.  Patient weight not recorded  No results found for: "HGBA1C"  Hgb   Date Value Ref Range Status   09/28/2023 9.6 (L) 12.0 - 16.0 g/dL Final   09/27/2023 10.5 (L) 12.0 - 16.0 g/dL Final     Hct   Date Value Ref Range Status   09/28/2023 29.7 (L) 37.0 - 47.0 % Final   09/27/2023 32.4 (L) 37.0 - 47.0 % Final     No results found for: "IRON"  No components found for: "FROLATE"  Vit D 25 OH   Date Value Ref Range Status   11/29/2023 29.3 (L) 30.0 - 80.0 ng/mL Final   06/27/2017 23.31 (L) 30.00 - 80.00 ng/mL Final     WBC   Date Value Ref Range Status   09/28/2023 8.81 4.50 - 11.50 x10(3)/mcL Final   09/27/2023 9.35 4.50 - 11.50 x10(3)/mcL Final       Radiology: 3 view x ray right ankle: hardware intact without signs of loosening or failure.  Healing evident.  Previous CT shows headless compression screw position changing likely due to osteonecrosis of the bone.  Patient also has when headless compression screws through the fibula.  3 view x ray " left ankle: TTC nail in place. Alignment well maintained as compared to post operative images. Hardware intact without loosening or failure.  Probable union.  Recommend CT to rule out nonunion        Assessment:         1. Closed displaced pilon fracture of left tibia with routine healing, subsequent encounter  X-Ray Ankle Complete Left      2. Closed displaced pilon fracture of right tibia with routine healing, subsequent encounter  X-Ray Ankle Complete Right              Plan:         No follow-ups on file.    Janeen was seen today for follow-up.    Diagnoses and all orders for this visit:    Closed displaced pilon fracture of left tibia with routine healing, subsequent encounter  -     X-Ray Ankle Complete Left; Future    Closed displaced pilon fracture of right tibia with routine healing, subsequent encounter  -     X-Ray Ankle Complete Right; Future      We have had multiple extensive discussions of her severe injuries.  It does sound like her activities increasing.  From her left lower extremity she is using a [place] brace.  She feels like she is walking on the outside of her foot which we have recommend a over-the-counter orthotic with a toe well.  We have discussed surgical options around this.  She does have pain of the plantar fascia origin point tenderness in the area we discussed injection she is agreeable to injection we sterile technique placed the injection of 3 cc of Marcaine and steroid in this area without complication.  We have discussed possible union versus nonunion of the hindfoot fusion nail was difficult to appreciate laterally.  We have discussed removal of the nail placement of a straight nail from [employer] she has not agreeable at this time.  She is having sensation changes to the plantar and dorsum of her foot that is has returned.  Overall she would like to refrain from surgery at this time.    The also discussed with her right ankle had an anterior plate.  She is having some swelling  and some pain over peroneals and some weakness but overall doing quite well.  She has not interested in hardware removal at this time we discussed the location of the headless compression screws again that we would like to continue with nonoperative management.    This note/OR report was created with the assistance of  voice recognition software or phone  dictation.  There may be transcription errors as a result of using this technology however minimal. Effort has been made to assure accuracy of transcription but any obvious errors or omissions should be clarified with the author of the document.       Ryan Humphrey, DO  Orthopedic Trauma Surgery         No future appointments.

## 2024-05-02 DIAGNOSIS — S82.872D CLOSED DISPLACED PILON FRACTURE OF LEFT TIBIA WITH ROUTINE HEALING, SUBSEQUENT ENCOUNTER: Primary | ICD-10-CM

## 2024-05-02 RX ORDER — TRAMADOL HYDROCHLORIDE 50 MG/1
50 TABLET ORAL
Qty: 7 EACH | Refills: 0 | Status: SHIPPED | OUTPATIENT
Start: 2024-05-02 | End: 2024-05-09

## 2024-05-02 NOTE — TELEPHONE ENCOUNTER
Patient called stating she is having increase in pain. She is currently taking tylenol and ibuprofen 800 with minimum relief. She does see PCP next week and is requesting medication to get her to appointment. She is allergic to Mobic. Informed patient she may check with pharmacy for medication.

## 2024-05-08 DIAGNOSIS — Z12.31 ENCOUNTER FOR SCREENING MAMMOGRAM FOR MALIGNANT NEOPLASM OF BREAST: Primary | ICD-10-CM

## 2024-05-16 ENCOUNTER — HOSPITAL ENCOUNTER (OUTPATIENT)
Dept: RADIOLOGY | Facility: HOSPITAL | Age: 49
Discharge: HOME | End: 2024-05-16
Attending: STUDENT IN AN ORGANIZED HEALTH CARE EDUCATION/TRAINING PROGRAM
Payer: MEDICAID

## 2024-05-16 DIAGNOSIS — Z12.31 ENCOUNTER FOR SCREENING MAMMOGRAM FOR MALIGNANT NEOPLASM OF BREAST: ICD-10-CM

## 2024-05-16 PROCEDURE — 77067 SCR MAMMO BI INCL CAD: CPT | Mod: TC

## 2024-05-16 PROCEDURE — 77067 SCR MAMMO BI INCL CAD: CPT | Mod: 26,,, | Performed by: STUDENT IN AN ORGANIZED HEALTH CARE EDUCATION/TRAINING PROGRAM

## 2024-05-16 PROCEDURE — 77063 BREAST TOMOSYNTHESIS BI: CPT | Mod: 26,,, | Performed by: STUDENT IN AN ORGANIZED HEALTH CARE EDUCATION/TRAINING PROGRAM

## 2024-05-16 PROCEDURE — 77063 BREAST TOMOSYNTHESIS BI: CPT | Mod: TC

## 2024-07-30 ENCOUNTER — OFFICE VISIT (OUTPATIENT)
Dept: ORTHOPEDICS | Facility: CLINIC | Age: 49
End: 2024-07-30
Payer: MEDICAID

## 2024-07-30 ENCOUNTER — HOSPITAL ENCOUNTER (OUTPATIENT)
Dept: RADIOLOGY | Facility: CLINIC | Age: 49
Discharge: HOME | End: 2024-07-30
Attending: ORTHOPAEDIC SURGERY
Payer: MEDICAID

## 2024-07-30 VITALS — WEIGHT: 205 LBS | HEIGHT: 64 IN | BODY MASS INDEX: 35 KG/M2

## 2024-07-30 DIAGNOSIS — S82.871D CLOSED DISPLACED PILON FRACTURE OF RIGHT TIBIA WITH ROUTINE HEALING, SUBSEQUENT ENCOUNTER: ICD-10-CM

## 2024-07-30 DIAGNOSIS — S82.872D CLOSED DISPLACED PILON FRACTURE OF LEFT TIBIA WITH ROUTINE HEALING, SUBSEQUENT ENCOUNTER: Primary | ICD-10-CM

## 2024-07-30 DIAGNOSIS — S82.872D CLOSED DISPLACED PILON FRACTURE OF LEFT TIBIA WITH ROUTINE HEALING, SUBSEQUENT ENCOUNTER: ICD-10-CM

## 2024-07-30 PROCEDURE — 99213 OFFICE O/P EST LOW 20 MIN: CPT | Mod: ,,, | Performed by: ORTHOPAEDIC SURGERY

## 2024-07-30 PROCEDURE — 1159F MED LIST DOCD IN RCRD: CPT | Mod: CPTII,,, | Performed by: ORTHOPAEDIC SURGERY

## 2024-07-30 PROCEDURE — 73610 X-RAY EXAM OF ANKLE: CPT | Mod: RT,,, | Performed by: ORTHOPAEDIC SURGERY

## 2024-07-30 PROCEDURE — 3008F BODY MASS INDEX DOCD: CPT | Mod: CPTII,,, | Performed by: ORTHOPAEDIC SURGERY

## 2024-07-30 PROCEDURE — 73610 X-RAY EXAM OF ANKLE: CPT | Mod: LT,,, | Performed by: ORTHOPAEDIC SURGERY

## 2024-07-30 RX ORDER — ERGOCALCIFEROL 1.25 MG/1
1 CAPSULE ORAL
COMMUNITY

## 2024-07-30 RX ORDER — DULOXETIN HYDROCHLORIDE 60 MG/1
1 CAPSULE, DELAYED RELEASE ORAL DAILY
COMMUNITY
Start: 2024-05-08

## 2024-07-30 RX ORDER — IBUPROFEN 600 MG/1
TABLET ORAL
COMMUNITY
Start: 2024-05-08

## 2024-07-30 RX ORDER — TIZANIDINE HYDROCHLORIDE 4 MG/1
CAPSULE, GELATIN COATED ORAL
COMMUNITY
Start: 2024-05-08

## 2024-07-30 RX ORDER — OMEPRAZOLE 20 MG/1
CAPSULE, DELAYED RELEASE ORAL
COMMUNITY
Start: 2024-05-08

## 2024-07-30 RX ORDER — PREGABALIN 150 MG/1
1 CAPSULE ORAL 2 TIMES DAILY PRN
COMMUNITY
Start: 2024-05-08

## 2024-07-30 NOTE — PROGRESS NOTES
"    Subjective:       Patient ID: Janeen Arrieta is a 48 y.o. female.  Chief Complaint   Patient presents with    Left Ankle - Follow-up     10 month f/u from left ankle fusion, ORIF right pilon fx. Ambulates with walker at home. Reports increase in aching pain.         Follow-up        Patient is here 10 mo months out from left ankle fusion.  She has been doing better.  Here today with ankle brace.  She states she has walks at points without the walker although she does does not walk efficiently.  She has pain in her right greater than her left.  Some swelling in that area.  Here with her  again today.  We will other changes of fevers no chills    ROS:  Constitutional: Denies fever chills  Eyes: No change in vision  ENT: No ringing or current infections  CV: No chest pain  Resp: No labored breathing  MSK: Pain evident at site of injury located in HPI,   Integ: No signs of abrasions or lacerations  Neuro: No numbness or tingling  Lymphatic: No swelling outside the area of injury     Current Outpatient Medications on File Prior to Visit   Medication Sig Dispense Refill    DULoxetine (CYMBALTA) 60 MG capsule Take 1 capsule by mouth once daily.      ergocalciferol (ERGOCALCIFEROL) 50,000 unit Cap Take 1 capsule by mouth every 7 days.      ibuprofen (ADVIL,MOTRIN) 600 MG tablet       omeprazole (PRILOSEC) 20 MG capsule       pregabalin (LYRICA) 150 MG capsule Take 1 capsule by mouth 2 (two) times daily as needed.      tiZANidine 4 mg Cap TAKE 1 TABLET BY MOUTH TWICE DAILY AS NEEDED FOR SPASM/PAIN      aspirin 325 MG tablet Take 1 tablet (325 mg total) by mouth once daily. 90 tablet 0    gabapentin (NEURONTIN) 300 MG capsule Take 1 capsule (300 mg total) by mouth 3 (three) times daily. 90 capsule 0    methocarbamoL (ROBAXIN) 500 MG Tab Take 500 mg by mouth 4 (four) times daily.       No current facility-administered medications on file prior to visit.          Objective:      Ht 5' 4" (1.626 m)   Wt 93 kg (205 lb " "0.4 oz)   BMI 35.19 kg/m²   Physical Exam  General the patient is alert and oriented x3 no acute distress nontoxic-appearing appropriate affect.    Constitutional: Vital signs are examined and stable.  Resp: No signs of labored breathing    Musculoskeletal:     Right lower extremity: no signs erythema, drainage, signs of dehiscence, stable eschar; compartments are soft and compressible; Tolerates full passive ROM of the right ankle; mild to moderate  tenderness to palpation laterally; dorsi/plantar flexes the foot; SILT distally; BCR distally; DP pulse palpable  Left lower extremity:  Point tenderness palpation of the plantar aspect of the foot of the origin of the plantar fascia no signs of erythema, drainage, signs of dehiscence; compartments are soft and compressible; no ankle ROM attempted due to fusion; no tenderness palpation of the base of the 5th metatarsal SILT distally; BCR distally; DP pulse palpable      Body mass index is 35.19 kg/m².  Ideal body weight: 54.7 kg (120 lb 9.5 oz)  Adjusted ideal body weight: 70 kg (154 lb 5.9 oz)  No results found for: "HGBA1C"  Hgb   Date Value Ref Range Status   09/28/2023 9.6 (L) 12.0 - 16.0 g/dL Final   09/27/2023 10.5 (L) 12.0 - 16.0 g/dL Final     Hct   Date Value Ref Range Status   09/28/2023 29.7 (L) 37.0 - 47.0 % Final   09/27/2023 32.4 (L) 37.0 - 47.0 % Final     No results found for: "IRON"  No components found for: "FROLATE"  Vitamin D   Date Value Ref Range Status   11/29/2023 29.3 (L) 30.0 - 80.0 ng/mL Final   06/27/2017 23.31 (L) 30.00 - 80.00 ng/mL Final     WBC   Date Value Ref Range Status   09/28/2023 8.81 4.50 - 11.50 x10(3)/mcL Final   09/27/2023 9.35 4.50 - 11.50 x10(3)/mcL Final       Radiology: 3 view x ray right ankle: hardware intact without signs of loosening or failure.  Healing evident.    3 view x ray left ankle: TTC nail in place. Alignment well maintained as compared to post operative images. Hardware intact without loosening or failure. " Probable union      Assessment:         1. Closed displaced pilon fracture of left tibia with routine healing, subsequent encounter  X-Ray Ankle Complete Left      2. Closed displaced pilon fracture of right tibia with routine healing, subsequent encounter  X-Ray Ankle Complete Right    CT Ankle (Including Hindfoot) Without Contrast Right              Plan:         No follow-ups on file.    Janeen was seen today for follow-up.    Diagnoses and all orders for this visit:    Closed displaced pilon fracture of left tibia with routine healing, subsequent encounter  -     X-Ray Ankle Complete Left; Future    Closed displaced pilon fracture of right tibia with routine healing, subsequent encounter  -     X-Ray Ankle Complete Right; Future  -     CT Ankle (Including Hindfoot) Without Contrast Right; Future      Patient is here today for discussion.  Her right ankle she appears to be walking externally rotated for pain control.  I have offered her removal of hardware of the small headless compression screws.  We will order a CT scan to evaluate for bone healing on the anterior 3rd of the tibial plafond.  Her left ankle some swelling that is some dull achy pain but overall stable likely full union of her hindfoot fusion.  Patient has severe bilateral lower extremity fractures with likely functional and permanent pain.  We will continue to evaluate the patient for possible hardware removal.  Patient was at risk for needing repeat arthrocentesis arthrodesis of the left and primary arthrodesis of the right.    This note/OR report was created with the assistance of  voice recognition software or phone  dictation.  There may be transcription errors as a result of using this technology however minimal. Effort has been made to assure accuracy of transcription but any obvious errors or omissions should be clarified with the author of the document.       Ryan Humphrey, DO  Orthopedic Trauma Surgery         Future Appointments   Date  Time Provider Department Center   9/12/2024  9:00 AM Ryan Humphrey, DO [facility]

## 2024-08-06 ENCOUNTER — HOSPITAL ENCOUNTER (OUTPATIENT)
Dept: RADIOLOGY | Facility: HOSPITAL | Age: 49
Discharge: HOME | End: 2024-08-06
Attending: ORTHOPAEDIC SURGERY
Payer: MEDICAID

## 2024-08-06 DIAGNOSIS — S82.871D CLOSED DISPLACED PILON FRACTURE OF RIGHT TIBIA WITH ROUTINE HEALING, SUBSEQUENT ENCOUNTER: ICD-10-CM

## 2024-08-06 PROCEDURE — 73700 CT LOWER EXTREMITY W/O DYE: CPT | Mod: TC,RT

## 2024-09-12 ENCOUNTER — OFFICE VISIT (OUTPATIENT)
Dept: ORTHOPEDICS | Facility: CLINIC | Age: 49
End: 2024-09-12
Payer: MEDICAID

## 2024-09-12 VITALS
DIASTOLIC BLOOD PRESSURE: 89 MMHG | SYSTOLIC BLOOD PRESSURE: 150 MMHG | BODY MASS INDEX: 35 KG/M2 | HEART RATE: 79 BPM | WEIGHT: 205 LBS | HEIGHT: 64 IN

## 2024-09-12 DIAGNOSIS — S82.872D CLOSED DISPLACED PILON FRACTURE OF LEFT TIBIA WITH ROUTINE HEALING, SUBSEQUENT ENCOUNTER: Primary | ICD-10-CM

## 2024-09-12 DIAGNOSIS — S82.871D CLOSED DISPLACED PILON FRACTURE OF RIGHT TIBIA WITH ROUTINE HEALING, SUBSEQUENT ENCOUNTER: ICD-10-CM

## 2024-09-12 PROCEDURE — 3079F DIAST BP 80-89 MM HG: CPT | Mod: CPTII,,, | Performed by: ORTHOPAEDIC SURGERY

## 2024-09-12 PROCEDURE — 3077F SYST BP >= 140 MM HG: CPT | Mod: CPTII,,, | Performed by: ORTHOPAEDIC SURGERY

## 2024-09-12 PROCEDURE — 3008F BODY MASS INDEX DOCD: CPT | Mod: CPTII,,, | Performed by: ORTHOPAEDIC SURGERY

## 2024-09-12 PROCEDURE — 1159F MED LIST DOCD IN RCRD: CPT | Mod: CPTII,,, | Performed by: ORTHOPAEDIC SURGERY

## 2024-09-12 PROCEDURE — 99214 OFFICE O/P EST MOD 30 MIN: CPT | Mod: ,,, | Performed by: ORTHOPAEDIC SURGERY

## 2024-09-12 NOTE — PROGRESS NOTES
"    Subjective:       Patient ID: Janeen Arrieta is a 48 y.o. female.  Chief Complaint   Patient presents with    Left Ankle - Follow-up     1 year month f/u from left ankle fusion, ORIF right pilon fx. Ambulates with walker at home. Reports increase in aching pain.         Follow-up        Patient is here 1 year out from left ankle fusion.  She is doing well.  She does have pain problems with balance.  No fevers no chills.  The numbness and tingling in her left foot comes and goes.  She states some beds were some days are worse than others.  No signs of infection.  Here with her  as usual today.    ROS:  Constitutional: Denies fever chills  Eyes: No change in vision  ENT: No ringing or current infections  CV: No chest pain  Resp: No labored breathing  MSK: Pain evident at site of injury located in HPI,   Integ: No signs of abrasions or lacerations  Neuro: No numbness or tingling  Lymphatic: No swelling outside the area of injury     Current Outpatient Medications on File Prior to Visit   Medication Sig Dispense Refill    aspirin 325 MG tablet Take 1 tablet (325 mg total) by mouth once daily. 90 tablet 0    DULoxetine (CYMBALTA) 60 MG capsule Take 1 capsule by mouth once daily.      ergocalciferol (ERGOCALCIFEROL) 50,000 unit Cap Take 1 capsule by mouth every 7 days.      gabapentin (NEURONTIN) 300 MG capsule Take 1 capsule (300 mg total) by mouth 3 (three) times daily. 90 capsule 0    ibuprofen (ADVIL,MOTRIN) 600 MG tablet       methocarbamoL (ROBAXIN) 500 MG Tab Take 500 mg by mouth 4 (four) times daily.      omeprazole (PRILOSEC) 20 MG capsule       pregabalin (LYRICA) 150 MG capsule Take 1 capsule by mouth 2 (two) times daily as needed.      tiZANidine 4 mg Cap TAKE 1 TABLET BY MOUTH TWICE DAILY AS NEEDED FOR SPASM/PAIN       No current facility-administered medications on file prior to visit.          Objective:      BP (!) 150/89   Pulse 79   Ht 5' 4" (1.626 m)   Wt 93 kg (205 lb 0.4 oz)   BMI 35.19 " "kg/m²   Physical Exam  General the patient is alert and oriented x3 no acute distress nontoxic-appearing appropriate affect.    Constitutional: Vital signs are examined and stable.  Resp: No signs of labored breathing    Musculoskeletal:     Right lower extremity: no signs erythema, drainage, signs of dehiscence, stable eschar; compartments are soft and compressible; Tolerates full passive ROM of the right ankle; mild to moderate  tenderness to palpation laterally; dorsi/plantar flexes the foot; SILT distally; BCR distally; DP pulse palpable  Left lower extremity:  Point tenderness palpation of the plantar aspect of the foot of the origin of the plantar fascia no signs of erythema, drainage, signs of dehiscence; compartments are soft and compressible; no ankle ROM attempted due to fusion; no tenderness palpation of the base of the 5th metatarsal SILT distally; BCR distally; DP pulse palpable      Body mass index is 35.19 kg/m².  Ideal body weight: 54.7 kg (120 lb 9.5 oz)  Adjusted ideal body weight: 70 kg (154 lb 5.9 oz)  No results found for: "HGBA1C"  Hgb   Date Value Ref Range Status   09/28/2023 9.6 (L) 12.0 - 16.0 g/dL Final   09/27/2023 10.5 (L) 12.0 - 16.0 g/dL Final     Hct   Date Value Ref Range Status   09/28/2023 29.7 (L) 37.0 - 47.0 % Final   09/27/2023 32.4 (L) 37.0 - 47.0 % Final     No results found for: "IRON"  No components found for: "FROLATE"  Vitamin D   Date Value Ref Range Status   11/29/2023 29.3 (L) 30.0 - 80.0 ng/mL Final   06/27/2017 23.31 (L) 30.00 - 80.00 ng/mL Final     WBC   Date Value Ref Range Status   09/28/2023 8.81 4.50 - 11.50 x10(3)/mcL Final   09/27/2023 9.35 4.50 - 11.50 x10(3)/mcL Final       Radiology:  CT scan of the right ankle showing cystic changes to the distal tibia plafond.  She has significant posttraumatic arthritis.  She has cystic changes of the distal fibula around a inter frag screw.  This hardware has been stable.  Patient has overall destruction of the " tibiotalar joint without signs of displacement      Assessment:         1. Closed displaced pilon fracture of left tibia with routine healing, subsequent encounter        2. Closed displaced pilon fracture of right tibia with routine healing, subsequent encounter                  Plan:         No follow-ups on file.    There are no diagnoses linked to this encounter.    Patient is a 48-year-old well known to our practice.  She has a left ankle hindfoot fusion which has gone on to full fusion she does have continued numbness and tingling to the bottom of her foot comes and goes.  Her right ankle appears to be aggressively increasing with posttraumatic arthritis.  She has has pain.  She does have a screw entering the posterior aspect of the fibula there is cystic changes around there.  Most of her pain is in the front worse with weight-bearing.  She is having aggressive arthrosis of the joint.  We have discussed multiple visits including this visit about amputation tibiotalar fusion.  We have gone to the details of the surgery the complications including infection and soft tissue complications.  We have gotten a CT scan that shows the arthrosis the cystic changes of the distal tibia concerning for posttraumatic arthritis.  She was in a tough spot with both ankles being involved with the traumatic accident in bilateral fusions.  We have discussed amputation in depth he was being a functional improvement due to her bilateral fusions that are needed.  She says she will go home and think about it she is not interested in surgery at this time she has politely declined further surgery.  She states she will follow up once she was ready to fuse her right tibiotalar joint with a complete removal of hardware.    This note/OR report was created with the assistance of  voice recognition software or phone  dictation.  There may be transcription errors as a result of using this technology however minimal. Effort has been made to  assure accuracy of transcription but any obvious errors or omissions should be clarified with the author of the document.       Ryan Humphrey, DO  Orthopedic Trauma Surgery         No future appointments.

## 2025-07-02 DIAGNOSIS — R13.19 CONSTANT LOW-GRADE DYSPHAGIA: Primary | ICD-10-CM

## 2025-07-02 DIAGNOSIS — Z12.31 OTHER SCREENING MAMMOGRAM: Primary | ICD-10-CM

## 2025-07-08 ENCOUNTER — HOSPITAL ENCOUNTER (OUTPATIENT)
Dept: RADIOLOGY | Facility: HOSPITAL | Age: 50
Discharge: HOME | End: 2025-07-08
Attending: STUDENT IN AN ORGANIZED HEALTH CARE EDUCATION/TRAINING PROGRAM
Payer: MEDICAID

## 2025-07-08 DIAGNOSIS — Z12.31 OTHER SCREENING MAMMOGRAM: ICD-10-CM

## 2025-07-08 PROCEDURE — 77063 BREAST TOMOSYNTHESIS BI: CPT | Mod: TC

## 2025-07-10 ENCOUNTER — HOSPITAL ENCOUNTER (OUTPATIENT)
Dept: RADIOLOGY | Facility: HOSPITAL | Age: 50
Discharge: HOME | End: 2025-07-10
Attending: STUDENT IN AN ORGANIZED HEALTH CARE EDUCATION/TRAINING PROGRAM
Payer: MEDICAID

## 2025-07-10 ENCOUNTER — CLINICAL SUPPORT (OUTPATIENT)
Dept: REHABILITATION | Facility: HOSPITAL | Age: 50
End: 2025-07-10
Attending: STUDENT IN AN ORGANIZED HEALTH CARE EDUCATION/TRAINING PROGRAM
Payer: MEDICAID

## 2025-07-10 DIAGNOSIS — R13.19 CONSTANT LOW-GRADE DYSPHAGIA: ICD-10-CM

## 2025-07-10 PROCEDURE — 25500020 PHARM REV CODE 255: Performed by: STUDENT IN AN ORGANIZED HEALTH CARE EDUCATION/TRAINING PROGRAM

## 2025-07-10 PROCEDURE — A9698 NON-RAD CONTRAST MATERIALNOC: HCPCS | Performed by: STUDENT IN AN ORGANIZED HEALTH CARE EDUCATION/TRAINING PROGRAM

## 2025-07-10 PROCEDURE — 92611 MOTION FLUOROSCOPY/SWALLOW: CPT

## 2025-07-10 PROCEDURE — 74230 X-RAY XM SWLNG FUNCJ C+: CPT | Mod: TC

## 2025-07-10 RX ADMIN — BARIUM SULFATE 10 ML: 0.81 POWDER, FOR SUSPENSION ORAL at 01:07

## (undated) DEVICE — SUT 2-0 ETHILON 18 FS

## (undated) DEVICE — BANDAGE MATRIX HK LOOP 4IN 5YD

## (undated) DEVICE — BANDAGE ACE ELASTIC 6"

## (undated) DEVICE — COVER TABLE HVY DTY 60X90IN

## (undated) DEVICE — BIT DRILL 4.2 SHORT

## (undated) DEVICE — GUIDE WIRE, BALL-TIPPED, STERILE
Type: IMPLANTABLE DEVICE | Site: ANKLE | Status: NON-FUNCTIONAL
Removed: 2023-09-25

## (undated) DEVICE — SUT VICRYL BR 1 GEN 27 CT-1

## (undated) DEVICE — Device

## (undated) DEVICE — DRAPE C-ARMOR EQUIPMENT COVER

## (undated) DEVICE — BANDAGE COMPR 6IN HOOK 6INX5YD

## (undated) DEVICE — PAD CAST 6X4YD SPECIALISTIC

## (undated) DEVICE — GLOVE PROTEXIS LTX MICRO 8

## (undated) DEVICE — SUT ETHILON 3-0 FS-1 30

## (undated) DEVICE — GLOVE PROTEXIS PI CRM 7

## (undated) DEVICE — GLOVE PROTEXIS NEU-THERA SZ6

## (undated) DEVICE — TAPE SILK 3IN

## (undated) DEVICE — APPLICATOR CHLORAPREP ORN 26ML

## (undated) DEVICE — GLOVE PROTEXIS BLUE LATEX 7

## (undated) DEVICE — BANDAGE GAUZE COT STRL 4.5X4.1

## (undated) DEVICE — SPONGE COTTON TRAY 4X4IN

## (undated) DEVICE — BIT DRILL CANN COMPR 2.7X145MM

## (undated) DEVICE — GUIDEWIRE TROCAR TIP 0.8X100MM
Type: IMPLANTABLE DEVICE | Site: ANKLE | Status: NON-FUNCTIONAL
Removed: 2023-09-17

## (undated) DEVICE — DRAPE FULL SHEET 70X100IN

## (undated) DEVICE — DRAPE STERI U-SHAPED 47X51IN

## (undated) DEVICE — BANDAGE VELCLOSE ELAS 4INX5YD

## (undated) DEVICE — SUT ETHILON 2-0 FSLX 30 BLK

## (undated) DEVICE — BANDAGE ESMARK 6INX3YD

## (undated) DEVICE — BANDAGE ACE NON LATEX 3IN

## (undated) DEVICE — DRAPE ORTH SPLIT 77X108IN

## (undated) DEVICE — SPONGE GAUZE 16PLY 4X4

## (undated) DEVICE — DRESSING XEROFORM 5X9IN

## (undated) DEVICE — GLOVE PROTEXIS HYDROGEL SZ9

## (undated) DEVICE — GUIDEWIRE TROCAR TIP 1.4X150MM
Type: IMPLANTABLE DEVICE | Site: ANKLE | Status: NON-FUNCTIONAL
Removed: 2023-09-17

## (undated) DEVICE — STAPLER SKIN PROXIMATE WIDE

## (undated) DEVICE — GAUZE DERMACEA LOW PLY 2X4YRDS

## (undated) DEVICE — BIT DRILL 2.0MM D DEPTH 110MM

## (undated) DEVICE — BANDAGE KERLIX AMD

## (undated) DEVICE — GUIDEWIRE UT 1.4X150MM
Type: IMPLANTABLE DEVICE | Site: ANKLE | Status: NON-FUNCTIONAL
Removed: 2023-09-25

## (undated) DEVICE — SUT PROLENE 4-0 FS-2 BL MON

## (undated) DEVICE — DRESSING XEROFORM FOIL PK 1X8

## (undated) DEVICE — DEVICE PLASMABLADE X 3.0S LT

## (undated) DEVICE — BANDAGE FLEX-MASTER CLP 6X11YD

## (undated) DEVICE — SUT MCRYL PLUS 2-0 CT-1 36IN

## (undated) DEVICE — FREEHAND DRILL 4.2X130MM

## (undated) DEVICE — REAMER MOD BIXCUT 8X48MM STER.

## (undated) DEVICE — K-WIRE
Type: IMPLANTABLE DEVICE | Site: ANKLE | Status: NON-FUNCTIONAL
Removed: 2023-09-25

## (undated) DEVICE — GOWN SMARTSLEEVE AAMI LVL4 XL

## (undated) DEVICE — CONTAINER SPECIMEN SCREW 4OZ

## (undated) DEVICE — DRESSING GAUZE XEROFORM 5X9

## (undated) DEVICE — SPONGE KERLIX NS 12-PLY 4X4IN

## (undated) DEVICE — GOWN SMARTGOWN 3XL XLONG

## (undated) DEVICE — GUIDE WIRE, SMOOTH-TIPPED, STERILE
Type: IMPLANTABLE DEVICE | Site: ANKLE | Status: NON-FUNCTIONAL
Removed: 2023-09-25

## (undated) DEVICE — DRESSING XEROFORM 1X8IN

## (undated) DEVICE — ELECTRODE REM POLYHESIVE II

## (undated) DEVICE — GAUZE AVANT SPNG 4PLY STRL 4X4

## (undated) DEVICE — SPONGE GAUZE 4X4 12 PLY STRL

## (undated) DEVICE — GLOVE 8 PROTEXIS PI ORTHO

## (undated) DEVICE — GLOVE PROTEXIS BLUE LATEX 9

## (undated) DEVICE — BOOT WALKER ROCKER MEDIUM

## (undated) DEVICE — GLOVE PROTEXIS HYDROGEL SZ6

## (undated) DEVICE — PADDING 4X4YD SPECIALIST100

## (undated) DEVICE — COVER FULLGUARD SHOE HIGH-TOP

## (undated) DEVICE — BANDAGE VELCLOSE ELAS 6INX5YD

## (undated) DEVICE — TRAY BETADINE PREP NLA

## (undated) DEVICE — SUT BONE WAX 2.5 GRMS 12/BX